# Patient Record
Sex: MALE | Race: WHITE | NOT HISPANIC OR LATINO | URBAN - METROPOLITAN AREA
[De-identification: names, ages, dates, MRNs, and addresses within clinical notes are randomized per-mention and may not be internally consistent; named-entity substitution may affect disease eponyms.]

---

## 2017-05-24 ENCOUNTER — EMERGENCY (EMERGENCY)
Facility: HOSPITAL | Age: 77
LOS: 1 days | Discharge: HOME | End: 2017-05-24
Attending: EMERGENCY MEDICINE | Admitting: EMERGENCY MEDICINE
Payer: MEDICARE

## 2017-05-24 VITALS
TEMPERATURE: 98 F | HEIGHT: 70 IN | SYSTOLIC BLOOD PRESSURE: 176 MMHG | HEART RATE: 90 BPM | RESPIRATION RATE: 16 BRPM | OXYGEN SATURATION: 97 % | WEIGHT: 149.91 LBS | DIASTOLIC BLOOD PRESSURE: 80 MMHG

## 2017-05-24 VITALS
HEART RATE: 86 BPM | TEMPERATURE: 98 F | RESPIRATION RATE: 15 BRPM | DIASTOLIC BLOOD PRESSURE: 88 MMHG | OXYGEN SATURATION: 98 % | SYSTOLIC BLOOD PRESSURE: 155 MMHG

## 2017-05-24 DIAGNOSIS — Z86.73 PERSONAL HISTORY OF TRANSIENT ISCHEMIC ATTACK (TIA), AND CEREBRAL INFARCTION WITHOUT RESIDUAL DEFICITS: ICD-10-CM

## 2017-05-24 DIAGNOSIS — E11.9 TYPE 2 DIABETES MELLITUS WITHOUT COMPLICATIONS: ICD-10-CM

## 2017-05-24 DIAGNOSIS — Z79.84 LONG TERM (CURRENT) USE OF ORAL HYPOGLYCEMIC DRUGS: ICD-10-CM

## 2017-05-24 DIAGNOSIS — R10.9 UNSPECIFIED ABDOMINAL PAIN: ICD-10-CM

## 2017-05-24 DIAGNOSIS — N20.0 CALCULUS OF KIDNEY: ICD-10-CM

## 2017-05-24 LAB
ALBUMIN SERPL ELPH-MCNC: 4.4 G/DL — SIGNIFICANT CHANGE UP (ref 3.3–5)
ALP SERPL-CCNC: 66 U/L — SIGNIFICANT CHANGE UP (ref 40–120)
ALT FLD-CCNC: 24 U/L — SIGNIFICANT CHANGE UP (ref 12–78)
ANION GAP SERPL CALC-SCNC: 9 MMOL/L — SIGNIFICANT CHANGE UP (ref 5–17)
APPEARANCE UR: CLEAR — SIGNIFICANT CHANGE UP
AST SERPL-CCNC: 24 U/L — SIGNIFICANT CHANGE UP (ref 15–37)
BASOPHILS # BLD AUTO: 0 K/UL — SIGNIFICANT CHANGE UP (ref 0–0.2)
BASOPHILS NFR BLD AUTO: 0.5 % — SIGNIFICANT CHANGE UP (ref 0–2)
BILIRUB SERPL-MCNC: 0.8 MG/DL — SIGNIFICANT CHANGE UP (ref 0.2–1.2)
BILIRUB UR-MCNC: NEGATIVE — SIGNIFICANT CHANGE UP
BUN SERPL-MCNC: 22 MG/DL — SIGNIFICANT CHANGE UP (ref 7–23)
CALCIUM SERPL-MCNC: 9.5 MG/DL — SIGNIFICANT CHANGE UP (ref 8.5–10.1)
CHLORIDE SERPL-SCNC: 103 MMOL/L — SIGNIFICANT CHANGE UP (ref 96–108)
CO2 SERPL-SCNC: 28 MMOL/L — SIGNIFICANT CHANGE UP (ref 22–31)
COLOR SPEC: YELLOW — SIGNIFICANT CHANGE UP
CREAT SERPL-MCNC: 1.6 MG/DL — HIGH (ref 0.5–1.3)
DIFF PNL FLD: ABNORMAL
EOSINOPHIL # BLD AUTO: 0 K/UL — SIGNIFICANT CHANGE UP (ref 0–0.5)
EOSINOPHIL NFR BLD AUTO: 0.1 % — SIGNIFICANT CHANGE UP (ref 0–6)
GLUCOSE SERPL-MCNC: 285 MG/DL — HIGH (ref 70–99)
GLUCOSE UR QL: 300 MG/DL
HCT VFR BLD CALC: 45.1 % — SIGNIFICANT CHANGE UP (ref 39–50)
HGB BLD-MCNC: 15.7 G/DL — SIGNIFICANT CHANGE UP (ref 13–17)
KETONES UR-MCNC: ABNORMAL
LEUKOCYTE ESTERASE UR-ACNC: NEGATIVE — SIGNIFICANT CHANGE UP
LYMPHOCYTES # BLD AUTO: 0.4 K/UL — LOW (ref 1–3.3)
LYMPHOCYTES # BLD AUTO: 5.4 % — LOW (ref 13–44)
MCHC RBC-ENTMCNC: 33.4 PG — SIGNIFICANT CHANGE UP (ref 27–34)
MCHC RBC-ENTMCNC: 34.9 GM/DL — SIGNIFICANT CHANGE UP (ref 32–36)
MCV RBC AUTO: 95.6 FL — SIGNIFICANT CHANGE UP (ref 80–100)
MONOCYTES # BLD AUTO: 0.5 K/UL — SIGNIFICANT CHANGE UP (ref 0–0.9)
MONOCYTES NFR BLD AUTO: 6.5 % — SIGNIFICANT CHANGE UP (ref 1–9)
NEUTROPHILS # BLD AUTO: 7.3 K/UL — SIGNIFICANT CHANGE UP (ref 1.8–7.4)
NEUTROPHILS NFR BLD AUTO: 87.5 % — HIGH (ref 43–77)
NITRITE UR-MCNC: NEGATIVE — SIGNIFICANT CHANGE UP
PH UR: 6 — SIGNIFICANT CHANGE UP (ref 5–8)
PLATELET # BLD AUTO: 210 K/UL — SIGNIFICANT CHANGE UP (ref 150–400)
POTASSIUM SERPL-MCNC: 5.5 MMOL/L — HIGH (ref 3.5–5.3)
POTASSIUM SERPL-SCNC: 5.5 MMOL/L — HIGH (ref 3.5–5.3)
PROT SERPL-MCNC: 7.8 G/DL — SIGNIFICANT CHANGE UP (ref 6–8.3)
PROT UR-MCNC: 75 MG/DL
RBC # BLD: 4.71 M/UL — SIGNIFICANT CHANGE UP (ref 4.2–5.8)
RBC # FLD: 11.5 % — SIGNIFICANT CHANGE UP (ref 10.3–14.5)
SODIUM SERPL-SCNC: 140 MMOL/L — SIGNIFICANT CHANGE UP (ref 135–145)
SP GR SPEC: 1.01 — SIGNIFICANT CHANGE UP (ref 1.01–1.02)
UROBILINOGEN FLD QL: NEGATIVE — SIGNIFICANT CHANGE UP
WBC # BLD: 8.3 K/UL — SIGNIFICANT CHANGE UP (ref 3.8–10.5)
WBC # FLD AUTO: 8.3 K/UL — SIGNIFICANT CHANGE UP (ref 3.8–10.5)

## 2017-05-24 PROCEDURE — 81001 URINALYSIS AUTO W/SCOPE: CPT

## 2017-05-24 PROCEDURE — 85027 COMPLETE CBC AUTOMATED: CPT

## 2017-05-24 PROCEDURE — 87086 URINE CULTURE/COLONY COUNT: CPT

## 2017-05-24 PROCEDURE — 74176 CT ABD & PELVIS W/O CONTRAST: CPT

## 2017-05-24 PROCEDURE — 99284 EMERGENCY DEPT VISIT MOD MDM: CPT | Mod: 25

## 2017-05-24 PROCEDURE — 99284 EMERGENCY DEPT VISIT MOD MDM: CPT

## 2017-05-24 PROCEDURE — 80053 COMPREHEN METABOLIC PANEL: CPT

## 2017-05-24 PROCEDURE — 74176 CT ABD & PELVIS W/O CONTRAST: CPT | Mod: 26

## 2017-05-24 RX ORDER — CEFOXITIN 1 G/1
1 INJECTION, POWDER, FOR SOLUTION INTRAVENOUS
Qty: 20 | Refills: 0 | OUTPATIENT
Start: 2017-05-24 | End: 2017-06-03

## 2017-05-24 RX ORDER — TAMSULOSIN HYDROCHLORIDE 0.4 MG/1
0.4 CAPSULE ORAL ONCE
Qty: 0 | Refills: 0 | Status: DISCONTINUED | OUTPATIENT
Start: 2017-05-24 | End: 2017-05-28

## 2017-05-24 RX ORDER — SODIUM CHLORIDE 9 MG/ML
1000 INJECTION INTRAMUSCULAR; INTRAVENOUS; SUBCUTANEOUS ONCE
Qty: 0 | Refills: 0 | Status: COMPLETED | OUTPATIENT
Start: 2017-05-24 | End: 2017-05-24

## 2017-05-24 RX ORDER — CEFUROXIME AXETIL 250 MG
500 TABLET ORAL ONCE
Qty: 0 | Refills: 0 | Status: DISCONTINUED | OUTPATIENT
Start: 2017-05-24 | End: 2017-05-28

## 2017-05-24 RX ORDER — TAMSULOSIN HYDROCHLORIDE 0.4 MG/1
1 CAPSULE ORAL
Qty: 7 | Refills: 0 | OUTPATIENT
Start: 2017-05-24 | End: 2017-05-31

## 2017-05-24 RX ADMIN — SODIUM CHLORIDE 1000 MILLILITER(S): 9 INJECTION INTRAMUSCULAR; INTRAVENOUS; SUBCUTANEOUS at 13:13

## 2017-05-24 NOTE — ED PROVIDER NOTE - MEDICAL DECISION MAKING DETAILS
left flank pain, ct , labs, ivf, found to have 3 mm stone, po abx, fllomax follow up with Dr Irving on Friday

## 2017-05-24 NOTE — ED ADULT NURSE NOTE - OBJECTIVE STATEMENT
LEft flank pain history of kidney stones states it "feels the same" afebrile will continue to monitor

## 2017-05-24 NOTE — ED PROVIDER NOTE - OBJECTIVE STATEMENT
left flank pain, began last night, states has hx of kidney stones, urologist Dr Irving  denies fever

## 2017-05-24 NOTE — ED PROVIDER NOTE - PROGRESS NOTE DETAILS
spoke with Dr Irving, case discussed, results discussed, advised ceftin, flomax , pain medication, increase fluids, follow up in office on Friday.  results discussed with patient 3 mm stone, rx for ceftin, flomax sent to pharmacy, states he has pain medication at home,

## 2017-05-24 NOTE — ED PROVIDER NOTE - ATTENDING CONTRIBUTION TO CARE
Left flank pain here for evaluation. Exam revealed older white male in mild distress with moderate left CVAT. I agree with plan and management outlined by PA

## 2017-05-25 LAB
CULTURE RESULTS: NO GROWTH — SIGNIFICANT CHANGE UP
SPECIMEN SOURCE: SIGNIFICANT CHANGE UP

## 2018-12-24 ENCOUNTER — EMERGENCY (EMERGENCY)
Facility: HOSPITAL | Age: 78
LOS: 1 days | Discharge: HOME | End: 2018-12-24
Attending: EMERGENCY MEDICINE
Payer: MEDICARE

## 2018-12-24 ENCOUNTER — EMERGENCY (EMERGENCY)
Facility: HOSPITAL | Age: 78
LOS: 1 days | Discharge: HOME | End: 2018-12-24
Attending: EMERGENCY MEDICINE | Admitting: EMERGENCY MEDICINE
Payer: MEDICARE

## 2018-12-24 VITALS — SYSTOLIC BLOOD PRESSURE: 190 MMHG | DIASTOLIC BLOOD PRESSURE: 91 MMHG | HEART RATE: 75 BPM

## 2018-12-24 VITALS
OXYGEN SATURATION: 99 % | HEART RATE: 85 BPM | RESPIRATION RATE: 16 BRPM | SYSTOLIC BLOOD PRESSURE: 140 MMHG | DIASTOLIC BLOOD PRESSURE: 90 MMHG | TEMPERATURE: 98 F

## 2018-12-24 VITALS
OXYGEN SATURATION: 100 % | WEIGHT: 153 LBS | DIASTOLIC BLOOD PRESSURE: 100 MMHG | HEART RATE: 77 BPM | TEMPERATURE: 98 F | HEIGHT: 70 IN | RESPIRATION RATE: 14 BRPM | SYSTOLIC BLOOD PRESSURE: 190 MMHG

## 2018-12-24 VITALS
RESPIRATION RATE: 20 BRPM | SYSTOLIC BLOOD PRESSURE: 140 MMHG | OXYGEN SATURATION: 100 % | HEART RATE: 74 BPM | TEMPERATURE: 98 F | DIASTOLIC BLOOD PRESSURE: 90 MMHG

## 2018-12-24 PROBLEM — I63.9 CEREBRAL INFARCTION, UNSPECIFIED: Chronic | Status: ACTIVE | Noted: 2017-05-24

## 2018-12-24 LAB
ALBUMIN SERPL ELPH-MCNC: 4.4 G/DL — SIGNIFICANT CHANGE UP (ref 3.3–5)
ALP SERPL-CCNC: 70 U/L — SIGNIFICANT CHANGE UP (ref 40–120)
ALT FLD-CCNC: 34 U/L — SIGNIFICANT CHANGE UP (ref 12–78)
ANION GAP SERPL CALC-SCNC: 6 MMOL/L — SIGNIFICANT CHANGE UP (ref 5–17)
APTT BLD: 30.7 SEC — SIGNIFICANT CHANGE UP (ref 28.5–37)
AST SERPL-CCNC: 30 U/L — SIGNIFICANT CHANGE UP (ref 15–37)
BASOPHILS # BLD AUTO: 0.01 K/UL — SIGNIFICANT CHANGE UP (ref 0–0.2)
BASOPHILS NFR BLD AUTO: 0.2 % — SIGNIFICANT CHANGE UP (ref 0–2)
BILIRUB SERPL-MCNC: 0.5 MG/DL — SIGNIFICANT CHANGE UP (ref 0.2–1.2)
BUN SERPL-MCNC: 24 MG/DL — HIGH (ref 7–23)
CALCIUM SERPL-MCNC: 9.6 MG/DL — SIGNIFICANT CHANGE UP (ref 8.5–10.1)
CHLORIDE SERPL-SCNC: 105 MMOL/L — SIGNIFICANT CHANGE UP (ref 96–108)
CO2 SERPL-SCNC: 29 MMOL/L — SIGNIFICANT CHANGE UP (ref 22–31)
CREAT SERPL-MCNC: 1.2 MG/DL — SIGNIFICANT CHANGE UP (ref 0.5–1.3)
EOSINOPHIL # BLD AUTO: 0.12 K/UL — SIGNIFICANT CHANGE UP (ref 0–0.5)
EOSINOPHIL NFR BLD AUTO: 2.5 % — SIGNIFICANT CHANGE UP (ref 0–6)
GLUCOSE SERPL-MCNC: 198 MG/DL — HIGH (ref 70–99)
HCT VFR BLD CALC: 38.8 % — LOW (ref 39–50)
HGB BLD-MCNC: 13.7 G/DL — SIGNIFICANT CHANGE UP (ref 13–17)
IMM GRANULOCYTES NFR BLD AUTO: 0.2 % — SIGNIFICANT CHANGE UP (ref 0–1.5)
INR BLD: 1.07 RATIO — SIGNIFICANT CHANGE UP (ref 0.88–1.16)
LYMPHOCYTES # BLD AUTO: 1.05 K/UL — SIGNIFICANT CHANGE UP (ref 1–3.3)
LYMPHOCYTES # BLD AUTO: 21.5 % — SIGNIFICANT CHANGE UP (ref 13–44)
MCHC RBC-ENTMCNC: 32.1 PG — SIGNIFICANT CHANGE UP (ref 27–34)
MCHC RBC-ENTMCNC: 35.3 GM/DL — SIGNIFICANT CHANGE UP (ref 32–36)
MCV RBC AUTO: 90.9 FL — SIGNIFICANT CHANGE UP (ref 80–100)
MONOCYTES # BLD AUTO: 0.39 K/UL — SIGNIFICANT CHANGE UP (ref 0–0.9)
MONOCYTES NFR BLD AUTO: 8 % — SIGNIFICANT CHANGE UP (ref 2–14)
NEUTROPHILS # BLD AUTO: 3.31 K/UL — SIGNIFICANT CHANGE UP (ref 1.8–7.4)
NEUTROPHILS NFR BLD AUTO: 67.6 % — SIGNIFICANT CHANGE UP (ref 43–77)
NRBC # BLD: 0 /100 WBCS — SIGNIFICANT CHANGE UP (ref 0–0)
PLATELET # BLD AUTO: 230 K/UL — SIGNIFICANT CHANGE UP (ref 150–400)
POTASSIUM SERPL-MCNC: 5.5 MMOL/L — HIGH (ref 3.5–5.3)
POTASSIUM SERPL-SCNC: 5.5 MMOL/L — HIGH (ref 3.5–5.3)
PROT SERPL-MCNC: 7.8 G/DL — SIGNIFICANT CHANGE UP (ref 6–8.3)
PROTHROM AB SERPL-ACNC: 12.2 SEC — SIGNIFICANT CHANGE UP (ref 10–12.9)
RBC # BLD: 4.27 M/UL — SIGNIFICANT CHANGE UP (ref 4.2–5.8)
RBC # FLD: 12.6 % — SIGNIFICANT CHANGE UP (ref 10.3–14.5)
SODIUM SERPL-SCNC: 140 MMOL/L — SIGNIFICANT CHANGE UP (ref 135–145)
WBC # BLD: 4.89 K/UL — SIGNIFICANT CHANGE UP (ref 3.8–10.5)
WBC # FLD AUTO: 4.89 K/UL — SIGNIFICANT CHANGE UP (ref 3.8–10.5)

## 2018-12-24 PROCEDURE — 99285 EMERGENCY DEPT VISIT HI MDM: CPT | Mod: 25

## 2018-12-24 PROCEDURE — 76512 OPH US DX B-SCAN: CPT

## 2018-12-24 PROCEDURE — 99291 CRITICAL CARE FIRST HOUR: CPT

## 2018-12-24 PROCEDURE — 99285 EMERGENCY DEPT VISIT HI MDM: CPT

## 2018-12-24 PROCEDURE — 36415 COLL VENOUS BLD VENIPUNCTURE: CPT

## 2018-12-24 PROCEDURE — 70450 CT HEAD/BRAIN W/O DYE: CPT | Mod: 26

## 2018-12-24 PROCEDURE — 70450 CT HEAD/BRAIN W/O DYE: CPT

## 2018-12-24 PROCEDURE — 80053 COMPREHEN METABOLIC PANEL: CPT

## 2018-12-24 PROCEDURE — 85730 THROMBOPLASTIN TIME PARTIAL: CPT

## 2018-12-24 PROCEDURE — 85027 COMPLETE CBC AUTOMATED: CPT

## 2018-12-24 PROCEDURE — 85610 PROTHROMBIN TIME: CPT

## 2018-12-24 RX ORDER — AMLODIPINE BESYLATE 2.5 MG/1
5 TABLET ORAL ONCE
Qty: 0 | Refills: 0 | Status: COMPLETED | OUTPATIENT
Start: 2018-12-24 | End: 2018-12-24

## 2018-12-24 RX ORDER — AMLODIPINE BESYLATE 2.5 MG/1
1 TABLET ORAL
Qty: 14 | Refills: 0
Start: 2018-12-24 | End: 2019-01-06

## 2018-12-24 RX ORDER — ASPIRIN/CALCIUM CARB/MAGNESIUM 324 MG
1 TABLET ORAL
Qty: 0 | Refills: 0 | COMMUNITY

## 2018-12-24 RX ADMIN — AMLODIPINE BESYLATE 5 MILLIGRAM(S): 2.5 TABLET ORAL at 21:39

## 2018-12-24 NOTE — ED PROVIDER NOTE - PROGRESS NOTE DETAILS
ophthalmology consult that had already been called is now educated that ultrasound demonstrates likely macula on retinal detachment with vitreous hemorrhage and that patient will likely need surgery. Lauro Balbuena MD, FACEP: ophthalmology here and evaluating patient. Informed low salt diet and start amlodipine for HTN, asymptomatic. He will f/u with his PMD in this week.

## 2018-12-24 NOTE — ED PROVIDER NOTE - NEUROLOGICAL, MLM
Alert and oriented, no focal deficits, no motor or sensory deficits EXCEPT FOR THE LOSS OF VISION IN R EYE NIHSS=1 DYSPHAGIA =PASS

## 2018-12-24 NOTE — ED PROVIDER NOTE - CARE PLAN
Principal Discharge DX:	Visual disturbance of one eye Principal Discharge DX:	Visual disturbance of one eye  Secondary Diagnosis:	Vitreous hemorrhage, right

## 2018-12-24 NOTE — ED PROVIDER NOTE - OBJECTIVE STATEMENT
pt is a 77 yo male who has hx of retinal surgery on r eye 4 years ago by dr gan pmd dr monik headley hx of stroke with left leg numbness now gone elev chol sp hernia and surgical pt is a 77 yo male who has hx of retinal surgery on r eye 4 years ago by dr gan pmd dr monik headley hx of stroke with left leg numbness now gone elev chol sp hernia and surgical repair of Dupuytren's contractures of both hands, a former smoker social drinker no drugs no allergies  he was in [facility] until he awoke at 7 am today with sudden loss of vision in r eye. he can perceive light but cant not see anything.  no other complaints no ha no nausea vomiting no weakness numbness

## 2018-12-24 NOTE — ED PROVIDER NOTE - NSFOLLOWUPCLINICS_GEN_ALL_ED_FT
[facility] Ophthalmology  Ophthalmology  [address] [place] [address] [address]  [place] [address]  Phone: [phone]  Fax:   Follow Up Time:

## 2018-12-24 NOTE — ED PROVIDER NOTE - EYES, MLM
Clear bilaterally, pupils equal, round and reactive to light. eomi, there is marked decreased vision of r eye on funduscopic eval the retina is unable to be seen no vasculature no red  reflex present, the left eye is sp cataracts retina able to be seen

## 2018-12-24 NOTE — ED PROVIDER NOTE - ATTENDING CONTRIBUTION TO CARE
Patient with right eye visual change upon awakening this am but has had increased pressure and some pain with only the ability to visualize light and colors today. Patient with right eye visual change upon awakening this am but has had increased pressure and some pain with only the ability to visualize light and colors today. Moderate. Worsening. Patient can only see light and color to right eye. patient denies any trauma.  GEN: NAD, awake, eyes open spontaneously  HEENT: NCAT, MMM, Trachea midline, normal conjunctiva, perrl  od: patient can not see fingers at 3-4 feet he can see color and light  os: 20/  ou: 20/  right eye fundoscopic exam darkened vitreous and unable to visualize vessels  left eye vessels visualized and grossly within normal limits  CHEST/LUNGS: Non-tachypneic, CTAB, bilateral breath sounds  CARDIAC: Non-tachycardic, normal perfusion  ABDOMEN: Soft, NTND, No rebound/guarding  MSK: No edema, no gross deformity of extremities  SKIN: No rashes, no petechiae, no vesicles  NEURO: CN grossly intact, normal coordination, no focal motor or sensory deficits  PSYCH: Alert, appropriate, cooperative, with capacity and insight   patient with elevated blood pressure and right visual eye loss to the point of seeing colors and lights  concern for right retinal detachment or hemorrhage  reviewed labs and imaging from outside hospital at [facility]  concerned for right eye retinal detachment with hemorrhage as see on ultrasound  will have ophthalmology consulted for emergent/urgent surgery Patient with right eye visual change upon awakening this am but has had increased pressure and some pain with only the ability to visualize light and colors today. Moderate. Worsening. Patient can only see light and color to right eye. patient denies any trauma.  GEN: NAD, awake, eyes open spontaneously  HEENT: NCAT, MMM, Trachea midline, normal conjunctiva, perrl  od: patient can not see fingers at 3-4 feet he can see color and light  os: 20/  ou: 20/  right eye fundoscopic exam darkened vitreous and unable to visualize vessels  left eye vessels visualized and grossly within normal limits  CHEST/LUNGS: Non-tachypneic, CTAB, bilateral breath sounds  CARDIAC: Non-tachycardic, normal perfusion  ABDOMEN: Soft, NTND, No rebound/guarding  MSK: No edema, no gross deformity of extremities  SKIN: No rashes, no petechiae, no vesicles  NEURO: CN grossly intact, normal coordination, no focal motor or sensory deficits  PSYCH: Alert, appropriate, cooperative, with capacity and insight   patient with elevated blood pressure and right visual eye loss to the point of seeing colors and lights  concern for right retinal detachment or hemorrhage  reviewed labs and imaging from outside hospital at [facility]  concerned for right eye retinal detachment with hemorrhage, ultrasound consistent with hemorrhage without direct evidence of retinal detachment  will have ophthalmology consulted for emergent/urgent surgery and evaluation   patient and family educated on findings, not likely detachment as per ophthalmology and upon review of imaging with patient/family/and resident  patient to  follow up in 2 days with ophthalmology for retinal reassessment, patient educated to keep the head of the bed elevated when sleeping and will  follow up this week  No immediate life threatening issues present on history or clinical exam. Patient is a safe disposition home, has capacity and insight into their condition, is ambulatory in the Emergency Department with no further questions and will follow up with their doctor(s) this week. Patient and family  understand anticipatory guidance were given strict return and follow up precautions.  The patient and family have been informed of all concerning signs and symptoms to return to Emergency Department, the necessity to follow up with the PMD/Clinic/follow up provided within 2-3 days was explained, and the patient and/or family reports understanding of above with capacity and insight. Patient with right eye visual change upon awakening this am but has had increased pressure and some pain with only the ability to visualize light and colors today. Moderate. Worsening. Patient can only see light and color to right eye. patient denies any trauma.  GEN: NAD, awake, eyes open spontaneously  HEENT: NCAT, MMM, Trachea midline, normal conjunctiva, perrl  od: patient can not see fingers at 3-4 feet he can see light  os: 20/25  right eye fundoscopic exam darkened vitreous and unable to visualize vessels  left eye vessels visualized and grossly within normal limits  CHEST/LUNGS: Non-tachypneic, CTAB, bilateral breath sounds  CARDIAC: Non-tachycardic, normal perfusion  ABDOMEN: Soft, NTND, No rebound/guarding  MSK: No edema, no gross deformity of extremities  SKIN: No rashes, no petechiae, no vesicles  NEURO: CN grossly intact, normal coordination, no focal motor or sensory deficits  PSYCH: Alert, appropriate, cooperative, with capacity and insight   patient with elevated blood pressure and right visual eye loss to the point of seeing colors and lights  concern for right retinal detachment or hemorrhage  reviewed labs and imaging from outside hospital at [facility]  concerned for right eye retinal detachment with hemorrhage, ultrasound consistent with hemorrhage without direct evidence of retinal detachment  will have ophthalmology consulted for emergent/urgent surgery and evaluation   patient and family educated on findings, not likely detachment as per ophthalmology and upon review of imaging with patient/family/and resident  patient to  follow up in 2 days with ophthalmology for retinal reassessment, patient educated to keep the head of the bed elevated when sleeping and will  follow up this week  No immediate life threatening issues present on history or clinical exam. Patient is a safe disposition home, has capacity and insight into their condition, is ambulatory in the Emergency Department with no further questions and will follow up with their doctor(s) this week. Patient and family  understand anticipatory guidance were given strict return and follow up precautions.  The patient and family have been informed of all concerning signs and symptoms to return to Emergency Department, the necessity to follow up with the PMD/Clinic/follow up provided within 2-3 days was explained, and the patient and/or family reports understanding of above with capacity and insight.

## 2018-12-24 NOTE — ED ADULT NURSE NOTE - OBJECTIVE STATEMENT
78 y.o male pmh DM, HLD, TIA in the passed, r. sided retinal surgery approx 4 years ago presenting to ED by EMS as transfer from [facility] c/o R. eye vision loss and sent to have optho consult. pt states he is able to visualize and make out shapes and lights but has not other vision side from that. denies any eye or head injury. states that the vision loss started at about 7 am this morning and has been gradually getting worse. denies any eye pain, headache, dizziness , nausea, vomiting, or unsteady gait. no significant neuro deficits present. bp elevated upon assessment with manual BP OF 190s-200s systolic. pt denies ever having a hx of HTN. remaining VS stable, HR of 78. family remains at the bedside safety and fall precautions maintained. pending optho consult. call bell at the bedside and within reach.

## 2018-12-24 NOTE — CONSULT NOTE ADULT - SUBJECTIVE AND OBJECTIVE BOX
[facility] Ophthalmology Consult Note    HPI: 78-year-old male      PMH: DM (a1c 6.3)  Meds: Metformin, glyburide, statin, aspirin  POcHx (including surgeries/lasers/trauma): [place] for diabetes [place] eye at [facility] ago, CEIOL OD  Drops: None  FamHx: None  Social Hx: Occasional alcohol, no tobacco  Allergies: NKDA    ROS:  General (neg), Vision (per HPI), Head and Neck (neg), Pulm (neg), CV (neg), GI (neg),  (neg), Musculoskeletal (neg), Skin/Integ (neg), Neuro (neg), Endocrine (neg), Heme (neg), All/Immuno (neg)    Mood and Affect Appropriate ( x ),  Oriented to Time, Place, and Person x 3 ( x )    Ophthalmology Exam    Visual acuity (cc reading glasses near card): HM OD, 20/25 OS  Pupils: PERRL OU, no APD  Ttono: 17 OU  Extraocular movements (EOMs): Full OU, no pain, no diplopia  Confrontational Visual Field (CVF):  Full to HM OD, full OS  Color Plates: WENDY OD 2/2 poor VA; 11/12 OS    Slit Lamp Exam (SLE)  External:  Flat OU  Lids/Lashes/Lacrimal Ducts: Flat OU    Sclera/Conjunctiva:  W+Q OU  Cornea: 1+ SPK OU  Anterior Chamber: D+Q OU   Iris:  Flat OU  Lens:  PCIOL OD; 1+ NS OS    Fundus Exam: dilated with 1% tropicamide and 2.5% phenylephrine  Approval obtained from primary team for dilation  Patient aware that pupils can remained dilated for at least 4-6 hours  Exam performed with 90D and 20D lens    Vitreous: wnl OU  Disc, cup/disc: sharp and pink, 0.4 OU  Macula:  wnl OU  Vessels:  wnl OU  Periphery: wnl OU    Diagnostic Testing:    Assessment:      Plan:      Follow-Up:  Patient should follow up his/her ophthalmologist or in the [facility] Ophthalmology Practice within 1 week of discharge  [address] [place] [address] [phone] [facility] Ophthalmology Consult Note    HPI: 78-year-old male DM s/p "laser" OD transferred from [place] with acute painless vision loss x 1 day. This morning patient noticed he could not see well out of his right eye described like a black areain the front of his vision that has gotten a little bit worse throughout the day, not intermittent. States happened acutely with no associated flashes of light. Has not happened before. No associated pain, headache, jaw claudication, or scalp tenderness, numbness/weakness. No trauma.    PMH: DM (a1c 6.3), TIA, HLD  Meds: Metformin, glyburide, statin, aspirin  POcHx (including surgeries/lasers/trauma): Laser for diabetes right eye at least 4 years ago, CEIOL OD  Drops: None  FamHx: None  Social Hx: Occasional alcohol, no tobacco  Allergies: NKDA    ROS:  General (neg), Vision (per HPI), Head and Neck (neg), Pulm (neg), CV (neg), GI (neg),  (neg), Musculoskeletal (neg), Skin/Integ (neg), Neuro (neg), Endocrine (neg), Heme (neg), All/Immuno (neg)    Mood and Affect Appropriate ( x ),  Oriented to Time, Place, and Person x 3 ( x )    Ophthalmology Exam    Visual acuity (cc reading glasses near card): HM OD, 20/25 OS  Pupils: PERRL OU, no APD  Ttono: 17 OU  Extraocular movements (EOMs): Full OU, no pain, no diplopia  Confrontational Visual Field (CVF):  Full to HM OD, full OS  Color Plates: WENDY OD 2/2 poor VA; 11/12 OS    Slit Lamp Exam (SLE)  External:  Flat OU  Lids/Lashes/Lacrimal Ducts: Flat OU    Sclera/Conjunctiva:  W+Q OU  Cornea: 1+ SPK OU  Anterior Chamber: D+Q OU   Iris:  Flat OU  Lens:  PCIOL OD; 1+ NS OS    Fundus Exam: dilated with 1% tropicamide and 2.5% phenylephrine  Approval obtained from primary team for dilation  Patient aware that pupils can remained dilated for at least 4-6 hours  Exam performed with 90D and 20D lens    Vitreous: wnl OU  Disc, cup/disc: sharp and pink, 0.4 OU  Macula:  wnl OU  Vessels:  wnl OU  Periphery: wnl OU    Diagnostic Testing:    Assessment:      Plan:      Follow-Up:  Patient should follow up his/her ophthalmologist or in the [facility] Ophthalmology Practice within 1 week of discharge  [address] [facility] [place] [address] [phone] [facility] Ophthalmology Consult Note    HPI: 78-year-old male DM s/p "laser" OD transferred from [place] with acute painless vision loss x 1 day. This morning patient noticed he could not see well out of his right eye described like a black area in the front of his vision that has gotten a little bit worse throughout the day, not intermittent. Can only see light/dark and motion. States happened acutely with no associated flashes of light or sudden onset floaters. Has not happened before. No associated pain, headache, jaw claudication, or scalp tenderness, numbness/weakness. No trauma.    PMH: DM (a1c 6.3), TIA, HLD  Meds: Metformin, glyburide, statin, aspirin  POcHx (including surgeries/lasers/trauma): Laser for diabetes right eye at least 4 years ago, CEIOL OD  Drops: None  FamHx: None  Social Hx: Occasional alcohol, no tobacco  Allergies: NKDA    ROS:  General (neg), Vision (per HPI), Head and Neck (neg), Pulm (neg), CV (neg), GI (neg),  (neg), Musculoskeletal (neg), Skin/Integ (neg), Neuro (neg), Endocrine (neg), Heme (neg), All/Immuno (neg)    Mood and Affect Appropriate ( x ),  Oriented to Time, Place, and Person x 3 ( x )    Ophthalmology Exam    Visual acuity (cc reading glasses near card): HM OD, 20/25 OS  Pupils: PERRL OU, no APD  Ttono: 17 OU  Extraocular movements (EOMs): Full OU, no pain, no diplopia  Confrontational Visual Field (CVF):  Full to HM OD, full OS  Color Plates: WENDY OD 2/2 poor VA; 11/12 OS    Slit Lamp Exam (SLE)  External:  Flat OU  Lids/Lashes/Lacrimal Ducts: Flat OU    Sclera/Conjunctiva:  W+Q OU  Cornea: trace SPK OU  Anterior Chamber: D+Q OU   Iris:  Flat OU  Lens:  PCIOL OD; 1+ NS OS    Fundus Exam: dilated with 1% tropicamide and 2.5% phenylephrine  Approval obtained from primary team for dilation  Patient aware that pupils can remained dilated for at least 4-6 hours  Exam performed with 90D and 20D lens    Vitreous: vitreous hemorrhage OD; clear OS  Disc, cup/disc: unable to visualize OD 2/2 vit heme; sharp and pink 0.4 OS  Macula:  unable to visualize OD 2/2 vit heme; scattered MA and IRH OS  Vessels:   unable to visualize OD 2/2 vit heme; normal OS  Periphery: unable to visualize OD 2/2 vit heme; scattered IRH OS    Diagnostic Testing: B-scan OD showed vitreous hemorrhage with no apparent detachment, tear, or break.    Assessment: 78-year-old male DM (A1c 6.3, s/p laser 4 years prior) transferred from [place] with acute painless vision loss x 1 day. No complaints OS. OD VA HM (PHNI), IOP 17, no APD. Anterior exam WNL. DFE OD with vitreous hemorrhage obscuring view to posterior pole. B-scan ultrasound OD showed vitreous hemorrhage without obvious retinal detachment. Images were reviewed with senior resident Dr. Gamez. Symptoms and exam consistent with vitreous hemorrhage 2/2 diabetic retinopathy especially with moderate diabetic retinopathy seen in the fellow eye.    Plan:  - no heavy lifting, no straining, no bending  - keep head of bed elevated  - avoid NSAIDs unless medically necessary  - follow-up with Dr. Storey at retina clinic [facility] [place] [address], [phone]). Message left for Dr. Storey.    Follow-Up:  Patient should follow up with his ohthalmologist or in the [facility] Ophthalmology Practice on Wednesday 12/26/2018  [address] [place] [address] [phone]    D/w chief resident Dr. Gamez.

## 2018-12-24 NOTE — ED PROVIDER NOTE - CRITICAL CARE PROVIDED
consultation with other physicians/consult w/ pt's family directly relating to pts condition/interpretation of diagnostic studies/documentation/additional history taking/direct patient care (not related to procedure)

## 2018-12-24 NOTE — ED PROVIDER NOTE - PHYSICAL EXAMINATION
NAD, VSS, Afebrile, + PERRL with full EOMs, Move all extremities with 5/5strength and intact sensory.

## 2018-12-24 NOTE — ED PROVIDER NOTE - PROGRESS NOTE DETAILS
arnold [phone] on call at [facility] syoss paged no name provided  to go to [facility] via transfer center. jay from transfer center- jay from transfer center-  dr jose villagomez attending

## 2018-12-24 NOTE — ED ADULT TRIAGE NOTE - CHIEF COMPLAINT QUOTE
"I can not see out of rt eye"  woke up unable to see out of rt eye  speech cl  moving upper and lower extremities  denies Headache/pain

## 2018-12-24 NOTE — ED ADULT NURSE NOTE - OBJECTIVE STATEMENT
Assumed patient care @ 1430. Pt received sitting on stretcher in no apparent distress. Pt AOx3 C/O not being able to see out of right eye since this am and sent by Urgent Care. Patient denies pain or headache. Patient with a h/o retina issues and laser of the vessels.  Lungs clear to ausculation, respirations even unlabored. Abd soft non tender, + bowel sounds x 4quadrants. Denies numbness, tingling, no facial droop, Nausea, Vomiting, Diarrhea. Skin warm, dry, color appropriate for age and race. Patient to be transferred to [facility] to rule out a retina detachment.

## 2018-12-26 ENCOUNTER — APPOINTMENT (OUTPATIENT)
Dept: OPHTHALMOLOGY | Facility: CLINIC | Age: 78
End: 2018-12-26
Payer: MEDICARE

## 2018-12-26 DIAGNOSIS — E11.3392 TYPE 2 DIABETES MELLITUS WITH MODERATE NONPROLIFERATIVE DIABETIC RETINOPATHY WITHOUT MACULAR EDEMA, LEFT EYE: ICD-10-CM

## 2018-12-26 DIAGNOSIS — E11.3591 TYPE 2 DIABETES MELLITUS WITH PROLIFERATIVE DIABETIC RETINOPATHY WITHOUT MACULAR EDEMA, RIGHT EYE: ICD-10-CM

## 2018-12-26 PROCEDURE — 92225: CPT | Mod: LT

## 2018-12-26 PROCEDURE — 92004 COMPRE OPH EXAM NEW PT 1/>: CPT

## 2018-12-26 PROCEDURE — 76512 OPH US DX B-SCAN: CPT | Mod: RT

## 2019-08-07 ENCOUNTER — EMERGENCY (EMERGENCY)
Facility: HOSPITAL | Age: 79
LOS: 1 days | Discharge: HOME | End: 2019-08-07
Attending: EMERGENCY MEDICINE | Admitting: EMERGENCY MEDICINE
Payer: MEDICARE

## 2019-08-07 VITALS
TEMPERATURE: 98 F | SYSTOLIC BLOOD PRESSURE: 144 MMHG | OXYGEN SATURATION: 99 % | WEIGHT: 154.98 LBS | DIASTOLIC BLOOD PRESSURE: 89 MMHG | HEART RATE: 83 BPM | RESPIRATION RATE: 16 BRPM

## 2019-08-07 PROCEDURE — 70450 CT HEAD/BRAIN W/O DYE: CPT | Mod: 26

## 2019-08-07 PROCEDURE — 99284 EMERGENCY DEPT VISIT MOD MDM: CPT

## 2019-08-07 NOTE — ED PROVIDER NOTE - OBJECTIVE STATEMENT
77 yo white male with H/O CVA (cerebral vascular accident)    Diabetes    Hyperlipidemia    Kidney stone, tripped and fell short while ago when emptying garbage, hit head, no LOC or neck pain now here for evaluation. No N/V/HA/Neck Pain/Hip Pains or Back Pains.

## 2019-08-07 NOTE — ED ADULT NURSE NOTE - NSIMPLEMENTINTERV_GEN_ALL_ED
Implemented All Fall with Harm Risk Interventions:  [place] to call system. Call bell, personal items and telephone within reach. Instruct patient to call for assistance. Room bathroom lighting operational. Non-slip footwear when patient is off stretcher. Physically safe environment: no spills, clutter or unnecessary equipment. Stretcher in lowest position, wheels locked, appropriate side rails in place. Provide visual cue, wrist band, yellow gown, etc. Monitor gait and stability. Monitor for mental status changes and reorient to person, place, and time. Review medications for side effects contributing to fall risk. Reinforce activity limits and safety measures with patient and family. Provide visual clues: red socks.

## 2019-08-07 NOTE — ED PROVIDER NOTE - ENMT, MLM
Airway patent. No C-Spine tenderness. Abrasion noted to right side of forehead and right cheek both non tender. Mandible non tender.

## 2019-08-07 NOTE — ED ADULT NURSE NOTE - OBJECTIVE STATEMENT
79 y/o M patient presents to ED from home s/p fall today. Patient reports he was taking the garbage out when he "lost balance" and fell onto concrete pavement face forward. Patient denies LOC. Patient A&Ox4. abrasion noted to right forehead and right cheek with bleeding. patient reports he was ambulatory s/p fall. Patient denies HA, dizziness, blurry vision, SOB, chest pain, abdominal pain, N/V/D. Safety and comfort measures provided and maintained. MD bedside.

## 2019-08-07 NOTE — ED PROVIDER NOTE - CHPI ED SYMPTOMS NEG
no change in level of consciousness/no confusion/no vomiting/no blurred vision/no seizure/no dizziness/no loss of consciousness/no syncope/no weakness/no nausea

## 2019-08-07 NOTE — ED ADULT NURSE NOTE - GENITOURINARY WDL
Bladder non-tender and non-distended. Urine clear yellow.
H/O Moh's micrographic surgery for skin cancer  4/30/18 right cheek  Inguinal hernia s/p repair (L)    Renal cyst surgery in 2008

## 2019-08-07 NOTE — ED PROVIDER NOTE - CLINICAL SUMMARY MEDICAL DECISION MAKING FREE TEXT BOX
"S:  Seen and examined.  POD #1 s/p R hip nail.  Doing well this morning.  No new complaints, pain controlled.    O: /44   Pulse 96   Temp 36.8 °C (98.2 °F) (Oral)   Resp 18   Ht 1.575 m (5' 2\")   Wt 99.8 kg (220 lb 0.3 oz)   SpO2 95% .    Intake/Output Summary (Last 24 hours) at 06/14/19 0904  Last data filed at 06/14/19 0800   Gross per 24 hour   Intake             3890 ml   Output             3900 ml   Net              -10 ml   .    Operative/injured extremity examined.  Compartments soft, distal light touch sensation intact, firing EHL/TA/GS/P.  Toes warm, well-perfused.  Wound dressing c/d/i    Recent Labs      06/12/19   1147  06/13/19   0503  06/13/19   1753   WBC  9.1  7.0  10.1   RBC  4.15*  3.84*  3.84*   HEMOGLOBIN  13.2  12.5  12.5   HEMATOCRIT  40.1  37.6  37.7   MCV  96.6  97.9*  98.2*   MCH  31.8  32.6  32.6   MCHC  32.9*  33.2*  33.2*   RDW  45.1  45.5  45.9   PLATELETCT  239  209  213   MPV  8.6*  8.8*  8.9*       A/P:    POD #1 s/p R hip nail    Antibiotics: Ancef x24 hrs  Activity: WBAT operative extremity.  PT today.  Diet: General  DVT: Mechanical (SCDs) + Pharmacologic (Coumadin)  Dispo: D/C planning    " Trip and fall short while ago, hit head now requiring evaluation and ct scan.

## 2019-08-08 VITALS
RESPIRATION RATE: 16 BRPM | DIASTOLIC BLOOD PRESSURE: 82 MMHG | OXYGEN SATURATION: 97 % | HEART RATE: 81 BPM | SYSTOLIC BLOOD PRESSURE: 137 MMHG | TEMPERATURE: 98 F

## 2019-08-08 PROCEDURE — 99284 EMERGENCY DEPT VISIT MOD MDM: CPT | Mod: 25

## 2019-08-08 PROCEDURE — 70450 CT HEAD/BRAIN W/O DYE: CPT

## 2019-08-08 RX ORDER — BACITRACIN ZINC 500 UNIT/G
1 OINTMENT IN PACKET (EA) TOPICAL ONCE
Refills: 0 | Status: COMPLETED | OUTPATIENT
Start: 2019-08-08 | End: 2019-08-08

## 2019-08-08 RX ADMIN — Medication 1 APPLICATION(S): at 00:26

## 2019-09-06 NOTE — ED PROVIDER NOTE - CARE PLAN
LARISSA Principal Discharge DX:	Head trauma, initial encounter  Secondary Diagnosis:	Facial abrasion, initial encounter

## 2019-10-24 NOTE — ED ADULT NURSE NOTE - CINV DISCH TEACH PARTICIP
Patient in with complaints of right foot pain since yesterday. Denies trauma. States went to clinic and was sent to ED for evaluation. Concerned infection because foot feels hot. States difficult to walk on foot because of the pain.  
Spouse/Patient

## 2020-03-04 NOTE — ED ADULT NURSE NOTE - BREATH SOUNDS, MLM
Detail Level: Simple
Additional Notes: Start TAC cream BID x 5-7 days\\n\\nStop application of Efudex on muscle area and continue to rest of face x 4 more days for a total of 14 days\\n\\nFor spot on nose use Efudex for another 2-3 extra days
Clear

## 2020-06-22 NOTE — ED PROVIDER NOTE - NSFOLLOWUPINSTRUCTIONS_ED_ALL_ED_FT
D/w pt and wife/kids. Will go to ER now for further evaluation. Head elevation.  No strain your eye.  Low salt diet.  Amlodipine 5mg daily for blood pressure and follow up with your primary dr. in this week.  Follow up with your eye dr. on Wednesday or eye clinic [phone].  Return for any concerns, headache, chest pain, SOB, or worsening symptoms.

## 2021-06-07 NOTE — ED ADULT NURSE NOTE - CHPI ED NUR SYMPTOMS NEG
L/M for patient informing him of medication change.   
Patient calling with questions/issues in regards to the following medication/s:   the new medication which patient was not sure of the name of it but that medication is going to be $47 for 30 pills and would like something cheaper if possible       If patient would require a prescription, please us the following Pharmacy:   [facility] [address] [place] [address] - [address] [facility]  [address] [facility] [address]  Phone: [phone] Fax: [phone]       Please call patient at the following number:  [phone]  Patient states that it is okay to leave a detailed message.    Patient was informed that the patient would receive a phone call back within 24-48 hours unless this request is STAT.  
Patient contacted us with concerns about the cost of the Jardiance, I instead prescribe glimepiride 1 mg q.a.m.  
Patient prescribed jardiance but states this is too expensive. Pharmacy loaded. PCP please advise alternative  
no vomiting/no confusion/no dizziness/no fever/no loss of consciousness/no weakness/no change in level of consciousness/no nausea/no numbness

## 2022-04-09 ENCOUNTER — TRANSCRIPTION ENCOUNTER (OUTPATIENT)
Age: 82
End: 2022-04-09

## 2022-11-29 NOTE — ED ADULT TRIAGE NOTE - AS O2 DELIVERY
room air Rhofade Pregnancy And Lactation Text: This medication has not been assigned a Pregnancy Risk Category. It is unknown if the medication is excreted in breast milk.

## 2022-12-19 NOTE — ED ADULT NURSE NOTE - CHPI ED NUR SYMPTOMS POS
NAME:   Jen Lopez  DATE: 22  MRN:     [id number]  :     2018      OPERATIVE NOTE      Pre Op Diagnosis:  Upper Airway Obstruction; Tonsil and Adenoid Hypertrophy; Chronic Otitis Media    Post Op Diagnosis:  same    Procedure:   Tonsillectomy and Adenoidectomy; Bilateral Myringotomy and tube placement    Surgeon:  Dereck    Anesthesia:  General    History:  This is a child with upper airway obstruction due to large tonsils and adenoids; and chronic otitis media with failure of medical management.    Procedure Description:    The patient was taken to the operating room and general anesthesia was induced without difficulty.      Attention was focused to the right ear using the microscope ear speculum and a curette the ear canal was cleaned of cerumen.  The tympanic membrane was visualized.  The myringotomy knife was used to make an anterior inferior quadrant incision.  Fluid was suctioned from the middle ear space and a tube was placed.  Attention was focused to the opposite ear and the same procedure and tube was placed.  Drops were applied to each ear.    Tubes placed: Ortiz    Findings:  Fluid bilateral ; Mucoid    A Mcgivor mouth gag was inserted.  The uvula and palate were inspected and found to be normal.  A red rubber catheter was inserted in the nose and out the oral cavity.  The adenoids were removed/ reduced with the coblation device.  Tissue was preserved at Passavant's ridge.  Hemostasis was achieved.  Attention was then focused to the hypertrophic tonsils.  Each tonsil was grasped with a tenaculum.  The coblation device was used to make a mucosal incision around the tonsil and the tonsil was dissected out and removed in it's entirety.  The opposite tonsil was removed in the same fashion.  Hemostasis was achieved with the coblation device without difficulty.  Stomach contents were suctioned.  The patient was awakened in the OR and taken to the recovery room in stable condition.  There were no  complications.  The EBL was less than 5 ml.    Adenoid Size:large    Tonsil Size: large    Additional Findings: right tonsil was granular and send fresh to pathology    Major Harden MD         change in vision right eye

## 2024-01-22 ENCOUNTER — INPATIENT (INPATIENT)
Facility: HOSPITAL | Age: 84
LOS: 3 days | Discharge: HOME | DRG: 683 | End: 2024-01-26
Attending: INTERNAL MEDICINE | Admitting: INTERNAL MEDICINE
Payer: MEDICARE

## 2024-01-22 VITALS
HEIGHT: 70 IN | TEMPERATURE: 98 F | SYSTOLIC BLOOD PRESSURE: 104 MMHG | RESPIRATION RATE: 18 BRPM | HEART RATE: 89 BPM | WEIGHT: 149.91 LBS | OXYGEN SATURATION: 99 % | DIASTOLIC BLOOD PRESSURE: 71 MMHG

## 2024-01-22 DIAGNOSIS — N17.9 ACUTE KIDNEY FAILURE, UNSPECIFIED: ICD-10-CM

## 2024-01-22 DIAGNOSIS — R62.7 ADULT FAILURE TO THRIVE: ICD-10-CM

## 2024-01-22 DIAGNOSIS — Z29.9 ENCOUNTER FOR PROPHYLACTIC MEASURES, UNSPECIFIED: ICD-10-CM

## 2024-01-22 DIAGNOSIS — E87.1 HYPO-OSMOLALITY AND HYPONATREMIA: ICD-10-CM

## 2024-01-22 DIAGNOSIS — E11.9 TYPE 2 DIABETES MELLITUS WITHOUT COMPLICATIONS: ICD-10-CM

## 2024-01-22 DIAGNOSIS — I10 ESSENTIAL (PRIMARY) HYPERTENSION: ICD-10-CM

## 2024-01-22 LAB
ALBUMIN SERPL ELPH-MCNC: 3.6 G/DL — SIGNIFICANT CHANGE UP (ref 3.3–5)
ALP SERPL-CCNC: 64 U/L — SIGNIFICANT CHANGE UP (ref 40–120)
ALT FLD-CCNC: 30 U/L — SIGNIFICANT CHANGE UP (ref 12–78)
ANION GAP SERPL CALC-SCNC: 9 MMOL/L — SIGNIFICANT CHANGE UP (ref 5–17)
AST SERPL-CCNC: 38 U/L — HIGH (ref 15–37)
BASOPHILS # BLD AUTO: 0.01 K/UL — SIGNIFICANT CHANGE UP (ref 0–0.2)
BASOPHILS NFR BLD AUTO: 0.3 % — SIGNIFICANT CHANGE UP (ref 0–2)
BILIRUB SERPL-MCNC: 1 MG/DL — SIGNIFICANT CHANGE UP (ref 0.2–1.2)
BUN SERPL-MCNC: 29 MG/DL — HIGH (ref 7–23)
CALCIUM SERPL-MCNC: 8.8 MG/DL — SIGNIFICANT CHANGE UP (ref 8.5–10.1)
CHLORIDE SERPL-SCNC: 100 MMOL/L — SIGNIFICANT CHANGE UP (ref 96–108)
CO2 SERPL-SCNC: 24 MMOL/L — SIGNIFICANT CHANGE UP (ref 22–31)
CREAT SERPL-MCNC: 1.9 MG/DL — HIGH (ref 0.5–1.3)
EGFR: 35 ML/MIN/1.73M2 — LOW
EOSINOPHIL # BLD AUTO: 0.04 K/UL — SIGNIFICANT CHANGE UP (ref 0–0.5)
EOSINOPHIL NFR BLD AUTO: 1.2 % — SIGNIFICANT CHANGE UP (ref 0–6)
GLUCOSE SERPL-MCNC: 274 MG/DL — HIGH (ref 70–99)
HCT VFR BLD CALC: 35.2 % — LOW (ref 39–50)
HGB BLD-MCNC: 12.5 G/DL — LOW (ref 13–17)
IMM GRANULOCYTES NFR BLD AUTO: 0.3 % — SIGNIFICANT CHANGE UP (ref 0–0.9)
LIDOCAIN IGE QN: 29 U/L — SIGNIFICANT CHANGE UP (ref 13–75)
LYMPHOCYTES # BLD AUTO: 0.47 K/UL — LOW (ref 1–3.3)
LYMPHOCYTES # BLD AUTO: 14.6 % — SIGNIFICANT CHANGE UP (ref 13–44)
MAGNESIUM SERPL-MCNC: 1.9 MG/DL — SIGNIFICANT CHANGE UP (ref 1.6–2.6)
MCHC RBC-ENTMCNC: 32.3 PG — SIGNIFICANT CHANGE UP (ref 27–34)
MCHC RBC-ENTMCNC: 35.5 GM/DL — SIGNIFICANT CHANGE UP (ref 32–36)
MCV RBC AUTO: 91 FL — SIGNIFICANT CHANGE UP (ref 80–100)
MONOCYTES # BLD AUTO: 0.65 K/UL — SIGNIFICANT CHANGE UP (ref 0–0.9)
MONOCYTES NFR BLD AUTO: 20.2 % — HIGH (ref 2–14)
NEUTROPHILS # BLD AUTO: 2.04 K/UL — SIGNIFICANT CHANGE UP (ref 1.8–7.4)
NEUTROPHILS NFR BLD AUTO: 63.4 % — SIGNIFICANT CHANGE UP (ref 43–77)
NRBC # BLD: 0 /100 WBCS — SIGNIFICANT CHANGE UP (ref 0–0)
PHOSPHATE SERPL-MCNC: 2.6 MG/DL — SIGNIFICANT CHANGE UP (ref 2.5–4.5)
PLATELET # BLD AUTO: 110 K/UL — LOW (ref 150–400)
POTASSIUM SERPL-MCNC: 3.7 MMOL/L — SIGNIFICANT CHANGE UP (ref 3.5–5.3)
POTASSIUM SERPL-SCNC: 3.7 MMOL/L — SIGNIFICANT CHANGE UP (ref 3.5–5.3)
PROT SERPL-MCNC: 6.9 G/DL — SIGNIFICANT CHANGE UP (ref 6–8.3)
RBC # BLD: 3.87 M/UL — LOW (ref 4.2–5.8)
RBC # FLD: 12.4 % — SIGNIFICANT CHANGE UP (ref 10.3–14.5)
SODIUM SERPL-SCNC: 133 MMOL/L — LOW (ref 135–145)
TROPONIN I, HIGH SENSITIVITY RESULT: 13 NG/L — SIGNIFICANT CHANGE UP
WBC # BLD: 3.22 K/UL — LOW (ref 3.8–10.5)
WBC # FLD AUTO: 3.22 K/UL — LOW (ref 3.8–10.5)

## 2024-01-22 PROCEDURE — 70450 CT HEAD/BRAIN W/O DYE: CPT | Mod: 26,MA

## 2024-01-22 PROCEDURE — 99285 EMERGENCY DEPT VISIT HI MDM: CPT | Mod: FS

## 2024-01-22 PROCEDURE — 71045 X-RAY EXAM CHEST 1 VIEW: CPT | Mod: 26

## 2024-01-22 RX ORDER — SODIUM CHLORIDE 9 MG/ML
1000 INJECTION, SOLUTION INTRAVENOUS
Refills: 0 | Status: DISCONTINUED | OUTPATIENT
Start: 2024-01-22 | End: 2024-01-26

## 2024-01-22 RX ORDER — GLUCAGON INJECTION, SOLUTION 0.5 MG/.1ML
1 INJECTION, SOLUTION SUBCUTANEOUS ONCE
Refills: 0 | Status: DISCONTINUED | OUTPATIENT
Start: 2024-01-22 | End: 2024-01-26

## 2024-01-22 RX ORDER — DEXTROSE 50 % IN WATER 50 %
25 SYRINGE (ML) INTRAVENOUS ONCE
Refills: 0 | Status: DISCONTINUED | OUTPATIENT
Start: 2024-01-22 | End: 2024-01-26

## 2024-01-22 RX ORDER — ACETAMINOPHEN 500 MG
650 TABLET ORAL EVERY 6 HOURS
Refills: 0 | Status: DISCONTINUED | OUTPATIENT
Start: 2024-01-22 | End: 2024-01-26

## 2024-01-22 RX ORDER — SODIUM CHLORIDE 9 MG/ML
1000 INJECTION INTRAMUSCULAR; INTRAVENOUS; SUBCUTANEOUS ONCE
Refills: 0 | Status: COMPLETED | OUTPATIENT
Start: 2024-01-22 | End: 2024-01-22

## 2024-01-22 RX ORDER — ATORVASTATIN CALCIUM 80 MG/1
20 TABLET, FILM COATED ORAL AT BEDTIME
Refills: 0 | Status: DISCONTINUED | OUTPATIENT
Start: 2024-01-22 | End: 2024-01-26

## 2024-01-22 RX ORDER — HEPARIN SODIUM 5000 [USP'U]/ML
5000 INJECTION INTRAVENOUS; SUBCUTANEOUS EVERY 8 HOURS
Refills: 0 | Status: DISCONTINUED | OUTPATIENT
Start: 2024-01-22 | End: 2024-01-26

## 2024-01-22 RX ORDER — INSULIN LISPRO 100/ML
VIAL (ML) SUBCUTANEOUS
Refills: 0 | Status: DISCONTINUED | OUTPATIENT
Start: 2024-01-22 | End: 2024-01-24

## 2024-01-22 RX ORDER — LISINOPRIL 2.5 MG/1
20 TABLET ORAL DAILY
Refills: 0 | Status: DISCONTINUED | OUTPATIENT
Start: 2024-01-22 | End: 2024-01-22

## 2024-01-22 RX ORDER — SODIUM CHLORIDE 9 MG/ML
1000 INJECTION, SOLUTION INTRAVENOUS
Refills: 0 | Status: DISCONTINUED | OUTPATIENT
Start: 2024-01-22 | End: 2024-01-23

## 2024-01-22 RX ORDER — DEXTROSE 50 % IN WATER 50 %
12.5 SYRINGE (ML) INTRAVENOUS ONCE
Refills: 0 | Status: DISCONTINUED | OUTPATIENT
Start: 2024-01-22 | End: 2024-01-26

## 2024-01-22 RX ORDER — AMLODIPINE BESYLATE 2.5 MG/1
5 TABLET ORAL DAILY
Refills: 0 | Status: DISCONTINUED | OUTPATIENT
Start: 2024-01-22 | End: 2024-01-26

## 2024-01-22 RX ORDER — INSULIN LISPRO 100/ML
VIAL (ML) SUBCUTANEOUS AT BEDTIME
Refills: 0 | Status: DISCONTINUED | OUTPATIENT
Start: 2024-01-22 | End: 2024-01-26

## 2024-01-22 RX ORDER — DEXTROSE 50 % IN WATER 50 %
15 SYRINGE (ML) INTRAVENOUS ONCE
Refills: 0 | Status: DISCONTINUED | OUTPATIENT
Start: 2024-01-22 | End: 2024-01-26

## 2024-01-22 RX ADMIN — SODIUM CHLORIDE 1000 MILLILITER(S): 9 INJECTION INTRAMUSCULAR; INTRAVENOUS; SUBCUTANEOUS at 13:49

## 2024-01-22 RX ADMIN — SODIUM CHLORIDE 1000 MILLILITER(S): 9 INJECTION INTRAMUSCULAR; INTRAVENOUS; SUBCUTANEOUS at 16:01

## 2024-01-22 NOTE — ED ADULT NURSE NOTE - NS TRANSFER PATIENT BELONGINGS
[facility]  Inpatient/Observation/Outpatient Rehabilitation    Date: 2023  Patient Name: Lisa Valladares       [] Inpatient Acute/Observation       [x]  Outpatient  : 1965     [x] Pt no showed for scheduled appointment  PTA contacted patient by phone, pt reported she is still sick and forgot to call to cancel. Reminded patient of next scheduled appointment on 23 at 1:30 PM.     Therapist/Assistant will attempt to see this patient, at our earliest opportunity.        Reena Galvez [id number] Date: 2023
Clothing

## 2024-01-22 NOTE — ED PROVIDER NOTE - CARE PLAN
Principal Discharge DX:	MARCELO (acute kidney injury)  Secondary Diagnosis:	Weakness  Secondary Diagnosis:	Adult failure to thrive   1

## 2024-01-22 NOTE — H&P ADULT - PROBLEM SELECTOR PLAN 4
Patient w/ history of HTN and chronic microvascular disease.  - /71 on admission  - hold ramipril for MARCELO  - continue amlodipine home med w/ parameters  - monitor hemodynamics

## 2024-01-22 NOTE — ED ADULT NURSE NOTE - CHIEF COMPLAINT
Delivery Information for  Girl Sera Thomas    Birth information:  YOB: 2017   Time of birth: 10:30 PM   Sex: female   Head Delivery Date/Time: 2/15/2018 10:30 PM   Delivery type: Vaginal, Spontaneous Delivery   Gestational Age: 37w0d    Delivery Providers    Delivering clinician:  James Monae MD   Provider Role    Brooklyn Cool, RN Registered Nurse    Eneida Hicks, RN Registered Nurse    Leda Briseno, RN Registered Nurse    Cooper Skinner MD Physician                [place] Assessment    Living status:  Living  Apgars:     1 Minute:   5 Minute:   10 Minute:   15 Minute:   20 Minute:     Skin Color:   1  1       Heart Rate:   2  2       Reflex Irritability:   2  2       Muscle Tone:   2  2       Respiratory Effort:   2  2       Total:   9  9               Apgars Assigned By:  DR. SKINNER         Assisted Delivery Details:    Forceps attempted?:  No  Vacuum extractor attempted?:  No         Shoulder Dystocia    Shoulder dystocia present?:  No           Presentation and Position    Presentation:  Vertex  Position:  Left Occiput Anterior           Interventions/Resuscitation    Method:  Bulb Suctioning, Tactile Stimulation       Cord    Vessels:  3 vessels  Complications:  None  Delayed Cord Clamping?:  No  Cord Blood Disposition:  Lab  Gases Sent?:  No  Stem Cell Collection (by MD):  No       Placenta    Date and time:  2/15/2018 10:50 PM  Removal:  Spontaneous  Appearance:  Intact  Placenta disposition:  discarded           Labor Events:       labor: No     Labor Onset Date/Time:         Dilation Complete Date/Time: 02/15/2018 22:51     Start Pushing Date/Time:       Rupture Date/Time:              Rupture type:           Fluid Amount:        Fluid Color:        Fluid Odor:        Membrane Status (PeriCalm):        Rupture Date/Time (PeriCalm):        Fluid Amount (PeriCalm):        Fluid Color (PeriCalm):         steroids: None     Antibiotics given for GBS:  No     Induction: none     Indications for induction:        Augmentation:       Indications for augmentation:       Labor complications: None     Additional complications:          Cervical ripening:                     Delivery:      Episiotomy: None     Indication for Episiotomy:       Perineal Lacerations: 2nd Repaired:  Yes   Periurethral Laceration: midline Repaired:     Labial Laceration: none Repaired:     Sulcus Laceration: none Repaired:     Vaginal Laceration: No Repaired:     Cervical Laceration: No Repaired:     Repair suture:       Repair # of packets: 1     Vaginal delivery QBL (mL): 250      QBL (mL): 0     Combined Blood Loss (mL): 0     Vaginal Sweep Performed: Yes     Surgicount Correct: No       Other providers:       Anesthesia    Method:  None          Details (if applicable):  Trial of Labor      Categorization:      Priority:     Indications for :     Incision Type:       Additional  information:  Forceps:    Vacuum:    Breech:    Observed anomalies    Other (Comments): Precipitous delivery.       DELIVERY NOTE:  Patient undergoes precipitous delivery , cervix changed from 6 to complete in a few  minutes  Delivering on the bed with nursing staff , I arrived for delivery of placenta and birth canal revision  One viable infant sex   (  )M / ( x ) F    , in OA position   Umbilical cord was clamped and cut  , cord  blood sample was  collected   Placenta appears intact  No cervical tears      Nuchal cord= NO  Shoulder dystocia= NO   Apgar 9/9  Amniotic fluid= clear   Placenta= normal intact , complete   Umbilical Cord= 3 vessels     No cervical tears   Episiotomy =         Yes(  )  NO  (x  )   Perineal tears =     Yes( x )  NO  (  ) second degree , repaired under local anesthesia   Vaginal tears=       Yes(  )  NO  ( x )    Note: patient had a previous severe perineal tear on previous delivery , that according to   Patient was not repaired.   There is  not visible anus,and rectum ends at the level of the vaginal  introitus   I repair the vaginal tear up to the edge  of the rectal mucosa . The sphincter is retracted   And is not identified     EBL = 600cc  Sponge, lap, needle counts were correct   Vaginal sweep performed     No complications     James Monae M.D.   OB/GYN         The patient is a 83y Male complaining of weakness.

## 2024-01-22 NOTE — ED PROVIDER NOTE - TEST CONSIDERED BUT NOT PERFORMED
Tests Considered But Not Performed CTA brain considered, but not indicated given lack of LVO-like sx

## 2024-01-22 NOTE — H&P ADULT - HISTORY OF PRESENT ILLNESS
83yM pmhx HTN, T2DM, CVA, brought to ED from home for generalized weakness. Patient had recent GI illness with diarrhea x 3 days that resolved.     ED Course    Vitals: 97.8F, 89bpm, 104/71, 99% RA  Labs: Hgb 12.5, wbc 3.22, Na 133, BUN 29, Cr 1.9, glucose 274, trop wnl  CT head: no acute findings  CXR: no acute findings    Received in ED: 1L NS x 2  83yM pmhx HTN, T2DM, CVA, brought to ED from home for generalized weakness. Patient had recent GI illness with diarrhea x 3 days with "countless" episodes of watery diarrhea. Patient states he's had no appetite for the last 3 days and has been unable to eat at all. Patient has had very low fluid intake. Patient is accompanied by daughter who states that for the last week he has been very weak and he has trouble standing up and walking around. Patient's daughter states that the patient tested positive for the flu last week. The patient denies any symptoms of chest pain, SOB, nausea, vomiting.     ED Course    Vitals: 97.8F, 89bpm, 104/71, 99% RA  Labs: Hgb 12.5, wbc 3.22, Na 133, BUN 29, Cr 1.9, glucose 274, trop wnl  CT head: Parenchymal volume loss and chronic microvessel ischemic changes are identified. Old lacunar infarcts involving the right basal ganglia region are again seen.  CXR: no acute findings  EKG: NSR    Received in ED: 1L NS x 2

## 2024-01-22 NOTE — H&P ADULT - PROBLEM SELECTOR PLAN 2
MARCELO (on CKD) likely 2/2 pre-renal azotemia in the setting of dehydration, diarrhea, poor PO intake, medications   - Baseline creatinine unknown  - Creatinine Currently 1.9  - hold home ace inhibitors in setting of MARCELO  - S/p IVF x 2 in ED  - start maintenance IVFs  - Monitor BMP  - Avoid nephrotoxic medications  - Nephrology consulted

## 2024-01-22 NOTE — ED ADULT TRIAGE NOTE - CHIEF COMPLAINT QUOTE
patient to triage with daughter to ED sent by Dr Amador for IV hydration. patient with diarrhea and loss of appetite for a few days. daughter says he seems to weak and more confused than usual. Afebrile. vitals stable.

## 2024-01-22 NOTE — ED PROVIDER NOTE - OBJECTIVE STATEMENT
pt is a 84yo male with pmhx of htn, dm, cva presents with weakness from home. per daughter pt had recently gi illness with diarrhea for 3 days that since resolved. daughter reports at this time pt did not eat for 3 days and became generally weak being unable to walk well and care for self. pt consulted pcp dr mejía who referred pt to ed for eval. denies fever,cp, sob, n/v.

## 2024-01-22 NOTE — ED ADULT NURSE NOTE - OBJECTIVE STATEMENT
a/ox4 patient came to ED with wife and daughter for increased weakness, difficulty ambulating 2/2 weakness and unsteady gait, decreased PO intake. Patient sent by primary MD for IV hydration and eval. Hx stroke in the past, No residuals. Per daughter "he just doesn't seem right". Flu + two weeks ago. Per wife, these symptoms began after dx w flu. Afebrile. No n/v/d/. Pending further labs and radiology reports.

## 2024-01-22 NOTE — H&P ADULT - PROBLEM SELECTOR PLAN 1
New decrease in ADLS and inability to ambulate, decrease in appetite and PO intake  CT head: Parenchymal volume loss and chronic microvessel ischemic changes are identified. Old lacunar infarcts involving the right basal ganglia region are again seen.  - S/p IVF x 2 in ED  - start maintenance IVFs  - f/u RVP  - f/u electrolytes  - f/u orthostatics, TTE  - PT/OT eval  - Nutrition consulted, start diet w/ ensure New decrease in ADLS and inability to ambulate, decrease in appetite and PO intake  CT head: Parenchymal volume loss and chronic microvessel ischemic changes are identified. Old lacunar infarcts involving the right basal ganglia region are again seen.  - S/p IVF x 2 in ED  - start maintenance IVFs  - f/u RVP, GI PCR  - f/u electrolytes  - f/u orthostatics, TTE  - PT/OT eval  - Nutrition consulted, start diet w/ ensure

## 2024-01-22 NOTE — ED PROVIDER NOTE - CLINICAL SUMMARY MEDICAL DECISION MAKING FREE TEXT BOX
83M with CVA, htn, presents with fatigue, decreased PO, dizziness. Workup reveals MARCELO with Cr of 1.9, new from prior labs. Given age, fatigue, gait changes, will require admission for fluids, and further management of his MARCELO.

## 2024-01-22 NOTE — H&P ADULT - NSHPSOCIALHISTORY_GEN_ALL_CORE
Tobacco: former smoker  Recreational drug use: denies  Ambulates: new decrease in ambulation  ADLs: new decrease in ADLs

## 2024-01-22 NOTE — ED ADULT NURSE NOTE - NSFALLHARMRISKINTERV_ED_ALL_ED

## 2024-01-22 NOTE — H&P ADULT - NSHPPHYSICALEXAM_GEN_ALL_CORE
CONSTITUTIONAL: Well groomed, no apparent distress  EYES: No conjunctival or scleral injection, non-icteric  ENMT: Oral mucosa with dry membranes.   NECK: Supple, symmetric and without tracheal deviation   RESP: No respiratory distress, no use of accessory muscles; CTA b/l, no WRR  CV: RRR, +S1S2, no peripheral edema  GI: Soft, NT, ND  LYMPH: No cervical LAD or tenderness  MSK: Normal ROM without pain, no joint pain   SKIN: No rashes or ulcers noted  NEURO: Sensation intact in upper and lower extremities b/l to light touch, ++ decreased muscle strength in UE/LE b/l   PSYCH: Appropriate insight/judgment; A+O x 3, mood and affect appropriate

## 2024-01-22 NOTE — H&P ADULT - ATTENDING COMMENTS
83yM pmhx HTN, T2DM, CVA, brought to ED from home for generalized weakness, MARCELO, FTT. Patient had recent GI illness with diarrhea x 3 days.    IVF NS hydration, nutrition eval, ensure supp with diet.  PT eval.  Renal consult, will f/u recs, but anticipate CR will improve with hydration.    Ever Baker, Attending Physician

## 2024-01-22 NOTE — ED PROVIDER NOTE - NSICDXPASTMEDICALHX_GEN_ALL_CORE_FT
PAST MEDICAL HISTORY:  CVA (cerebral vascular accident)     Diabetes     Hyperlipidemia     Kidney stone

## 2024-01-22 NOTE — ED PROVIDER NOTE - ATTENDING APP SHARED VISIT CONTRIBUTION OF CARE
I agree with the PA/NP's history/exam/plan as described above, unless otherwise noted below by me.    HPI: This is a 83M with PMH of past CVA, HTN, presents to the ED with weakness. Family has noticed generalized weakness/malaise for past few days. Patient has decreased PO 2/2 no appetite. Denies current chest pain, sob, abd pain, headache, dizziness.     Exam:  Gen: well-appearing, no acute distress  Neuro: awake, alert, oriented x3, normal behavior, CERVANTES freely  HEENT: normocephalic, atraumatic, EOMI, uvula midline, no pharyngeal erythema/edema  Lungs: CTABL  CVS: normal s1/s2, no gallops, rubs  Abd: soft, non-tender, non-distended, no skin changes, no hernia  Extrem: 2+ peripheral symmetric pulses, no ecchymosis, deformity, skin changes, full ROM    Assessment/Plan:  ddx: fatigue, r/o atypical ACS, dehydration, r/o ICH  plan: labs, ekg, cxr, ua, CT brain, bolus  Please refer to dispo tab for MDM/dispo

## 2024-01-22 NOTE — ED ADULT NURSE NOTE - NS ED NURSE PATIENT LEFT UNIT TIME
Lead Screen Questionnaire    Lead Screening Questionaire Lead Test Required                       Blood Test Result #1: <3 mcg/dL, Date: 10/8/2018        Signatures   Electronically signed by : Franco Pretty CMA; Oct  8 2018  3:24PM CST    Electronically signed by : DERRICK HERNANDEZ D.O.; Oct 10 2018  1:00PM CST    Electronically signed by : ANKUR AVALOS D.O.; Oct 21 2018  9:38PM CST     01:55

## 2024-01-22 NOTE — H&P ADULT - NSHPREVIEWOFSYSTEMS_GEN_ALL_CORE
REVIEW OF SYSTEMS:  CONSTITUTIONAL: ++ decrease in appetite, new decrease in activity level and ADLs, No fever/chills  HENMT: No HA, lightheadedness/dizziness  RESPIRATORY: No cough, wheezing, hemoptysis; No shortness of breath.  CARDIOVASCULAR: No chest pain, palpitations.  GASTROINTESTINAL: ++ diarrhea, No abdominal or epigastric pain. No nausea or vomiting; No constipation.  GENITOURINARY: No dysuria, changes in frequency, hematuria, or incontinence  NEUROLOGICAL: ++ weakness. Sensation intact bilaterally.  SKIN: No itching, rashes  MUSCULOSKELETAL: No joint pain or swelling; No muscle, back, or extremity pain

## 2024-01-23 ENCOUNTER — TRANSCRIPTION ENCOUNTER (OUTPATIENT)
Age: 84
End: 2024-01-23

## 2024-01-23 DIAGNOSIS — E87.8 OTHER DISORDERS OF ELECTROLYTE AND FLUID BALANCE, NOT ELSEWHERE CLASSIFIED: ICD-10-CM

## 2024-01-23 LAB
A1C WITH ESTIMATED AVERAGE GLUCOSE RESULT: 7.2 % — HIGH (ref 4–5.6)
ALBUMIN SERPL ELPH-MCNC: 3.2 G/DL — LOW (ref 3.3–5)
ALP SERPL-CCNC: 58 U/L — SIGNIFICANT CHANGE UP (ref 40–120)
ALT FLD-CCNC: 30 U/L — SIGNIFICANT CHANGE UP (ref 12–78)
ANION GAP SERPL CALC-SCNC: 7 MMOL/L — SIGNIFICANT CHANGE UP (ref 5–17)
APPEARANCE UR: CLEAR — SIGNIFICANT CHANGE UP
AST SERPL-CCNC: 35 U/L — SIGNIFICANT CHANGE UP (ref 15–37)
BACTERIA # UR AUTO: ABNORMAL /HPF
BASOPHILS # BLD AUTO: 0 K/UL — SIGNIFICANT CHANGE UP (ref 0–0.2)
BASOPHILS NFR BLD AUTO: 0 % — SIGNIFICANT CHANGE UP (ref 0–2)
BILIRUB SERPL-MCNC: 0.9 MG/DL — SIGNIFICANT CHANGE UP (ref 0.2–1.2)
BILIRUB UR-MCNC: NEGATIVE — SIGNIFICANT CHANGE UP
BUN SERPL-MCNC: 24 MG/DL — HIGH (ref 7–23)
CALCIUM SERPL-MCNC: 8.9 MG/DL — SIGNIFICANT CHANGE UP (ref 8.5–10.1)
CHLORIDE SERPL-SCNC: 107 MMOL/L — SIGNIFICANT CHANGE UP (ref 96–108)
CHOLEST SERPL-MCNC: 123 MG/DL — SIGNIFICANT CHANGE UP
CO2 SERPL-SCNC: 24 MMOL/L — SIGNIFICANT CHANGE UP (ref 22–31)
COLOR SPEC: YELLOW — SIGNIFICANT CHANGE UP
COMMENT - URINE: SIGNIFICANT CHANGE UP
CREAT SERPL-MCNC: 1.1 MG/DL — SIGNIFICANT CHANGE UP (ref 0.5–1.3)
DIFF PNL FLD: NEGATIVE — SIGNIFICANT CHANGE UP
EGFR: 67 ML/MIN/1.73M2 — SIGNIFICANT CHANGE UP
EOSINOPHIL # BLD AUTO: 0.12 K/UL — SIGNIFICANT CHANGE UP (ref 0–0.5)
EOSINOPHIL NFR BLD AUTO: 3.7 % — SIGNIFICANT CHANGE UP (ref 0–6)
EPI CELLS # UR: PRESENT
ESTIMATED AVERAGE GLUCOSE: 160 MG/DL — HIGH (ref 68–114)
GLUCOSE SERPL-MCNC: 99 MG/DL — SIGNIFICANT CHANGE UP (ref 70–99)
GLUCOSE UR QL: 100 MG/DL
HCT VFR BLD CALC: 33.1 % — LOW (ref 39–50)
HDLC SERPL-MCNC: 41 MG/DL — SIGNIFICANT CHANGE UP
HGB BLD-MCNC: 11.9 G/DL — LOW (ref 13–17)
IMM GRANULOCYTES NFR BLD AUTO: 0.3 % — SIGNIFICANT CHANGE UP (ref 0–0.9)
KETONES UR-MCNC: NEGATIVE MG/DL — SIGNIFICANT CHANGE UP
LEUKOCYTE ESTERASE UR-ACNC: NEGATIVE — SIGNIFICANT CHANGE UP
LIPID PNL WITH DIRECT LDL SERPL: 58 MG/DL — SIGNIFICANT CHANGE UP
LYMPHOCYTES # BLD AUTO: 0.77 K/UL — LOW (ref 1–3.3)
LYMPHOCYTES # BLD AUTO: 23.8 % — SIGNIFICANT CHANGE UP (ref 13–44)
MAGNESIUM SERPL-MCNC: 1.7 MG/DL — SIGNIFICANT CHANGE UP (ref 1.6–2.6)
MCHC RBC-ENTMCNC: 32.4 PG — SIGNIFICANT CHANGE UP (ref 27–34)
MCHC RBC-ENTMCNC: 36 GM/DL — SIGNIFICANT CHANGE UP (ref 32–36)
MCV RBC AUTO: 90.2 FL — SIGNIFICANT CHANGE UP (ref 80–100)
MONOCYTES # BLD AUTO: 0.66 K/UL — SIGNIFICANT CHANGE UP (ref 0–0.9)
MONOCYTES NFR BLD AUTO: 20.4 % — HIGH (ref 2–14)
NEUTROPHILS # BLD AUTO: 1.67 K/UL — LOW (ref 1.8–7.4)
NEUTROPHILS NFR BLD AUTO: 51.8 % — SIGNIFICANT CHANGE UP (ref 43–77)
NITRITE UR-MCNC: NEGATIVE — SIGNIFICANT CHANGE UP
NON HDL CHOLESTEROL: 81 MG/DL — SIGNIFICANT CHANGE UP
NRBC # BLD: 0 /100 WBCS — SIGNIFICANT CHANGE UP (ref 0–0)
PH UR: 5 — SIGNIFICANT CHANGE UP (ref 5–8)
PHOSPHATE SERPL-MCNC: 2.5 MG/DL — SIGNIFICANT CHANGE UP (ref 2.5–4.5)
PLATELET # BLD AUTO: 104 K/UL — LOW (ref 150–400)
POTASSIUM SERPL-MCNC: 3.5 MMOL/L — SIGNIFICANT CHANGE UP (ref 3.5–5.3)
POTASSIUM SERPL-SCNC: 3.5 MMOL/L — SIGNIFICANT CHANGE UP (ref 3.5–5.3)
PROT SERPL-MCNC: 6.3 G/DL — SIGNIFICANT CHANGE UP (ref 6–8.3)
PROT UR-MCNC: 100 MG/DL
RAPID RVP RESULT: SIGNIFICANT CHANGE UP
RBC # BLD: 3.67 M/UL — LOW (ref 4.2–5.8)
RBC # FLD: 12.1 % — SIGNIFICANT CHANGE UP (ref 10.3–14.5)
RBC CASTS # UR COMP ASSIST: 0 /HPF — SIGNIFICANT CHANGE UP (ref 0–4)
SARS-COV-2 RNA SPEC QL NAA+PROBE: SIGNIFICANT CHANGE UP
SODIUM SERPL-SCNC: 138 MMOL/L — SIGNIFICANT CHANGE UP (ref 135–145)
SP GR SPEC: 1.01 — SIGNIFICANT CHANGE UP (ref 1–1.03)
TRIGL SERPL-MCNC: 133 MG/DL — SIGNIFICANT CHANGE UP
TSH SERPL-MCNC: 1.64 UIU/ML — SIGNIFICANT CHANGE UP (ref 0.36–3.74)
UROBILINOGEN FLD QL: 0.2 MG/DL — SIGNIFICANT CHANGE UP (ref 0.2–1)
WBC # BLD: 3.23 K/UL — LOW (ref 3.8–10.5)
WBC # FLD AUTO: 3.23 K/UL — LOW (ref 3.8–10.5)
WBC UR QL: 1 /HPF — SIGNIFICANT CHANGE UP (ref 0–5)

## 2024-01-23 PROCEDURE — 74176 CT ABD & PELVIS W/O CONTRAST: CPT | Mod: 26

## 2024-01-23 PROCEDURE — 99233 SBSQ HOSP IP/OBS HIGH 50: CPT | Mod: GC

## 2024-01-23 RX ORDER — INFLUENZA VIRUS VACCINE 15; 15; 15; 15 UG/.5ML; UG/.5ML; UG/.5ML; UG/.5ML
0.7 SUSPENSION INTRAMUSCULAR ONCE
Refills: 0 | Status: DISCONTINUED | OUTPATIENT
Start: 2024-01-23 | End: 2024-01-26

## 2024-01-23 RX ORDER — TAMSULOSIN HYDROCHLORIDE 0.4 MG/1
0.4 CAPSULE ORAL AT BEDTIME
Refills: 0 | Status: DISCONTINUED | OUTPATIENT
Start: 2024-01-23 | End: 2024-01-26

## 2024-01-23 RX ORDER — POTASSIUM CHLORIDE 20 MEQ
40 PACKET (EA) ORAL ONCE
Refills: 0 | Status: COMPLETED | OUTPATIENT
Start: 2024-01-23 | End: 2024-01-23

## 2024-01-23 RX ORDER — MAGNESIUM SULFATE 500 MG/ML
2 VIAL (ML) INJECTION ONCE
Refills: 0 | Status: COMPLETED | OUTPATIENT
Start: 2024-01-23 | End: 2024-01-23

## 2024-01-23 RX ADMIN — ATORVASTATIN CALCIUM 20 MILLIGRAM(S): 80 TABLET, FILM COATED ORAL at 20:37

## 2024-01-23 RX ADMIN — SODIUM CHLORIDE 75 MILLILITER(S): 9 INJECTION, SOLUTION INTRAVENOUS at 01:29

## 2024-01-23 RX ADMIN — Medication 2: at 17:11

## 2024-01-23 RX ADMIN — TAMSULOSIN HYDROCHLORIDE 0.4 MILLIGRAM(S): 0.4 CAPSULE ORAL at 20:38

## 2024-01-23 RX ADMIN — Medication 40 MILLIEQUIVALENT(S): at 13:16

## 2024-01-23 RX ADMIN — Medication 2: at 12:15

## 2024-01-23 RX ADMIN — ATORVASTATIN CALCIUM 20 MILLIGRAM(S): 80 TABLET, FILM COATED ORAL at 01:29

## 2024-01-23 RX ADMIN — Medication 25 GRAM(S): at 13:16

## 2024-01-23 RX ADMIN — AMLODIPINE BESYLATE 5 MILLIGRAM(S): 2.5 TABLET ORAL at 05:48

## 2024-01-23 RX ADMIN — HEPARIN SODIUM 5000 UNIT(S): 5000 INJECTION INTRAVENOUS; SUBCUTANEOUS at 01:29

## 2024-01-23 NOTE — PROGRESS NOTE ADULT - TIME BILLING
Pt seen + examined on 1/23. Case discussed with resident. Agree with assessment and plan above (edited by me in detail):  Time spent: 51min. Time spent discussing/coordinating care with consultants, CM/SW team, and counseling the patient and pt's family on medical condition -- acute urinary retention, MARCELO, dehydration, generalized weakness.     83yM PMH of nephrolithiasis (multiple lithotripsies), HTN, T2DM, hx of CVA, p/w generalized weakness, diarrhea, and poor balance a/w MARCELO, diarrhea, acute urinary retention.    - MARCELO likely due to prerenal azotemia from diarrhea at home, but also likely due to acute urinary retention  - Overnight, pt was not able to urinate and had >1000mL of retained urine drained with a florez catheter.  - will start flomax  - Baseline creatinine unknown  - MARCELO likely resolved now  - Creatinine 1.1 (down from 1.9 on admission)  - held home ace inhibitors in setting of MARCELO -- if stable, may restart tomorrow  - S/p IVF x 2 in ED  - stop maintenance IVFs due to adequate hydration status  - Monitor BMP  - Avoid nephrotoxic medications  - Nephrology (Dr. Ruiz), recs appreciated  - urology consulted (Dr. Irving who is pt's outpt urologist, as well)    - New decrease in ADLs and inability to ambulate, decrease in appetite and PO intake -- suspect had a gastroenteritis given diarrhea that was self-limited   - CT head: Parenchymal volume loss and chronic microvessel ischemic changes are identified. Old lacunar infarcts involving the right basal ganglia region are again seen.  - S/p IVF x 2 in ED  - stop maintenance IVFs  - f/u RVP (negative), GI PCR (pending)  - f/u orthostatics, TTE  - PT/OT eval  - Nutrition consulted, start diet w/ ensure  - Neuro consulted for reported balance instability if feel need to pursue further imaging Pt seen + examined on 1/23. Case discussed with resident. Agree with assessment and plan above (edited by me in detail):  Time spent: 51min. Time spent discussing/coordinating care with consultants, CM/SW team, and counseling the patient and pt's family on medical condition -- acute urinary retention, MARCELO, dehydration, generalized weakness.     82yo M w/ PMH of nephrolithiasis (multiple lithotripsies), HTN, T2DM, hx of CVA, p/w generalized weakness, diarrhea, and poor balance a/w MARCELO, diarrhea, acute urinary retention.    - MARCELO likely due to prerenal azotemia from diarrhea at home, but also likely due to acute urinary retention  - Overnight, pt was not able to urinate and had >1000mL of retained urine drained with a florez catheter.  - will start flomax  - unclear why pt was retaining urine; pt does have an enlarged prostate and perhaps decrease in mobility when he was feeling more poorly at home contributed to the retention  - no UTI symptoms and UA without signs of infection  - check CT Abd/P (pt with hx of nephrolithiasis with multiple lithotripsies in the past  - MARCELO likely resolved now  - Creatinine 1.1 (down from 1.9 on admission)  - held home ace inhibitors in setting of MARCELO -- if stable, may restart tomorrow  - S/p IVF x 2 in ED  - stop maintenance IVFs due to adequate hydration status  - Monitor BMP  - Avoid nephrotoxic medications  - Nephrology (Dr. Ruiz), recs appreciated  - urology consulted (Dr. Irving who is pt's outpt urologist, as well)    - New decrease in ADLs with difficulty ambulating/poor balance, decrease in appetite and PO intake   - suspect pt had a gastroenteritis with dehydration, given diarrhea that was self-limited   - CT head: Parenchymal volume loss and chronic microvessel ischemic changes are identified. Old lacunar infarcts involving the right basal ganglia region are again seen.  - S/p NS 2L in ED  - stop maintenance IVFs and enocurage po intake  - f/u RVP (negative), GI PCR (pending)  - f/u orthostatics, TTE  - PT/OT eval  - Nutrition consulted, start diet w/ ensure  - Neuro (Dr. Brown) consulted for reported balance instability if feel need to pursue further imaging Pt seen + examined on 1/23. Case discussed with resident. Agree with assessment and plan above (edited by me in detail):  Time spent: 51min. Time spent discussing/coordinating care with consultants, CM/SW team, and counseling the patient and pt's family on medical condition -- acute urinary retention, MARCELO, dehydration, generalized weakness.     82yo M w/ PMH of nephrolithiasis (multiple lithotripsies), HTN, T2DM, hx of CVA, p/w generalized weakness, diarrhea, and poor balance a/w MARCELO, diarrhea, acute urinary retention.    - MARCELO likely due to prerenal azotemia from diarrhea at home, but also likely due to acute urinary retention  - Overnight, pt was not able to urinate and had >1000mL of retained urine drained with a florez catheter.  - will start flomax  - unclear why pt was retaining urine; pt does have an enlarged prostate and perhaps decrease in mobility when he was feeling more poorly at home contributed to the retention  - no UTI symptoms and UA without signs of infection  - check CT Abd/P (pt with hx of nephrolithiasis with multiple lithotripsies in the past  - MARCELO likely resolved now  - Creatinine 1.1 (down from 1.9 on admission)  - held home ace inhibitors in setting of MARCELO -- if stable, may restart tomorrow  - S/p IVF x 2 in ED  - stop maintenance IVFs due to adequate hydration status  - Monitor BMP  - Avoid nephrotoxic medications  - Nephrology (Dr. Ruiz), recs appreciated  - urology consulted (Dr. Irving who is pt's outpt urologist, as well)    - New decrease in ADLs with difficulty ambulating/poor balance, decrease in appetite and PO intake   - suspect pt had a gastroenteritis with dehydration, given diarrhea that was self-limited   - CT head: Parenchymal volume loss and chronic microvessel ischemic changes are identified. Old lacunar infarcts involving the right basal ganglia region are again seen.  - S/p NS 2L in ED  - stop maintenance IVFs and enocurage po intake  - f/u RVP (negative), GI PCR (pending)  - f/u orthostatics, TTE  - PT/OT eval  - Nutrition consulted, start diet w/ ensure  - Neuro (Dr. Brown) consulted for reported balance instability if feel need to pursue further imaging    - of note, pt stated his stool was very dark when he had diarrhea for 3 days at home. Doubt melena because per pt's daughter who spoke to his PCP's office, Hgb was ~12 at last visit and if he had 3 days of melena, would expect bigger drop in Hgb on this admission.   - monitor closely and check stool here

## 2024-01-23 NOTE — OCCUPATIONAL THERAPY INITIAL EVALUATION ADULT - BALANCE TRAINING, PT EVAL
unknown Pt will improve standing dynamic balance from F+ to Good by 1-2 sessions in prep for standing ADLs.

## 2024-01-23 NOTE — CONSULT NOTE ADULT - ASSESSMENT
MARCELO: Prerenal azotemia  Hypertension  h/o Nephrolithiasis (F/u with Dr. Irving as out pt)  Diabetes    IV hydration. Will follow creatinine trend. If worsening will get kidney and bladder sonogram. Monitor blood sugar levels. Insulin coverage as needed.   Monitor BP trend. Titrate BP meds as needed. Salt restriction. Will follow electrolytes and renal function trend.     Further recommendations pending clinical course. Thank you for the courtesy of this referral.  MARCELO: Prerenal azotemia  Proteinuria: ? Diabetic nephropathy  Hypertension  h/o Nephrolithiasis (F/u with Dr. Irving as out pt)  Diabetes    IV hydration. Will follow creatinine trend. If worsening will get kidney and bladder sonogram. Check urine p/c. Monitor blood sugar levels. Insulin coverage as needed.   Monitor BP trend. Titrate BP meds as needed. Salt restriction. Will follow electrolytes and renal function trend. D/w patient regarding need for out patient nephrology follow up.     Further recommendations pending clinical course. Thank you for the courtesy of this referral.

## 2024-01-23 NOTE — OCCUPATIONAL THERAPY INITIAL EVALUATION ADULT - PERTINENT HX OF CURRENT PROBLEM, REHAB EVAL
As per H&P: "83yM pmhx HTN, T2DM, CVA, brought to ED from home for generalized weakness. Patient had recent GI illness with diarrhea x 3 days with "countless" episodes of watery diarrhea. Patient states he's had no appetite for the last 3 days and has been unable to eat at all. Patient has had very low fluid intake. Patient is accompanied by daughter who states that for the last week he has been very weak and he has trouble standing up and walking around. Patient's daughter states that the patient tested positive for the flu last week. The patient denies any symptoms of chest pain, SOB, nausea, vomiting. "  CT Head: "Parenchymal volume loss and chronic microvessel ischemic changes are identified. Old lacunar infarcts involving the right basal ganglia region are again seen. This of acute hemorrhage mass or mass effect seen. Impression: Stable exam."  Admit dx: Acute renal failure

## 2024-01-23 NOTE — PATIENT PROFILE ADULT - FUNCTIONAL ASSESSMENT - DAILY ACTIVITY 3.
Writer informed by Norman ALARCON that there are no beds available    4 = No assist / stand by assistance

## 2024-01-23 NOTE — DISCHARGE NOTE PROVIDER - HOSPITAL COURSE
HPI:  83yM pmhx HTN, T2DM, CVA, brought to ED from home for generalized weakness. Patient had recent GI illness with diarrhea x 3 days with "countless" episodes of watery diarrhea. Patient states he's had no appetite for the last 3 days and has been unable to eat at all. Patient has had very low fluid intake. Patient is accompanied by daughter who states that for the last week he has been very weak and he has trouble standing up and walking around. Patient's daughter states that the patient tested positive for the flu last week. The patient denies any symptoms of chest pain, SOB, nausea, vomiting.     ED Course    Vitals: 97.8F, 89bpm, 104/71, 99% RA  Labs: Hgb 12.5, wbc 3.22, Na 133, BUN 29, Cr 1.9, glucose 274, trop wnl  CT head: Parenchymal volume loss and chronic microvessel ischemic changes are identified. Old lacunar infarcts involving the right basal ganglia region are again seen.  CXR: no acute findings  EKG: NSR    Received in ED: 1L NS x 2  (22 Jan 2024 15:54)      ---  HOSPITAL COURSE: Patient admitted to medicine floor for management of generalized weakness.    Patient was continued on IVF. Patient's emerald resolved. Patient was able to ambulate independently. Neuro was consulted for weakness.    Pt seen and examined on day of discharge. Patient is medically optimized for discharge to home with close outpatient followup.    PHYSICAL EXAM ON DAY OF DISCHARGE:  The patient was seen and examined on the day of discharge. Please see progress note from day of discharge for further information.         ---  CONSULTANTS:   Neuro: Dr. Brown  ---  TIME SPENT:  I, the attending physician, was physically present for the key portions of the evaluation and management (E/M) service provided. The total amount of time spent reviewing the hospital notes, laboratory values, imaging findings, assessing/counseling the patient, discussing with consultant physicians, social work, nursing staff was -- minutes    ---  Primary care provider was made aware of plan for discharge:      [  ] NO     [  ] YES   HPI:  83yM pmhx HTN, T2DM, CVA, brought to ED from home for generalized weakness. Patient had recent GI illness with diarrhea x 3 days with "countless" episodes of watery diarrhea. Patient states he's had no appetite for the last 3 days and has been unable to eat at all. Patient has had very low fluid intake. Patient is accompanied by daughter who states that for the last week he has been very weak and he has trouble standing up and walking around. Patient's daughter states that the patient tested positive for the flu last week. The patient denies any symptoms of chest pain, SOB, nausea, vomiting.     ED Course  Vitals: 97.8F, 89bpm, 104/71, 99% RA  Labs: Hgb 12.5, wbc 3.22, Na 133, BUN 29, Cr 1.9, glucose 274, trop wnl  CT head: Parenchymal volume loss and chronic microvessel ischemic changes are identified. Old lacunar infarcts involving the right basal ganglia region are again seen.  CXR: no acute findings  EKG: NSR  Received in ED: 1L NS x 2  (22 Jan 2024 15:54)      HOSPITAL COURSE: Patient admitted to medicine floor for management of generalized weakness, MARCELO and hyponatremia in the setting of poor oral intake and diarrhea.  Nephro, neuro, urology were consulted. Patient was continued on IVF with Patient's MARCELO resolved. Patient was able to ambulate independently. Neuro was consulted for weakness. MRI was done with No evidence of a mass or current evidence of acute ischemia, chronic lacunar infarcts and white matter ischemic changes seen.     Pt seen and examined on day of discharge. Patient is medically optimized for discharge to home with close outpatient followup.      PHYSICAL EXAM ON DAY OF DISCHARGE:  The patient was seen and examined on the day of discharge. Please see progress note from day of discharge for further information.       CONSULTANTS:   Neuro: Dr. Brown    TIME SPENT:  I, the attending physician, was physically present for the key portions of the evaluation and management (E/M) service provided. The total amount of time spent reviewing the hospital notes, laboratory values, imaging findings, assessing/counseling the patient, discussing with consultant physicians, social work, nursing staff was -- minutes    ---  Primary care provider was made aware of plan for discharge:      [  ] NO     [  ] YES   HPI:  83yM pmhx HTN, T2DM, CVA, brought to ED from home for generalized weakness. Patient had recent GI illness with diarrhea x 3 days with "countless" episodes of watery diarrhea. Patient states he's had no appetite for the last 3 days and has been unable to eat at all. Patient has had very low fluid intake. Patient is accompanied by daughter who states that for the last week he has been very weak and he has trouble standing up and walking around. Patient's daughter states that the patient tested positive for the flu last week. The patient denies any symptoms of chest pain, SOB, nausea, vomiting.     ED Course  Vitals: 97.8F, 89bpm, 104/71, 99% RA  Labs: Hgb 12.5, wbc 3.22, Na 133, BUN 29, Cr 1.9, glucose 274, trop wnl  CT head: Parenchymal volume loss and chronic microvessel ischemic changes are identified. Old lacunar infarcts involving the right basal ganglia region are again seen.  CXR: no acute findings  EKG: NSR  Received in ED: 1L NS x 2  (22 Jan 2024 15:54)      HOSPITAL COURSE: Patient admitted to medicine floor for management of generalized weakness, MARCELO and hyponatremia in the setting of poor oral intake and diarrhea.  Nephro, neuro, urology were consulted. Patient was continued on IVF.  After admission pt unable to urinate and with >1000mL of retained urine drained with a florez catheter. Flomax was started, and unclear why pt was retaining urine; pt does have an enlarged prostate and perhaps decrease in mobility when he was feeling more poorly at home contributed to the retention. no UTI symptoms and UA without signs of infection. CT Abd/P showed Bilateral nonobstructing intrarenal calculi, prostatomegaly. Urology recommended to continue Flomax and later on TOV. Patient's MARCELO resolved. Neuro was consulted for weakness. MRI was done with No evidence of a mass or current evidence of acute ischemia, chronic lacunar infarcts and white matter ischemic changes seen. Patient was able to ambulate independently while in the hospital and PT evaluation recommended to discharge to home with Home PT.     Pt seen and examined on day of discharge. Patient is medically optimized for discharge to home with close outpatient followup.      PHYSICAL EXAM ON DAY OF DISCHARGE:  The patient was seen and examined on the day of discharge. Please see progress note from day of discharge for further information.       CONSULTANTS:   Neuro: Dr. Stephanie Ruiz  Urology Dr. Barrera.     TIME SPENT:  I, the attending physician, was physically present for the key portions of the evaluation and management (E/M) service provided. The total amount of time spent reviewing the hospital notes, laboratory values, imaging findings, assessing/counseling the patient, discussing with consultant physicians, social work, nursing staff was -- minutes    ---  Primary care provider was made aware of plan for discharge:      [  ] NO     [  ] YES   HPI:  83yM pmhx HTN, T2DM, CVA, brought to ED from home for generalized weakness. Patient had recent GI illness with diarrhea x 3 days with "countless" episodes of watery diarrhea. Patient states he's had no appetite for the last 3 days and has been unable to eat at all. Patient has had very low fluid intake. Patient is accompanied by daughter who states that for the last week he has been very weak and he has trouble standing up and walking around. Patient's daughter states that the patient tested positive for the flu last week. The patient denies any symptoms of chest pain, SOB, nausea, vomiting.     ED Course  Vitals: 97.8F, 89bpm, 104/71, 99% RA  Labs: Hgb 12.5, wbc 3.22, Na 133, BUN 29, Cr 1.9, glucose 274, trop wnl  CT head: Parenchymal volume loss and chronic microvessel ischemic changes are identified. Old lacunar infarcts involving the right basal ganglia region are again seen.  CXR: no acute findings  EKG: NSR  Received in ED: 1L NS x 2  (22 Jan 2024 15:54)      HOSPITAL COURSE: Patient admitted to medicine floor for management of generalized weakness, MARCELO and hyponatremia in the setting of poor oral intake and diarrhea.  Nephro, neuro, urology were consulted. Patient was continued on IVF.  After admission pt unable to urinate and with >1000mL of retained urine drained with a florez catheter. Flomax was started, and unclear why pt was retaining urine; pt does have an enlarged prostate and perhaps decrease in mobility when he was feeling more poorly at home contributed to the retention. no UTI symptoms and UA without signs of infection. CT Abd/P showed Bilateral nonobstructing intrarenal calculi, prostatomegaly. Urology recommended to continue Flomax and later on TOV. Patient's MARCELO resolved. Neuro was consulted for weakness. MRI was done with No evidence of a mass or current evidence of acute ischemia, chronic lacunar infarcts and white matter ischemic changes seen. Patient was able to ambulate independently while in the hospital and PT evaluation recommended to discharge to home with Home PT.     Pt seen and examined on day of discharge. Patient is medically optimized for discharge to home with close outpatient followup.      PHYSICAL EXAM ON DAY OF DISCHARGE:  The patient was seen and examined on the day of discharge. Please see progress note from day of discharge for further information.   GENERAL: NAD  HEENT:  anicteric, moist mucous membranes  CHEST/LUNG:  CTA b/l, no rales, wheezes, or rhonchi  HEART:  RRR, S1, S2  ABDOMEN:  BS+, soft, nontender, nondistended  EXTREMITIES: no edema, cyanosis, or calf tenderness, florez in place draining  NERVOUS SYSTEM: answers questions and follows commands appropriately    ROS: Negative for cp, sob, abd pain    CONSULTANTS:   Neuro: Dr. Brown  Nephro Dr. Ruiz  Urology Dr. Barrera.     TIME SPENT:  I, the attending physician, was physically present for the key portions of the evaluation and management (E/M) service provided. The total amount of time spent reviewing the hospital notes, laboratory values, imaging findings, assessing/counseling the patient, discussing with consultant physicians, social work, nursing staff was -- minutes    ---  Primary care provider was made aware of plan for discharge:      [  ] NO     [  ] YES   HPI:  83yM pmhx HTN, T2DM, CVA, brought to ED from home for generalized weakness. Patient had recent GI illness with diarrhea x 3 days with "countless" episodes of watery diarrhea. Patient states he's had no appetite for the last 3 days and has been unable to eat at all. Patient has had very low fluid intake. Patient is accompanied by daughter who states that for the last week he has been very weak and he has trouble standing up and walking around. Patient's daughter states that the patient tested positive for the flu last week. The patient denies any symptoms of chest pain, SOB, nausea, vomiting.     ED Course  Vitals: 97.8F, 89bpm, 104/71, 99% RA  Labs: Hgb 12.5, wbc 3.22, Na 133, BUN 29, Cr 1.9, glucose 274, trop wnl  CT head: Parenchymal volume loss and chronic microvessel ischemic changes are identified. Old lacunar infarcts involving the right basal ganglia region are again seen.  CXR: no acute findings  EKG: NSR  Received in ED: 1L NS x 2  (22 Jan 2024 15:54)      HOSPITAL COURSE: Patient admitted to medicine floor for management of generalized weakness, MARCELO and hyponatremia in the setting of poor oral intake and diarrhea.  Nephro, neuro, urology were consulted. Patient was continued on IVF.  After admission pt unable to urinate and with >1000mL of retained urine drained with a florez catheter. Flomax was started, and unclear why pt was retaining urine; pt does have an enlarged prostate and perhaps decrease in mobility when he was feeling more poorly at home contributed to the retention. no UTI symptoms and UA without signs of infection. CT Abd/P showed Bilateral nonobstructing intrarenal calculi, prostatomegaly. Urology recommended to continue Flomax and later on TOV. Patient's MARCELO resolved. Neuro was consulted for weakness. MRI was done with No evidence of a mass or current evidence of acute ischemia, chronic lacunar infarcts and white matter ischemic changes seen. Patient was able to ambulate independently while in the hospital and PT evaluation recommended to discharge to home with Home PT.     Pt seen and examined on day of discharge. Patient is medically optimized for discharge to home with close outpatient followup. Patient voided with voiding trial. voided 2-3 times after florez removal with PVR 46 cc. Patient to follow up with urology as needed outpatient       PHYSICAL EXAM ON DAY OF DISCHARGE:  The patient was seen and examined on the day of discharge. Please see progress note from day of discharge for further information.   GENERAL: NAD  HEENT:  anicteric, moist mucous membranes  CHEST/LUNG:  CTA b/l, no rales, wheezes, or rhonchi  HEART:  RRR, S1, S2  ABDOMEN:  BS+, soft, nontender, nondistended  EXTREMITIES: no edema, cyanosis, or calf tenderness, florez in place draining  NERVOUS SYSTEM: answers questions and follows commands appropriately    ROS: Negative for cp, sob, abd pain    CONSULTANTS:   Neuro: Dr. Brown  Nephro Dr. Ruiz  Urology Dr. Barrera.     TIME SPENT:  I, the attending physician, was physically present for the key portions of the evaluation and management (E/M) service provided. The total amount of time spent reviewing the hospital notes, laboratory values, imaging findings, assessing/counseling the patient, discussing with consultant physicians, social work, nursing staff was 50 minutes

## 2024-01-23 NOTE — PROGRESS NOTE ADULT - PROBLEM SELECTOR PLAN 5
Blood glucose 274 on admission  - start low dose ISS, bedtime corrective   - monitor BG Patient w/ history of HTN and chronic microvascular disease.  - /71 on admission  - pending reinitiation of ramipril post resolved MARCELO  - continue amlodipine home med w/ parameters  - monitor hemodynamics

## 2024-01-23 NOTE — PROGRESS NOTE ADULT - PROBLEM SELECTOR PLAN 7
Hypomagnesemia  -2g of magnesium sulfate IV ordered    Hypokalemia  -40mEv of potassium chloride PO - Hep sub q 5000q8

## 2024-01-23 NOTE — OCCUPATIONAL THERAPY INITIAL EVALUATION ADULT - NSOTDMEREC_GEN_A_CORE
The patient has a mobility limitation that significantly impairs their ability to participate in one or more mobility related activities of daily living within the home.  By utilizing a rolling walker/rollator the functional mobility deficit can be sufficiently resolved.  The patient demonstrates safe use of rolling walker/rollator./rolling walker

## 2024-01-23 NOTE — DISCHARGE NOTE PROVIDER - NSDCCPCAREPLAN_GEN_ALL_CORE_FT
PRINCIPAL DISCHARGE DIAGNOSIS  Diagnosis: MARCELO (acute kidney injury)  Assessment and Plan of Treatment: You were diagnosed with acute kidney injury on admission. You were given fluids and were evaluated by nephrology with improvement.  Please avoid taking NSAIDS (medications such as ibuprofen, Advil, Aleve, naproxen, Motrin, etc.) as those medications can worsen kidney function. You may take Tylenol or acetaminophen, if needed, per recommendations listed on the box.   Please follow up with your PCP and/or nephrologist within a week of discharge. Your creatinine now has improved to ~        SECONDARY DISCHARGE DIAGNOSES  Diagnosis: Weakness  Assessment and Plan of Treatment: You were addmitted for weakness. You were seen by Neuro.    Diagnosis: Adult failure to thrive  Assessment and Plan of Treatment:      PRINCIPAL DISCHARGE DIAGNOSIS  Diagnosis: MARCELO (acute kidney injury)  Assessment and Plan of Treatment: You were diagnosed with acute kidney injury on admission. You were given fluids and were evaluated by nephrology with improvement.  Please avoid taking NSAIDS (medications such as ibuprofen, Advil, Aleve, naproxen, Motrin, etc.) as those medications can worsen kidney function. You may take Tylenol or acetaminophen, if needed, per recommendations listed on the box.   Please follow up with your PCP and/or nephrologist within a week of discharge. Your creatinine now has improved to ~        SECONDARY DISCHARGE DIAGNOSES  Diagnosis: Weakness  Assessment and Plan of Treatment: You were addmitted for weakness.  You were seen by Neuro, CT scan and MRI of head were done with no signs of acute stroke, and showing old CVA.    Diagnosis: Adult failure to thrive  Assessment and Plan of Treatment:      PRINCIPAL DISCHARGE DIAGNOSIS  Diagnosis: MARCELO (acute kidney injury)  Assessment and Plan of Treatment: You were diagnosed with acute kidney injury on admission likely secondary to dehydration.  You were given fluids and were evaluated by nephrology and it resolved.   Please avoid taking NSAIDS (medications such as ibuprofen, Advil, Aleve, naproxen, Motrin, etc.) as those medications can worsen kidney function. You may take Tylenol or acetaminophen, if needed for pain.   Please follow up with your PCP and nephrologist within a week of discharge.         SECONDARY DISCHARGE DIAGNOSES  Diagnosis: Weakness  Assessment and Plan of Treatment: You were addmitted for weakness.  You were seen by Neuro, CT scan and MRI of head were done with no signs of acute stroke, and showing old CVA.    Diagnosis: Hyperplasia of prostate with urinary retention  Assessment and Plan of Treatment: You presented urinary retention in the hospital and a Pineda catheter was placed. You receive Flomax to help with the symptoms. CT scan of abdomen and pelvis showed enlargement of the prostate and bilateral nonobstructing intrarenal calculi. Urology evaluation was done.   Please, continue to take Flomax every night   Follow up with urology as outpatient Dr. Barrera.

## 2024-01-23 NOTE — DISCHARGE NOTE PROVIDER - CARE PROVIDERS DIRECT ADDRESSES
,DirectAddress_Unknown,DirectAddress_Unknown ,DirectAddress_Unknown,[email or web address],DirectAddress_Unknown,DirectAddress_Unknown

## 2024-01-23 NOTE — PROGRESS NOTE ADULT - PROBLEM SELECTOR PLAN 3
Hyponatremia likely secondary to poor PO salt intake.  -Hyponatremia resolved at this moment  -Na on admission 133  -Na currently 138  - Nutrition consult, diet w/ ensure started  - Monitor BMP  - Nephrology consulted - Hyponatremia likely secondary to poor PO salt intake and urinary retention  - Hyponatremia resolved at this moment  - Na on admission 133 -- currently 138  - Nutrition consult, diet w/ ensure started  - Monitor BMP  - Nephrology consulted

## 2024-01-23 NOTE — DISCHARGE NOTE PROVIDER - NSDCMRMEDTOKEN_GEN_ALL_CORE_FT
amLODIPine 5 mg oral tablet: 1 tab(s) orally once a day  glipiZIDE 10 mg oral tablet, extended release: 1 tab(s) orally once a day  metFORMIN 750 mg oral tablet, extended release: 1 tab(s) orally 2 times a day  ramipril 5 mg oral capsule: 1 cap(s) orally once a day  rosuvastatin 5 mg oral tablet: 1 tab(s) orally once a day   amLODIPine 5 mg oral tablet: 1 tab(s) orally once a day  glucometer (per patient&#x27;s insurance): Test blood sugars four times a day. Dispense #1 glucometer.  Januvia 50 mg oral tablet: 1 tab(s) orally once a day  lancets: 1 application subcutaneously 4 times a day  metFORMIN 750 mg oral tablet, extended release: 1 tab(s) orally 2 times a day  ramipril 5 mg oral capsule: 1 cap(s) orally once a day  rosuvastatin 5 mg oral tablet: 1 tab(s) orally once a day  test strips (per patient&#x27;s insurance): 1 application subcutaneously 4 times a day. ** Compatible with patient&#x27;s glucometer **   amLODIPine 5 mg oral tablet: 1 tab(s) orally once a day  glucometer (per patient&#x27;s insurance): Test blood sugars four times a day. Dispense #1 glucometer.  Januvia 50 mg oral tablet: 1 tab(s) orally once a day  lancets: 1 application subcutaneously 4 times a day  metFORMIN 750 mg oral tablet, extended release: 1 tab(s) orally 2 times a day  ramipril 5 mg oral capsule: 1 cap(s) orally once a day  rosuvastatin 5 mg oral tablet: 1 tab(s) orally once a day  tamsulosin 0.4 mg oral capsule: 1 cap(s) orally once a day (at bedtime)  test strips (per patient&#x27;s insurance): 1 application subcutaneously 4 times a day. ** Compatible with patient&#x27;s glucometer **

## 2024-01-23 NOTE — OCCUPATIONAL THERAPY INITIAL EVALUATION ADULT - ADDITIONAL COMMENTS
Pt lives in a senior complex at [place] with his wife with no MILTON and no stairs inside the home. Pt has a handicapped bathroom, both a tub and walk-in, and a raised toilet seat. Pt reports he was independent with all ADLs PTA and independent with ambulation.  [facility]

## 2024-01-23 NOTE — DISCHARGE NOTE PROVIDER - CARE PROVIDER_API CALL
Felix mejía  Phone: (   )    -  Fax: (   )    -  Follow Up Time: 1 week    Vaughn Brown  Neurology  [address] [place] [address]  Phone: [phone]  Fax: [phone]  Follow Up Time: 2 weeks   Vaughn Brown  Neurology  [address] [place] [address]  Phone: [phone]  Fax: [phone]  Follow Up Time: 2 weeks    Lauro Irving  Urology  [address], [address]  [place] [address]  Phone: [phone]  Fax: [phone]  Established Patient  Follow Up Time: 1 week    Felix mejía  Phone: (   )    -  Fax: (   )    -  Follow Up Time: 1 week    Juan Jose Ruiz  Nephrology  [address], [address]  [place] [address]  Phone: [phone]  Fax: [phone]  Follow Up Time: 2 weeks

## 2024-01-23 NOTE — PHYSICAL THERAPY INITIAL EVALUATION ADULT - PERTINENT HX OF CURRENT PROBLEM, REHAB EVAL
Pt is a 84yo male with pmhx of htn, dm, cva presents with weakness from home. per daughter pt had recently GI illness with diarrhea for 3 days that since resolved. daughter reports at this time pt did not eat for 3 days and became generally weak being unable to walk well and care for self. pt consulted PCP dr Amador who referred pt to ed for eval. denies fever,cp, sob, n/v.

## 2024-01-23 NOTE — OCCUPATIONAL THERAPY INITIAL EVALUATION ADULT - DIAGNOSIS, OT EVAL
Pt with generalized weakness and decreased endurance impacting ability to complete ADLs, IADLs, functional mobility/transfers.

## 2024-01-23 NOTE — OCCUPATIONAL THERAPY INITIAL EVALUATION ADULT - RANGE OF MOTION EXAMINATION, LOWER EXTREMITY
Hospitalist Discharge Summary  [facility]    Evy Song MRN# [id number]   YOB: 1933 Age: 85 year old     Date of Admission:  2/5/2019  Date of Discharge:  2/14/2019  Admitting Physician:  Jose Yoo MD  Discharge Physician:  Charles Patino  Discharging Service:  Hospitalist     Primary Provider: Liz Chapin          Discharge Diagnosis:   Left foot cellulitis  Bilateral lower extremity edema and anasarca  Leukocytosis  Lactic acidosis  Diarrhea  Cirrhosis with gastroesophageal varices  Cognitive impairment  Obstructive sleep apnea  Chronic hypoxic respiratory failure  Pulmonary fibrosis  Restless leg syndrome             Discharge Disposition:   Discharged to rehabilitation facility           Allergies:   Allergies   Allergen Reactions     Monosodium Glutamate Cough and Shortness Of Breath     Timolol Rash     Bee Venom      hives,anaphylaxis     Benadryl [Diphenhydramine] Other (See Comments)     Lightheaded     Celecoxib      spasms              Discharge Medications:   Current Discharge Medication List      CONTINUE these medications which have CHANGED    Details   !! rOPINIRole (REQUIP) 0.5 MG tablet Take 1 tablet (0.5 mg) by mouth every 24 hours  Qty: 30 tablet, Refills: 0    Associated Diagnoses: Polyneuropathy associated with underlying disease (H)      !! rOPINIRole (REQUIP) 1 MG tablet Take 1 tablet (1 mg) by mouth At Bedtime  Qty: 30 tablet, Refills: 0    Associated Diagnoses: Polyneuropathy associated with underlying disease (H)      spironolactone (ALDACTONE) 50 MG tablet Take 1 tablet (50 mg) by mouth 2 times daily  Qty: 30 tablet, Refills: 0    Associated Diagnoses: Cirrhosis, non-alcoholic (H)       !! - Potential duplicate medications found. Please discuss with provider.      CONTINUE these medications which have NOT CHANGED    Details   ALBUTEROL 108 (90 BASE) MCG/ACT inhaler INHALE 2 PUFF BY INHALATION ROUTE EVERY 4 - 6 HOURS AS NEEDED  Refills: 12       ARNUITY ELLIPTA 100 MCG/ACT AEPB inhalation powder Take 100 mcg by mouth daily  Refills: 12    Associated Diagnoses: IPF (idiopathic pulmonary fibrosis) (H)      ASPIRIN 81 MG OR TABS 1 tab po QD (Once per day)  Qty: 0, Refills: 0    Associated Diagnoses: Essential hypertension, benign      AZOPT 1 % OP SUSP 1 DROP BOTH EYES BID (am and hs)  RIMA (Brand only)      EPINEPHrine (EPIPEN 2-LALA) 0.3 MG/0.3ML injection Inject 0.3 mLs (0.3 mg) into the muscle once as needed for anaphylaxis  Qty: 2 each, Refills: 1    Associated Diagnoses: Sting of hornets, wasps, and bees as the cause of poisoning and toxic reactions(E905.3)      furosemide (LASIX) 20 MG tablet Take 1 tablet (20 mg) by mouth 2 times daily With breakfast and lunch    Associated Diagnoses: Essential hypertension, benign; Pedal edema; Cirrhosis, non-alcoholic (H)      hydrocortisone (WESTCORT) 0.2 % cream Apply sparingly to affected area as needed up to twice daily  Qty: 45 g, Refills: 0    Associated Diagnoses: Other eczema      LYRICA 75 MG capsule Take 150 mg by mouth nightly as needed       metFORMIN (GLUCOPHAGE-XR) 500 MG 24 hr tablet Take 1 tablet (500 mg) by mouth daily (with dinner)    Associated Diagnoses: 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)      omeprazole (PRILOSEC) 20 MG CR capsule Take 20 mg by mouth daily  Refills: 1    Associated Diagnoses: Gastroesophageal reflux disease without esophagitis      potassium chloride ER (KLOR-CON) 10 MEQ CR tablet Take 1 tablet (10 mEq) by mouth 2 times daily With breakfast and lunch  Qty: 60 tablet, Refills: 0    Associated Diagnoses: Essential hypertension, benign; Pedal edema; Cirrhosis, non-alcoholic (H)      simvastatin (ZOCOR) 20 MG tablet Take 1 tablet (20 mg) by mouth At Bedtime  Qty: 90 tablet, Refills: 1    Associated Diagnoses: Mixed hyperlipidemia; Obesity (BMI 30.0-34.9); 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)      XALATAN 0.005 % OP SOLN 1 drop   "bot eyes at hs  Qty: 0, Refills: 0    Associated Diagnoses: Open-angle glaucoma, unspecified(365.10)      ACE/ARB/ARNI NOT PRESCRIBED, INTENTIONAL, Please choose reason not prescribed, below    Associated Diagnoses: 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 rate 18, height 1.626 m (5' 4\"), weight 86.7 kg (191 lb 3.2 oz), SpO2 (!) 88 %, not currently breastfeeding.   Code status on discharge: Full Code      BASIC PHYSICAL EXAMINATION:  GENERAL: No apparent distress.  CARDIOVASCULAR: Regular rate and rhythm without murmurs.  PULMONARY: Clear to auscultation bilaterally.   GASTROINTESTINAL: Abdomen soft, non-tender.  EXTREMITIES: No edema, pulses intact.  NEUROLOGIC: No focal deficits.            History of Illness:   See detailed admission note for full details.               Procedures excluding imaging which is summarized below:   Please see details in the electronic medical record.           Consultations:   WOUND OSTOMY CONTINENCE NURSE  IP CONSULT  NUTRITION SERVICES ADULT IP CONSULT  WOUND OSTOMY CONTINENCE NURSE  IP CONSULT  PHYSICAL THERAPY ADULT IP CONSULT  CARE TRANSITION RN/SW IP CONSULT  LYMPHEDEMA THERAPY IP CONSULT  PHYSICAL THERAPY ADULT IP CONSULT  OCCUPATIONAL THERAPY ADULT IP CONSULT          Significant Results:   Results for orders placed or performed during the hospital encounter of 02/05/19   Foot XR, G/E 3 views, left    Narrative    LEFT FOOT THREE VIEWS  2/5/2019 8:28 PM     HISTORY: Foot pain/abscess.    COMPARISON: None.      Impression    IMPRESSION: Osteopenia. No evidence of fracture. Postoperative changes  in the ankle with a screw in the distal fibula and old " healed distal  fibular fracture. Diffuse soft tissue swelling over the dorsum of the  forefoot and midfoot with no soft tissue air.    TORY SAUCEDO MD   Abd/pelvis CT,  IV  contrast only TRAUMA / AAA    Narrative    CT ABDOMEN AND PELVIS WITH CONTRAST   2/5/2019 9:31 PM     HISTORY: Lower abdominal pain. Wound infection.    COMPARISON: 5/31/2016 - CT chest.    TECHNIQUE: Following the uneventful administration of 92 mL Isovue-370  intravenous contrast, helical sections were acquired from the top of  the diaphragm through the pubic symphysis. Coronal reconstructions  were generated. Radiation dose for this scan was reduced using  automated exposure control, adjustment of the mA and/or kV according  to the patient's size, or iterative reconstruction technique.    FINDINGS:     Abdomen: Small liver with a nodular outer contour, consistent with  cirrhosis. The spleen, pancreas, adrenal glands and kidneys are  unremarkable. Prior cholecystectomy. Prominent gastroesophageal  varices. No enlarged lymph nodes in the upper abdomen. A moderate  amount of free fluid in the upper abdomen. Atherosclerotic  calcification in the abdominal aorta.    Scan through the lower chest is significant for mild linear opacities  in bilateral lung bases that likely represent scarring and/or  atelectasis.    Pelvis: The small and large bowel are normal in caliber. A few colonic  diverticula are present without evidence of diverticulitis. The  appendix is not visualized. No bowel wall thickening, pneumatosis or  free intraperitoneal gas. The uterus is present. No enlarged lymph  nodes in the pelvis. A moderate amount of free fluid in the pelvis.      Impression    IMPRESSION:   1. Cirrhotic-appearing liver with evidence of portal hypertension  including prominent gastroesophageal varices.  2. A moderate amount of free intraperitoneal fluid.  3. No other cause of acute pain identified in the abdomen or pelvis.    CONRAD MERCHANT MD        Transthoracic Echocardiogram Results:  No results found for this or any previous visit (from the past 4320 hour(s)).             Pending Results:   Unresulted Labs Ordered in the Past 30 Days of this Admission     No orders found from 12/7/2018 to 2/6/2019.                      Discharge Instructions and Follow-Up:   Discharge instructions and follow-up:   Discharge Procedure Orders   General info for SNF   Order Comments: Length of Stay Estimate: Short Term Care: Estimated # of Days <30  Condition at Discharge: Stable  Level of care:skilled   Rehabilitation Potential: Fair  Admission H&P remains valid and up-to-date: Yes  Recent Chemotherapy: N/A  Use Nursing Home Standing Orders: Yes     Mantoux instructions   Order Comments: Give two-step Mantoux (PPD) Per Facility Policy Yes     Follow Up and recommended labs and tests   Order Comments: Follow up with [facility] physician.  The following labs/tests are recommended: BMP in 3 days.  Daily weights, continue lymphedema wraps. Stop metoprolol, increase spironolactone.  Follow up in lymphedema clinic.     Activity - Up with nursing assistance     Order Specific Question Answer Comments   Is discharge order? Yes      Daily weights   Order Comments: Call Provider for weight gain of more than 2 pounds per day or 5 pounds per week.     Full Code     Order Specific Question Answer Comments   Code status determined by: Discussion with patient/legal decision maker      Physical Therapy Adult Consult   Order Comments: Evaluate and treat as clinically indicated.    Reason:  deconditioning     Occupational Therapy Adult Consult   Order Comments: Evaluate and treat as clinically indicated.    Reason:  deconditioning     Oxygen - Nasal cannula   Order Comments: 3 Lpm by nasal cannula to keep O2 sats 92% or greater.     Fall precautions     Advance Diet as Tolerated   Order Comments: Follow this diet upon discharge: Orders Placed This Encounter      Room Service       Snacks/Supplements Adult: Other; strawberry smoothie + 1 pkt beneprotein; Between Meals      Combination Diet 9414-4124 Calories: Moderate Consistent CHO (4-6 CHO units/meal); 3 gm NA Diet     Order Specific Question Answer Comments   Is discharge order? Yes              Hospital Course:   Summary of Stay: Evy Song is a 85 year old female with a history of URBAN and associated cirrhosis, mild REY and no current need for CPAP, pulmonary fibrosis, hypertension, type 2 diabetes, and supraventricular tachycardia admitted on 2/5/2019 with multiple complaints including generalized weakness, poor p.o. intake, diarrhea, increased LE swelling, and left foot erythema.     Evaluation is notable for leukocytosis without fever, significantly elevated lactate at 4.6, suspected left lower foot cellulitis, CT scan showing a moderate amount of ascites with a cirrhotic liver and prominent gastroesophageal varices.     She was initiated on empiric antibiotics in the form of cefazolin and has had a significant improvement in her white count.  She is receiving IV furosemide for prominent bilateral le edema in setting of diuretic noncompliance.     Has been short of breath mostly with exertion but noted to be hypoxic throughout hospitalization although she thinks her breathing is improving.  He has chronic hypoxic respiratory failure and is at baseline 3 L of supplemental oxygen.     At this point she remains admitted for ongoing anasarca with careful diuresis.  She has completed antibiotics and we placed lymphedema wraps.  She required a few doses of IV Lasix.  We will resume her PTA oral Lasix and double her PTA oral spironolactone.  She will need short-term labs as we are going to continue her outpatient potassium supplement as well.  I updated her daughter who is okay with discharge to TCU today.     Problem List:   1. Left foot cellulitis: Resolved.  Near area of recent trauma, definite erythema but minimal warmth, not  impressive from an infectious standpoint, did treat with limited abx regimen-cefazolin.  Completed day 5/5, appreciate WOC input.  Continues to improve.     2. Bilateral LE edema and anasarca:  Likely due to noncompliance with furosemide (she only takes it intermittently as it interferes with her ability to participate in activities) as well as liver disease.  Suspect she also has bowel edema that makes oral furosemide not effective. Tolerating IV furosemide from a renal standpoint.  Lymphedema improving.  Lymphedema consult, now has wraps.  I advised ongoing lymphedema clinic follow-up.  Will increase spironolactone for appropriate combination in cirrhotic patient.     3. Leukocytosis: Resolved.  Was rather prominent in the setting of an unimpressive cellulitis: improved after abx.  She has no abdominal pain to suggest SBP, did not pursue US guided paracentesis at this time-rechecked and now normal.     4. Mildly elevated lactic acid: Likely related to pulmonary edema with mild hypoxia and liver disease. There is no evidence of sepsis.  I see no culprit medications.     5. Diarrhea: No evidence of colitis on CT scanning, enteric panel and C. difficile been ordered-specimen collected and results pending.  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  Of note c diff negative, enteric panel not ordered. Ordered  prn imodium with subsequent improvement     6. Cirrhosis with gastroesophageal varices by CT scanning:  initiated on low-dose nadolol to reduce bleeding risk.  There is no evidence of hepatic encephalopathy and ammonia is within normal limits.  Lactulose would not not be beneficial in this setting especially due to recent diarrhea.  Continue baseline spironolactone.  Did have worsening bili improvement anemia on admission which now appears back to or better than baseline.  Discharging on both  spironolactone and Lasix as noted above.     7. Memory impairment, she's clearly repetitive during my interviews and per daughter this is her baseline, no evidence of delirium.  Stable.     8. Obstructive sleep apnea: Apparently had re-evaluation and was told she does not require CPAP.     9. Pulmonary fibrosis and chronic hypoxic respiratory failure: Without acute exacerbation.  It sounds as though she has had some oxygen at home for the past week or 2.     10. RLS: Increased Requip to 1 mg at night plus 0.5 mg in the afternoon which seems to have helped.    The patient was seen, examined, and counseled on this day. The hospitalization and plan of care was reviewed with the patient and daughter extensively. All questions were addressed and the patient agreed to follow-up as noted above.      Total time spent in face to face contact with the patient and coordinating discharge was:  35 Minutes    Charles Patino DO MPH  [facility] Hospitalist  [address] [place] [address]  Pager: [phone]  02/14/2019     bilateral LE Active ROM was WFL  (within functional limits)

## 2024-01-23 NOTE — PROGRESS NOTE ADULT - PROBLEM SELECTOR PLAN 6
- Hep sub q 5000q8 Blood glucose 274 on admission  - start low dose ISS, bedtime corrective   - monitor BG

## 2024-01-23 NOTE — PROGRESS NOTE ADULT - SUBJECTIVE AND OBJECTIVE BOX
Patient is a 83y old  Male with MHx HTN, T2DM, and a CVA who presented with a chief complaint of failure to thrive, emerald, dehydration (23 Jan 2024 12:17)      INTERVAL HPI/OVERNIGHT EVENTS: Patient seen and examined at bedside. No overnight events occurred. Patient stated he feels better than when he originally presented to the hospital. Patient has no complaints at this time. Patient endorsed he ingested his meals yesterday along with adequate oral hydration. Patient stated his PCP recommended a hospital evaluation due to his recent balance issues. Patient denies previous events of dizziness, presyncope or syncope prior to admission. Denies fevers, dizziness, chills, headache, lightheadedness, chest pain, dyspnea, abdominal pain, n/v/d/c.    MEDICATIONS  (STANDING):  amLODIPine   Tablet 5 milliGRAM(s) Oral daily  atorvastatin 20 milliGRAM(s) Oral at bedtime  dextrose 5%. 1000 milliLiter(s) (50 mL/Hr) IV Continuous <Continuous>  dextrose 5%. 1000 milliLiter(s) (100 mL/Hr) IV Continuous <Continuous>  dextrose 50% Injectable 12.5 Gram(s) IV Push once  dextrose 50% Injectable 25 Gram(s) IV Push once  dextrose 50% Injectable 25 Gram(s) IV Push once  glucagon  Injectable 1 milliGRAM(s) IntraMuscular once  heparin   Injectable 5000 Unit(s) SubCutaneous every 8 hours  influenza  Vaccine (HIGH DOSE) 0.7 milliLiter(s) IntraMuscular once  insulin lispro (ADMELOG) corrective regimen sliding scale   SubCutaneous three times a day before meals  insulin lispro (ADMELOG) corrective regimen sliding scale   SubCutaneous at bedtime  magnesium sulfate  IVPB 2 Gram(s) IV Intermittent once  potassium chloride    Tablet ER 40 milliEquivalent(s) Oral once    MEDICATIONS  (PRN):  acetaminophen     Tablet .. 650 milliGRAM(s) Oral every 6 hours PRN Temp greater or equal to 38C (100.4F), Mild Pain (1 - 3)  dextrose Oral Gel 15 Gram(s) Oral once PRN Blood Glucose LESS THAN 70 milliGRAM(s)/deciliter      Allergies    No Known Allergies    Intolerances        REVIEW OF SYSTEMS:  CONSTITUTIONAL: No fever or chills  HEENT:  No headache, no sore throat  RESPIRATORY: No cough, wheezing, or shortness of breath  CARDIOVASCULAR: No chest pain, palpitations  GASTROINTESTINAL: No abd pain, nausea, vomiting, or diarrhea  GENITOURINARY: No dysuria, frequency, or hematuria  NEUROLOGICAL: no focal weakness or dizziness  MUSCULOSKELETAL: no myalgias     Vital Signs Last 24 Hrs  T(C): 37.1 (23 Jan 2024 11:22), Max: 37.1 (23 Jan 2024 11:22)  T(F): 98.8 (23 Jan 2024 11:22), Max: 98.8 (23 Jan 2024 11:22)  HR: 85 (23 Jan 2024 11:22) (71 - 90)  BP: 125/67 (23 Jan 2024 11:22) (114/68 - 157/74)  BP(mean): --  RR: 18 (23 Jan 2024 11:22) (17 - 18)  SpO2: 98% (23 Jan 2024 11:22) (95% - 98%)    Parameters below as of 23 Jan 2024 11:22  Patient On (Oxygen Delivery Method): room air        PHYSICAL EXAM:  GENERAL: NAD, patient appears stated age   HEENT:  anicteric, moist mucous membranes  CHEST/LUNG:  CTA b/l, no rales, wheezes, or rhonchi  HEART:  RRR, S1, S2  ABDOMEN:  BS+, soft, nontender, nondistended  EXTREMITIES: no edema, cyanosis, or calf tenderness  NERVOUS SYSTEM: answers questions and follows commands appropriately    LABS:                        11.9   3.23  )-----------( 104      ( 23 Jan 2024 08:04 )             33.1     CBC Full  -  ( 23 Jan 2024 08:04 )  WBC Count : 3.23 K/uL  Hemoglobin : 11.9 g/dL  Hematocrit : 33.1 %  Platelet Count - Automated : 104 K/uL  Mean Cell Volume : 90.2 fl  Mean Cell Hemoglobin : 32.4 pg  Mean Cell Hemoglobin Concentration : 36.0 gm/dL  Auto Neutrophil # : 1.67 K/uL  Auto Lymphocyte # : 0.77 K/uL  Auto Monocyte # : 0.66 K/uL  Auto Eosinophil # : 0.12 K/uL  Auto Basophil # : 0.00 K/uL  Auto Neutrophil % : 51.8 %  Auto Lymphocyte % : 23.8 %  Auto Monocyte % : 20.4 %  Auto Eosinophil % : 3.7 %  Auto Basophil % : 0.0 %    23 Jan 2024 08:04    138    |  107    |  24     ----------------------------<  99     3.5     |  24     |  1.10     Ca    8.9        23 Jan 2024 08:04  Phos  2.5       23 Jan 2024 08:04  Mg     1.7       23 Jan 2024 08:04  TPro  6.3    /  Alb  3.2    /  TBili  0.9    /  DBili  x      /  AST  35     /  ALT  30     /  AlkPhos  58     23 Jan 2024 08:04      Urinalysis Basic - ( 23 Jan 2024 08:04 )  Color: x / Appearance: x / SG: x / pH: x  Gluc: 99 mg/dL / Ketone: x  / Bili: x / Urobili: x   Blood: x / Protein: x / Nitrite: x   Leuk Esterase: x / RBC: x / WBC x   Sq Epi: x / Non Sq Epi: x / Bacteria: x      CAPILLARY BLOOD GLUCOSE  POCT Blood Glucose.: 232 mg/dL (23 Jan 2024 11:41)  POCT Blood Glucose.: 106 mg/dL (23 Jan 2024 08:56)  POCT Blood Glucose.: 124 mg/dL (23 Jan 2024 01:20)  POCT Blood Glucose.: 299 mg/dL (22 Jan 2024 13:04)          RADIOLOGY & ADDITIONAL TESTS:    Personally reviewed.     Consultant(s) Notes Reviewed:  [x] YES  [ ] NO     Patient is a 83y old  Male with MHx HTN, T2DM, and a CVA who presented with a chief complaint of failure to thrive, emerald, dehydration (23 Jan 2024 12:17)      INTERVAL HPI/OVERNIGHT EVENTS: Patient seen and examined at bedside. No overnight events occurred. Patient stated he feels better than when he originally presented to the hospital. Patient has no complaints at this time. Patient endorsed he ingested his meals yesterday along with adequate oral hydration. Patient stated his PCP recommended a hospital evaluation due to his recent balance issues. Patient denies previous events of dizziness, presyncope or syncope prior to admission. Denies fevers, dizziness, chills, headache, lightheadedness, chest pain, dyspnea, abdominal pain, n/v/d/c.    MEDICATIONS  (STANDING):  amLODIPine   Tablet 5 milliGRAM(s) Oral daily  atorvastatin 20 milliGRAM(s) Oral at bedtime  dextrose 5%. 1000 milliLiter(s) (50 mL/Hr) IV Continuous <Continuous>  dextrose 5%. 1000 milliLiter(s) (100 mL/Hr) IV Continuous <Continuous>  dextrose 50% Injectable 12.5 Gram(s) IV Push once  dextrose 50% Injectable 25 Gram(s) IV Push once  dextrose 50% Injectable 25 Gram(s) IV Push once  glucagon  Injectable 1 milliGRAM(s) IntraMuscular once  heparin   Injectable 5000 Unit(s) SubCutaneous every 8 hours  influenza  Vaccine (HIGH DOSE) 0.7 milliLiter(s) IntraMuscular once  insulin lispro (ADMELOG) corrective regimen sliding scale   SubCutaneous three times a day before meals  insulin lispro (ADMELOG) corrective regimen sliding scale   SubCutaneous at bedtime  magnesium sulfate  IVPB 2 Gram(s) IV Intermittent once  potassium chloride    Tablet ER 40 milliEquivalent(s) Oral once    MEDICATIONS  (PRN):  acetaminophen     Tablet .. 650 milliGRAM(s) Oral every 6 hours PRN Temp greater or equal to 38C (100.4F), Mild Pain (1 - 3)  dextrose Oral Gel 15 Gram(s) Oral once PRN Blood Glucose LESS THAN 70 milliGRAM(s)/deciliter      Allergies    No Known Allergies    Intolerances        REVIEW OF SYSTEMS:  CONSTITUTIONAL: No fever or chills  HEENT:  No headache  RESPIRATORY: No cough, wheezing, or shortness of breath  CARDIOVASCULAR: No chest pain, palpitations  GASTROINTESTINAL: No abd pain, nausea, vomiting, or diarrhea  GENITOURINARY: No dysuria, frequency, or hematuria  NEUROLOGICAL: no focal weakness or dizziness  MUSCULOSKELETAL: no myalgias     Vital Signs Last 24 Hrs  T(C): 37.1 (23 Jan 2024 11:22), Max: 37.1 (23 Jan 2024 11:22)  T(F): 98.8 (23 Jan 2024 11:22), Max: 98.8 (23 Jan 2024 11:22)  HR: 85 (23 Jan 2024 11:22) (71 - 90)  BP: 125/67 (23 Jan 2024 11:22) (114/68 - 157/74)  BP(mean): --  RR: 18 (23 Jan 2024 11:22) (17 - 18)  SpO2: 98% (23 Jan 2024 11:22) (95% - 98%)    Parameters below as of 23 Jan 2024 11:22  Patient On (Oxygen Delivery Method): room air        PHYSICAL EXAM:  GENERAL: NAD, patient appears stated age   HEENT:  anicteric, moist mucous membranes  CHEST/LUNG:  CTA b/l, no rales, wheezes, or rhonchi  HEART:  RRR, S1, S2  ABDOMEN:  BS+, soft, nontender, nondistended  EXTREMITIES: no edema, cyanosis, or calf tenderness  NERVOUS SYSTEM: answers questions and follows commands appropriately    LABS:                        11.9   3.23  )-----------( 104      ( 23 Jan 2024 08:04 )             33.1     CBC Full  -  ( 23 Jan 2024 08:04 )  WBC Count : 3.23 K/uL  Hemoglobin : 11.9 g/dL  Hematocrit : 33.1 %  Platelet Count - Automated : 104 K/uL  Mean Cell Volume : 90.2 fl  Mean Cell Hemoglobin : 32.4 pg  Mean Cell Hemoglobin Concentration : 36.0 gm/dL  Auto Neutrophil # : 1.67 K/uL  Auto Lymphocyte # : 0.77 K/uL  Auto Monocyte # : 0.66 K/uL  Auto Eosinophil # : 0.12 K/uL  Auto Basophil # : 0.00 K/uL  Auto Neutrophil % : 51.8 %  Auto Lymphocyte % : 23.8 %  Auto Monocyte % : 20.4 %  Auto Eosinophil % : 3.7 %  Auto Basophil % : 0.0 %    23 Jan 2024 08:04    138    |  107    |  24     ----------------------------<  99     3.5     |  24     |  1.10     Ca    8.9        23 Jan 2024 08:04  Phos  2.5       23 Jan 2024 08:04  Mg     1.7       23 Jan 2024 08:04  TPro  6.3    /  Alb  3.2    /  TBili  0.9    /  DBili  x      /  AST  35     /  ALT  30     /  AlkPhos  58     23 Jan 2024 08:04      Urinalysis Basic - ( 23 Jan 2024 08:04 )  Color: x / Appearance: x / SG: x / pH: x  Gluc: 99 mg/dL / Ketone: x  / Bili: x / Urobili: x   Blood: x / Protein: x / Nitrite: x   Leuk Esterase: x / RBC: x / WBC x   Sq Epi: x / Non Sq Epi: x / Bacteria: x      CAPILLARY BLOOD GLUCOSE  POCT Blood Glucose.: 232 mg/dL (23 Jan 2024 11:41)  POCT Blood Glucose.: 106 mg/dL (23 Jan 2024 08:56)  POCT Blood Glucose.: 124 mg/dL (23 Jan 2024 01:20)  POCT Blood Glucose.: 299 mg/dL (22 Jan 2024 13:04)          RADIOLOGY & ADDITIONAL TESTS:    Personally reviewed.     Consultant(s) Notes Reviewed:  [x] YES  [ ] NO     Patient is a 83y old  Male with MHx HTN, T2DM, and a CVA who presented with a chief complaint of diarrhea, poor balance.       INTERVAL HPI/OVERNIGHT EVENTS: Patient seen and examined at bedside. Overnight, pt was not able to urinate and had >1000mL of retained urine drained with a florez catheter. Patient stated he feels better than when he originally presented to the hospital. Patient has no complaints at this time. Patient endorsed he ingested his meals yesterday along with adequate oral hydration. Patient stated his PCP recommended a hospital evaluation due to his recent balance issues. Patient denies previous events of dizziness, presyncope or syncope prior to admission. Denies fevers, dizziness, chills, headache, lightheadedness, chest pain, dyspnea, abdominal pain, n/v/d/c.    MEDICATIONS  (STANDING):  amLODIPine   Tablet 5 milliGRAM(s) Oral daily  atorvastatin 20 milliGRAM(s) Oral at bedtime  dextrose 5%. 1000 milliLiter(s) (50 mL/Hr) IV Continuous <Continuous>  dextrose 5%. 1000 milliLiter(s) (100 mL/Hr) IV Continuous <Continuous>  dextrose 50% Injectable 12.5 Gram(s) IV Push once  dextrose 50% Injectable 25 Gram(s) IV Push once  dextrose 50% Injectable 25 Gram(s) IV Push once  glucagon  Injectable 1 milliGRAM(s) IntraMuscular once  heparin   Injectable 5000 Unit(s) SubCutaneous every 8 hours  influenza  Vaccine (HIGH DOSE) 0.7 milliLiter(s) IntraMuscular once  insulin lispro (ADMELOG) corrective regimen sliding scale   SubCutaneous three times a day before meals  insulin lispro (ADMELOG) corrective regimen sliding scale   SubCutaneous at bedtime  magnesium sulfate  IVPB 2 Gram(s) IV Intermittent once  potassium chloride    Tablet ER 40 milliEquivalent(s) Oral once    MEDICATIONS  (PRN):  acetaminophen     Tablet .. 650 milliGRAM(s) Oral every 6 hours PRN Temp greater or equal to 38C (100.4F), Mild Pain (1 - 3)  dextrose Oral Gel 15 Gram(s) Oral once PRN Blood Glucose LESS THAN 70 milliGRAM(s)/deciliter      Allergies    No Known Allergies    Intolerances        REVIEW OF SYSTEMS:  CONSTITUTIONAL: No fever or chills  HEENT:  No headache  RESPIRATORY: No cough, wheezing, or shortness of breath  CARDIOVASCULAR: No chest pain, palpitations  GASTROINTESTINAL: No abd pain, nausea, vomiting, or diarrhea  GENITOURINARY: No dysuria, frequency, or hematuria  NEUROLOGICAL: no focal weakness or dizziness  MUSCULOSKELETAL: no myalgias     Vital Signs Last 24 Hrs  T(C): 37.1 (23 Jan 2024 11:22), Max: 37.1 (23 Jan 2024 11:22)  T(F): 98.8 (23 Jan 2024 11:22), Max: 98.8 (23 Jan 2024 11:22)  HR: 85 (23 Jan 2024 11:22) (71 - 90)  BP: 125/67 (23 Jan 2024 11:22) (114/68 - 157/74)  BP(mean): --  RR: 18 (23 Jan 2024 11:22) (17 - 18)  SpO2: 98% (23 Jan 2024 11:22) (95% - 98%)    Parameters below as of 23 Jan 2024 11:22  Patient On (Oxygen Delivery Method): room air        PHYSICAL EXAM:  GENERAL: NAD, patient appears stated age   HEENT:  anicteric, moist mucous membranes  CHEST/LUNG:  CTA b/l, no rales, wheezes, or rhonchi  HEART:  RRR, S1, S2  ABDOMEN:  BS+, soft, nontender, nondistended  EXTREMITIES: no edema, cyanosis, or calf tenderness  NERVOUS SYSTEM: answers questions and follows commands appropriately    LABS:                        11.9   3.23  )-----------( 104      ( 23 Jan 2024 08:04 )             33.1     CBC Full  -  ( 23 Jan 2024 08:04 )  WBC Count : 3.23 K/uL  Hemoglobin : 11.9 g/dL  Hematocrit : 33.1 %  Platelet Count - Automated : 104 K/uL  Mean Cell Volume : 90.2 fl  Mean Cell Hemoglobin : 32.4 pg  Mean Cell Hemoglobin Concentration : 36.0 gm/dL  Auto Neutrophil # : 1.67 K/uL  Auto Lymphocyte # : 0.77 K/uL  Auto Monocyte # : 0.66 K/uL  Auto Eosinophil # : 0.12 K/uL  Auto Basophil # : 0.00 K/uL  Auto Neutrophil % : 51.8 %  Auto Lymphocyte % : 23.8 %  Auto Monocyte % : 20.4 %  Auto Eosinophil % : 3.7 %  Auto Basophil % : 0.0 %    23 Jan 2024 08:04    138    |  107    |  24     ----------------------------<  99     3.5     |  24     |  1.10     Ca    8.9        23 Jan 2024 08:04  Phos  2.5       23 Jan 2024 08:04  Mg     1.7       23 Jan 2024 08:04  TPro  6.3    /  Alb  3.2    /  TBili  0.9    /  DBili  x      /  AST  35     /  ALT  30     /  AlkPhos  58     23 Jan 2024 08:04      Urinalysis Basic - ( 23 Jan 2024 08:04 )  Color: x / Appearance: x / SG: x / pH: x  Gluc: 99 mg/dL / Ketone: x  / Bili: x / Urobili: x   Blood: x / Protein: x / Nitrite: x   Leuk Esterase: x / RBC: x / WBC x   Sq Epi: x / Non Sq Epi: x / Bacteria: x      CAPILLARY BLOOD GLUCOSE  POCT Blood Glucose.: 232 mg/dL (23 Jan 2024 11:41)  POCT Blood Glucose.: 106 mg/dL (23 Jan 2024 08:56)  POCT Blood Glucose.: 124 mg/dL (23 Jan 2024 01:20)  POCT Blood Glucose.: 299 mg/dL (22 Jan 2024 13:04)          RADIOLOGY & ADDITIONAL TESTS:    Personally reviewed.     Consultant(s) Notes Reviewed:  [x] YES  [ ] NO     Patient is a 83y old  Male with MHx HTN, T2DM, and a CVA who presented with a chief complaint of diarrhea, poor balance.       INTERVAL HPI/OVERNIGHT EVENTS: Patient seen and examined at bedside. Overnight, pt was not able to urinate and had >1000mL of retained urine drained with a florez catheter. Patient stated he feels better than when he originally presented to the hospital. Patient has no complaints at this time. Patient endorsed he ingested his meals yesterday along with adequate oral hydration. Patient stated his PCP recommended a hospital evaluation due to his recent balance issues. Patient denies previous events of dizziness, presyncope or syncope prior to admission. Denies fevers, dizziness, chills, headache, lightheadedness, chest pain, dyspnea, abdominal pain, n/v/d/c.    MEDICATIONS  (STANDING):  amLODIPine   Tablet 5 milliGRAM(s) Oral daily  atorvastatin 20 milliGRAM(s) Oral at bedtime  dextrose 5%. 1000 milliLiter(s) (50 mL/Hr) IV Continuous <Continuous>  dextrose 5%. 1000 milliLiter(s) (100 mL/Hr) IV Continuous <Continuous>  dextrose 50% Injectable 12.5 Gram(s) IV Push once  dextrose 50% Injectable 25 Gram(s) IV Push once  dextrose 50% Injectable 25 Gram(s) IV Push once  glucagon  Injectable 1 milliGRAM(s) IntraMuscular once  heparin   Injectable 5000 Unit(s) SubCutaneous every 8 hours  influenza  Vaccine (HIGH DOSE) 0.7 milliLiter(s) IntraMuscular once  insulin lispro (ADMELOG) corrective regimen sliding scale   SubCutaneous three times a day before meals  insulin lispro (ADMELOG) corrective regimen sliding scale   SubCutaneous at bedtime  magnesium sulfate  IVPB 2 Gram(s) IV Intermittent once  potassium chloride    Tablet ER 40 milliEquivalent(s) Oral once    MEDICATIONS  (PRN):  acetaminophen     Tablet .. 650 milliGRAM(s) Oral every 6 hours PRN Temp greater or equal to 38C (100.4F), Mild Pain (1 - 3)  dextrose Oral Gel 15 Gram(s) Oral once PRN Blood Glucose LESS THAN 70 milliGRAM(s)/deciliter      Allergies    No Known Allergies    Intolerances        REVIEW OF SYSTEMS:  CONSTITUTIONAL: No fever or chills  HEENT:  No headache  RESPIRATORY: No cough, wheezing, or shortness of breath  CARDIOVASCULAR: No chest pain, palpitations  GASTROINTESTINAL: No abd pain, nausea, vomiting, or diarrhea (resolved)  GENITOURINARY: No dysuria, frequency, or hematuria  NEUROLOGICAL: no focal weakness or dizziness  MUSCULOSKELETAL: no myalgias     Vital Signs Last 24 Hrs  T(C): 37.1 (23 Jan 2024 11:22), Max: 37.1 (23 Jan 2024 11:22)  T(F): 98.8 (23 Jan 2024 11:22), Max: 98.8 (23 Jan 2024 11:22)  HR: 85 (23 Jan 2024 11:22) (71 - 90)  BP: 125/67 (23 Jan 2024 11:22) (114/68 - 157/74)  BP(mean): --  RR: 18 (23 Jan 2024 11:22) (17 - 18)  SpO2: 98% (23 Jan 2024 11:22) (95% - 98%)    Parameters below as of 23 Jan 2024 11:22  Patient On (Oxygen Delivery Method): room air        PHYSICAL EXAM:  GENERAL: NAD, patient appears stated age   HEENT:  anicteric, moist mucous membranes  CHEST/LUNG:  CTA b/l, no rales, wheezes, or rhonchi  HEART:  RRR, S1, S2  ABDOMEN:  BS+, soft, nontender, nondistended  EXTREMITIES: no edema, cyanosis, or calf tenderness  NERVOUS SYSTEM: answers questions and follows commands appropriately    LABS:                        11.9   3.23  )-----------( 104      ( 23 Jan 2024 08:04 )             33.1     CBC Full  -  ( 23 Jan 2024 08:04 )  WBC Count : 3.23 K/uL  Hemoglobin : 11.9 g/dL  Hematocrit : 33.1 %  Platelet Count - Automated : 104 K/uL  Mean Cell Volume : 90.2 fl  Mean Cell Hemoglobin : 32.4 pg  Mean Cell Hemoglobin Concentration : 36.0 gm/dL  Auto Neutrophil # : 1.67 K/uL  Auto Lymphocyte # : 0.77 K/uL  Auto Monocyte # : 0.66 K/uL  Auto Eosinophil # : 0.12 K/uL  Auto Basophil # : 0.00 K/uL  Auto Neutrophil % : 51.8 %  Auto Lymphocyte % : 23.8 %  Auto Monocyte % : 20.4 %  Auto Eosinophil % : 3.7 %  Auto Basophil % : 0.0 %    23 Jan 2024 08:04    138    |  107    |  24     ----------------------------<  99     3.5     |  24     |  1.10     Ca    8.9        23 Jan 2024 08:04  Phos  2.5       23 Jan 2024 08:04  Mg     1.7       23 Jan 2024 08:04  TPro  6.3    /  Alb  3.2    /  TBili  0.9    /  DBili  x      /  AST  35     /  ALT  30     /  AlkPhos  58     23 Jan 2024 08:04      Urinalysis Basic - ( 23 Jan 2024 08:04 )  Color: x / Appearance: x / SG: x / pH: x  Gluc: 99 mg/dL / Ketone: x  / Bili: x / Urobili: x   Blood: x / Protein: x / Nitrite: x   Leuk Esterase: x / RBC: x / WBC x   Sq Epi: x / Non Sq Epi: x / Bacteria: x      CAPILLARY BLOOD GLUCOSE  POCT Blood Glucose.: 232 mg/dL (23 Jan 2024 11:41)  POCT Blood Glucose.: 106 mg/dL (23 Jan 2024 08:56)  POCT Blood Glucose.: 124 mg/dL (23 Jan 2024 01:20)  POCT Blood Glucose.: 299 mg/dL (22 Jan 2024 13:04)          RADIOLOGY & ADDITIONAL TESTS:    Personally reviewed.     Consultant(s) Notes Reviewed:  [x] YES  [ ] NO     Patient is a 83y old  Male with MHx HTN, T2DM, and a CVA who presented with a chief complaint of diarrhea, poor balance.       INTERVAL HPI/OVERNIGHT EVENTS: Patient seen and examined at bedside. Overnight, pt was not able to urinate and had >1000mL of retained urine drained with a florez catheter. Patient stated he feels better than when he originally presented to the hospital. Patient has no complaints at this time. Patient endorsed he ingested his meals yesterday along with adequate oral hydration. Patient stated his PCP recommended a hospital evaluation due to his recent balance issues. Patient denies previous events of dizziness, presyncope or syncope prior to admission. Denies fevers, dizziness, chills, headache, lightheadedness, chest pain, dyspnea, abdominal pain, n/v/d/c.    MEDICATIONS  (STANDING):  amLODIPine   Tablet 5 milliGRAM(s) Oral daily  atorvastatin 20 milliGRAM(s) Oral at bedtime  dextrose 5%. 1000 milliLiter(s) (50 mL/Hr) IV Continuous <Continuous>  dextrose 5%. 1000 milliLiter(s) (100 mL/Hr) IV Continuous <Continuous>  dextrose 50% Injectable 12.5 Gram(s) IV Push once  dextrose 50% Injectable 25 Gram(s) IV Push once  dextrose 50% Injectable 25 Gram(s) IV Push once  glucagon  Injectable 1 milliGRAM(s) IntraMuscular once  heparin   Injectable 5000 Unit(s) SubCutaneous every 8 hours  influenza  Vaccine (HIGH DOSE) 0.7 milliLiter(s) IntraMuscular once  insulin lispro (ADMELOG) corrective regimen sliding scale   SubCutaneous three times a day before meals  insulin lispro (ADMELOG) corrective regimen sliding scale   SubCutaneous at bedtime  magnesium sulfate  IVPB 2 Gram(s) IV Intermittent once  potassium chloride    Tablet ER 40 milliEquivalent(s) Oral once    MEDICATIONS  (PRN):  acetaminophen     Tablet .. 650 milliGRAM(s) Oral every 6 hours PRN Temp greater or equal to 38C (100.4F), Mild Pain (1 - 3)  dextrose Oral Gel 15 Gram(s) Oral once PRN Blood Glucose LESS THAN 70 milliGRAM(s)/deciliter      Allergies    No Known Allergies    Intolerances        REVIEW OF SYSTEMS:  CONSTITUTIONAL: No fever or chills  HEENT:  No headache  RESPIRATORY: No cough, wheezing, or shortness of breath  CARDIOVASCULAR: No chest pain, palpitations  GASTROINTESTINAL: No abd pain, nausea, vomiting, or diarrhea (resolved)  GENITOURINARY: No dysuria, frequency, or hematuria (had inability to urinate overnight and florez placed)  NEUROLOGICAL: no focal weakness or dizziness  MUSCULOSKELETAL: no myalgias     Vital Signs Last 24 Hrs  T(C): 37.1 (23 Jan 2024 11:22), Max: 37.1 (23 Jan 2024 11:22)  T(F): 98.8 (23 Jan 2024 11:22), Max: 98.8 (23 Jan 2024 11:22)  HR: 85 (23 Jan 2024 11:22) (71 - 90)  BP: 125/67 (23 Jan 2024 11:22) (114/68 - 157/74)  BP(mean): --  RR: 18 (23 Jan 2024 11:22) (17 - 18)  SpO2: 98% (23 Jan 2024 11:22) (95% - 98%)    Parameters below as of 23 Jan 2024 11:22  Patient On (Oxygen Delivery Method): room air        PHYSICAL EXAM:  GENERAL: NAD, patient appears stated age   HEENT:  anicteric, moist mucous membranes  CHEST/LUNG:  CTA b/l, no rales, wheezes, or rhonchi  HEART:  RRR, S1, S2  ABDOMEN:  BS+, soft, nontender, nondistended  EXTREMITIES: no edema, cyanosis, or calf tenderness  NERVOUS SYSTEM: answers questions and follows commands appropriately    LABS:                        11.9   3.23  )-----------( 104      ( 23 Jan 2024 08:04 )             33.1     CBC Full  -  ( 23 Jan 2024 08:04 )  WBC Count : 3.23 K/uL  Hemoglobin : 11.9 g/dL  Hematocrit : 33.1 %  Platelet Count - Automated : 104 K/uL  Mean Cell Volume : 90.2 fl  Mean Cell Hemoglobin : 32.4 pg  Mean Cell Hemoglobin Concentration : 36.0 gm/dL  Auto Neutrophil # : 1.67 K/uL  Auto Lymphocyte # : 0.77 K/uL  Auto Monocyte # : 0.66 K/uL  Auto Eosinophil # : 0.12 K/uL  Auto Basophil # : 0.00 K/uL  Auto Neutrophil % : 51.8 %  Auto Lymphocyte % : 23.8 %  Auto Monocyte % : 20.4 %  Auto Eosinophil % : 3.7 %  Auto Basophil % : 0.0 %    23 Jan 2024 08:04    138    |  107    |  24     ----------------------------<  99     3.5     |  24     |  1.10     Ca    8.9        23 Jan 2024 08:04  Phos  2.5       23 Jan 2024 08:04  Mg     1.7       23 Jan 2024 08:04  TPro  6.3    /  Alb  3.2    /  TBili  0.9    /  DBili  x      /  AST  35     /  ALT  30     /  AlkPhos  58     23 Jan 2024 08:04      Urinalysis Basic - ( 23 Jan 2024 08:04 )  Color: x / Appearance: x / SG: x / pH: x  Gluc: 99 mg/dL / Ketone: x  / Bili: x / Urobili: x   Blood: x / Protein: x / Nitrite: x   Leuk Esterase: x / RBC: x / WBC x   Sq Epi: x / Non Sq Epi: x / Bacteria: x      CAPILLARY BLOOD GLUCOSE  POCT Blood Glucose.: 232 mg/dL (23 Jan 2024 11:41)  POCT Blood Glucose.: 106 mg/dL (23 Jan 2024 08:56)  POCT Blood Glucose.: 124 mg/dL (23 Jan 2024 01:20)  POCT Blood Glucose.: 299 mg/dL (22 Jan 2024 13:04)          RADIOLOGY & ADDITIONAL TESTS:    Personally reviewed.     Consultant(s) Notes Reviewed:  [x] YES  [ ] NO

## 2024-01-23 NOTE — DISCHARGE NOTE PROVIDER - PROVIDER TOKENS
FREE:[LAST:[alfonzo],FIRST:[Felix],PHONE:[(   )    -],FAX:[(   )    -],FOLLOWUP:[1 week]],PROVIDER:[TOKEN:[[id number]],FOLLOWUP:[2 weeks]] PROVIDER:[TOKEN:[[id number]],FOLLOWUP:[2 weeks]],PROVIDER:[TOKEN:[[id number]],FOLLOWUP:[1 week],ESTABLISHEDPATIENT:[T]],FREE:[LAST:[alfonzo],FIRST:[Felix],PHONE:[(   )    -],FAX:[(   )    -],FOLLOWUP:[1 week]],PROVIDER:[TOKEN:[[id number]],FOLLOWUP:[2 weeks]]

## 2024-01-23 NOTE — PHYSICAL THERAPY INITIAL EVALUATION ADULT - ADDITIONAL COMMENTS
Pt lives in a condo with no MILTON. Pt lives in a condo with no MILTON. Reports his condo is handicap equipped.

## 2024-01-23 NOTE — PROGRESS NOTE ADULT - PROBLEM SELECTOR PLAN 1
New decrease in ADLS and inability to ambulate, decrease in appetite and PO intake  CT head: Parenchymal volume loss and chronic microvessel ischemic changes are identified. Old lacunar infarcts involving the right basal ganglia region are again seen.  - S/p IVF x 2 in ED  - stop maintenance IVFs  - f/u RVP, GI PCR  - f/u electrolytes  - f/u orthostatics, TTE  - PT/OT eval  - Nutrition consulted, start diet w/ ensure New decrease in ADLS and inability to ambulate, decrease in appetite and PO intake  CT head: Parenchymal volume loss and chronic microvessel ischemic changes are identified. Old lacunar infarcts involving the right basal ganglia region are again seen.  - S/p IVF x 2 in ED  - stop maintenance IVFs  - f/u RVP, GI PCR  - f/u orthostatics, TTE  - PT/OT eval  - Nutrition consulted, start diet w/ ensure  - Neuro consulted, - New decrease in ADLs and inability to ambulate, decrease in appetite and PO intake -- suspect had a gastroenteritis given diarrhea that was self-limited   - CT head: Parenchymal volume loss and chronic microvessel ischemic changes are identified. Old lacunar infarcts involving the right basal ganglia region are again seen.  - S/p IVF x 2 in ED  - stop maintenance IVFs  - f/u RVP (negative), GI PCR (pending)  - f/u orthostatics, TTE  - PT/OT eval  - Nutrition consulted, start diet w/ ensure  - Neuro consulted for reported balance instability if feel need to pursue further imaging - MARCELO likely due to prerenal azotemia from diarrhea at home, but also likely due to acute urinary retention  - Overnight, pt was not able to urinate and had >1000mL of retained urine drained with a florez catheter.  - will start flomax  - unclear why pt was retaining urine; pt does have an enlarged prostate and perhaps decrease in mobility when he was feeling more poorly at home contributed to the retention  - no UTI symptoms and UA without signs of infection  - check CT Abd/P (pt with hx of nephrolithiasis with multiple lithotripsies in the past  - MARCELO likely resolved now  - Creatinine 1.1 (down from 1.9 on admission)  - held home ace inhibitors in setting of MARCELO -- if stable, may restart tomorrow  - S/p IVF x 2 in ED  - stop maintenance IVFs due to adequate hydration status  - Monitor BMP  - Avoid nephrotoxic medications  - Nephrology (Dr. Ruiz), recs appreciated  - urology consulted (Dr. Irving who is pt's outpt urologist, as well)

## 2024-01-23 NOTE — OCCUPATIONAL THERAPY INITIAL EVALUATION ADULT - NSOTDISCHREC_GEN_A_CORE
Pt in agreement with d/c plans. CM aware. Recommend supervision with functional activities which pt states wife will provide./No skilled OT needs

## 2024-01-23 NOTE — PROGRESS NOTE ADULT - ASSESSMENT
83yM pmhx HTN, T2DM, CVA, admitted for failure to thrive and generalized weakness. 83yM PMH of nephrolithiasis (multiple lithotripsies), HTN, T2DM, hx of CVA, p/w generalized weakness, diarrhea, and poor balance a/w MARCELO, diarrhea, acute urinary retention.

## 2024-01-23 NOTE — PROGRESS NOTE ADULT - PROBLEM SELECTOR PLAN 2
MARCELO (on CKD) likely 2/2 pre-renal azotemia in the setting of dehydration, diarrhea, poor PO intake, medications   - Baseline creatinine unknown  -MARCELO resolved  - Creatinine Currently 1.1  - hold home ace inhibitors in setting of MARCELO  - S/p IVF x 2 in ED  - stop maintenance IVFs due to adequate hydration status  - Monitor BMP  - Avoid nephrotoxic medications  - Nephrology consulted - MARCELO likely due to prerenal azotemia from diarrhea at home, but also likely due to acute urinary retention  - Overnight, pt was not able to urinate and had >1000mL of retained urine drained with a florez catheter.  - will start flomax  - MARCELO likely resolved now  - Creatinine 1.1 (down from 1.9 on admission)  - held home ace inhibitors in setting of MARCELO -- if stable, may restart tomorrow  - S/p IVF x 2 in ED  - stop maintenance IVFs due to adequate hydration status  - Monitor BMP  - Avoid nephrotoxic medications  - Nephrology (Dr. Ruiz), recs appreciated  - urology consulted (Dr. Irving who is pt's outpt urologist, as well) - New decrease in ADLs with difficulty ambulating/poor balance, decrease in appetite and PO intake   - suspect pt had a gastroenteritis with dehydration, given diarrhea that was self-limited   - CT head: Parenchymal volume loss and chronic microvessel ischemic changes are identified. Old lacunar infarcts involving the right basal ganglia region are again seen.  - S/p NS 2L in ED  - stop maintenance IVFs and enocurage po intake  - f/u RVP (negative), GI PCR (pending)  - f/u orthostatics, TTE  - PT/OT eval  - Nutrition consulted, start diet w/ ensure  - Neuro (Dr. Brown) consulted for reported balance instability if feel need to pursue further imaging - New decrease in ADLs with difficulty ambulating/poor balance, decrease in appetite and PO intake   - suspect pt had a gastroenteritis with dehydration, given diarrhea that was self-limited   - CT head: Parenchymal volume loss and chronic microvessel ischemic changes are identified. Old lacunar infarcts involving the right basal ganglia region are again seen.  - S/p NS 2L in ED  - stop maintenance IVFs and encourage po intake  - f/u RVP (negative), GI PCR (pending)  - f/u orthostatics, TTE  - PT/OT eval  - Nutrition consulted, start diet w/ ensure  - Neuro (Dr. Brown) consulted for reported balance instability if feel need to pursue further imaging

## 2024-01-23 NOTE — PROGRESS NOTE ADULT - PROBLEM SELECTOR PLAN 4
Patient w/ history of HTN and chronic microvascular disease.  - /71 on admission  -pending reinitiation of ramipril post resolved MARCELO  - continue amlodipine home med w/ parameters  - monitor hemodynamics Patient w/ history of HTN and chronic microvascular disease.  - /71 on admission  - pending reinitiation of ramipril post resolved MARCELO  - continue amlodipine home med w/ parameters  - monitor hemodynamics - pt with mild leukopenia, mild thrombocytopenia and mild anemia  - may have had a viral illness recently with diarrhea, which may have led to the leukopenia/thrombocytopenia  - monitor CBC  - outpt f/up and if persistently low, can see hematologist

## 2024-01-23 NOTE — DISCHARGE NOTE PROVIDER - DISCHARGE DIET
Regular Diet - No restrictions Regular Diet - No restrictions/Consistent Carbohydrate Diabetic Diets/Nutrition Supplements

## 2024-01-23 NOTE — PATIENT PROFILE ADULT - FUNCTIONAL ASSESSMENT - DAILY ACTIVITY SECTION LABEL
Problem: Activity/Exercise Intolerance  Goal: Patient will tolerate activity/exercise  Outcome: Outcome Met, Continue evaluating goal progress toward completion  Intervention: Early Mobilization  Intervention Status  Done  Intervention: Progress activity per Cardiac Rehab protocol  Intervention Status  Done  Intervention: Progressive graded ambulation  Intervention Status  Done  Intervention: Collaborate with nursing to ensure ambulation 4-6 times per day  Intervention Status  Done  Intervention: Upper and lower extremity calisthenics per Cardiac Rehab protocol  Intervention Status  Done  Intervention: Monitor and document progressive activity response  Intervention Status  Done  Intervention: Encourage hourly incentive spirometry, cough and deep breathe  Intervention Status  Done      Problem: Activity Restriction  Goal: Patient will understand activity restrictions  Outcome: Outcome Met, Continue evaluating goal progress toward completion  Intervention: Activity assessment per Cardiac Rehab  Intervention Status  Done         .

## 2024-01-23 NOTE — PATIENT PROFILE ADULT - FALL HARM RISK - HARM RISK INTERVENTIONS

## 2024-01-23 NOTE — CONSULT NOTE ADULT - SUBJECTIVE AND OBJECTIVE BOX
Patient is a 83y old  Male who presents with a chief complaint of failure to thrive, emerald, dehydration (2024 15:54)    HPI:  83yM pmhx HTN, T2DM, CVA, brought to ED from home for generalized weakness. Patient had recent GI illness with diarrhea x 3 days with "countless" episodes of watery diarrhea. Patient states he's had no appetite for the last 3 days and has been unable to eat at all. Patient has had very low fluid intake. Patient is accompanied by daughter who states that for the last week he has been very weak and he has trouble standing up and walking around. Patient's daughter states that the patient tested positive for the flu last week. The patient denies any symptoms of chest pain, SOB, nausea, vomiting.     ED Course    Vitals: 97.8F, 89bpm, 104/71, 99% RA  Labs: Hgb 12.5, wbc 3.22, Na 133, BUN 29, Cr 1.9, glucose 274, trop wnl  CT head: Parenchymal volume loss and chronic microvessel ischemic changes are identified. Old lacunar infarcts involving the right basal ganglia region are again seen.  CXR: no acute findings  EKG: NSR    Received in ED: 1L NS x 2  (2024 15:54)      PAST MEDICAL HISTORY:  Diabetes    Hyperlipidemia    Kidney stone    CVA (cerebral vascular accident)        PAST SURGICAL HISTORY:  H/O lithotripsy    S/P inguinal hernia repair    H/O hand surgery        FAMILY HISTORY:  Family history of prostate cancer (Father)        SOCIAL HISTORY:    Allergies    No Known Allergies    Intolerances      Home Medications:  amLODIPine 5 mg oral tablet: 1 tab(s) orally once a day (2024 16:13)  glipiZIDE 10 mg oral tablet, extended release: 1 tab(s) orally once a day (2024 16:14)  metFORMIN 750 mg oral tablet, extended release: 1 tab(s) orally 2 times a day (2024 16:15)  ramipril 5 mg oral capsule: 1 cap(s) orally once a day (2024 16:13)  rosuvastatin 5 mg oral tablet: 1 tab(s) orally once a day (2024 16:15)    MEDICATIONS  (STANDING):  amLODIPine   Tablet 5 milliGRAM(s) Oral daily  atorvastatin 20 milliGRAM(s) Oral at bedtime  dextrose 5%. 1000 milliLiter(s) (100 mL/Hr) IV Continuous <Continuous>  dextrose 5%. 1000 milliLiter(s) (50 mL/Hr) IV Continuous <Continuous>  dextrose 50% Injectable 12.5 Gram(s) IV Push once  dextrose 50% Injectable 25 Gram(s) IV Push once  dextrose 50% Injectable 25 Gram(s) IV Push once  glucagon  Injectable 1 milliGRAM(s) IntraMuscular once  heparin   Injectable 5000 Unit(s) SubCutaneous every 8 hours  influenza  Vaccine (HIGH DOSE) 0.7 milliLiter(s) IntraMuscular once  insulin lispro (ADMELOG) corrective regimen sliding scale   SubCutaneous three times a day before meals  insulin lispro (ADMELOG) corrective regimen sliding scale   SubCutaneous at bedtime  lactated ringers. 1000 milliLiter(s) (75 mL/Hr) IV Continuous <Continuous>    MEDICATIONS  (PRN):  acetaminophen     Tablet .. 650 milliGRAM(s) Oral every 6 hours PRN Temp greater or equal to 38C (100.4F), Mild Pain (1 - 3)  dextrose Oral Gel 15 Gram(s) Oral once PRN Blood Glucose LESS THAN 70 milliGRAM(s)/deciliter      REVIEW OF SYSTEMS:  General:   Respiratory: No cough, SOB  Cardiovascular: No CP or Palpitations	  Gastrointestinal: No nausea, Vomiting. No diarrhea  Genitourinary: No urinary complaints	  Musculoskeletal: No leg swelling, No new rash or lesions	  Neurological: 	  all other systems negative    T(F): 98 (24 @ 05:25), Max: 98.4 (24 @ 20:50)  HR: 73 (24 @ 05:25) (71 - 90)  BP: 154/70 (24 @ 05:25) (104/71 - 157/74)  RR: 18 (24 @ 05:25) (17 - 18)  SpO2: 95% (24 @ 05:25) (95% - 99%)  Wt(kg): --    PHYSICAL EXAM:  General: NAD  Respiratory: b/l air entry  Cardiovascular: S1 S2  Gastrointestinal: soft  Extremities: edema            133<L>  |  100  |  29<H>  ----------------------------<  274<H>  3.7   |  24  |  1.90<H>    Ca    8.8      2024 13:33  Phos  2.6       Mg     1.9         TPro  6.9  /  Alb  3.6  /  TBili  1.0  /  DBili  x   /  AST  38<H>  /  ALT  30  /  AlkPhos  64                            12.5   3.22  )-----------( 110      ( 2024 13:33 )             35.2       Potassium: 3.7 mmol/L ( @ 13:33)  Blood Urea Nitrogen: 29 mg/dL ( @ 13:33)  Calcium: 8.8 mg/dL ( @ 13:33)  Hemoglobin: 12.5 g/dL ( @ 13:33)      Creatinine, Serum: 1.90 ( @ 13:33)      Urinalysis Basic - ( 2024 06:25 )    Color: Yellow / Appearance: Clear / S.014 / pH: x  Gluc: x / Ketone: Negative mg/dL  / Bili: Negative / Urobili: 0.2 mg/dL   Blood: x / Protein: 100 mg/dL / Nitrite: Negative   Leuk Esterase: Negative / RBC: 0 /HPF / WBC 1 /HPF   Sq Epi: x / Non Sq Epi: x / Bacteria: Few /HPF      LIVER FUNCTIONS - ( 2024 13:33 )  Alb: 3.6 g/dL / Pro: 6.9 g/dL / ALK PHOS: 64 U/L / ALT: 30 U/L / AST: 38 U/L / GGT: x                       I&O's Detail    2024 07:01  -  2024 07:00  --------------------------------------------------------  IN:  Total IN: 0 mL    OUT:    Voided (mL): 1700 mL  Total OUT: 1700 mL    Total NET: -1700 mL               Patient is a 83y old  Male who presents with a chief complaint of failure to thrive, marcelo, dehydration (2024 15:54)    HPI:  83yM pmhx HTN, T2DM, CVA, brought to ED from home for generalized weakness. Patient had recent GI illness with diarrhea x 3 days with "countless" episodes of watery diarrhea. Patient states he's had no appetite for the last 3 days and has been unable to eat at all. Patient has had very low fluid intake. Patient is accompanied by daughter who states that for the last week he has been very weak and he has trouble standing up and walking around. Patient's daughter states that the patient tested positive for the flu last week. The patient denies any symptoms of chest pain, SOB, nausea, vomiting.     ED Course    Vitals: 97.8F, 89bpm, 104/71, 99% RA  Labs: Hgb 12.5, wbc 3.22, Na 133, BUN 29, Cr 1.9, glucose 274, trop wnl  CT head: Parenchymal volume loss and chronic microvessel ischemic changes are identified. Old lacunar infarcts involving the right basal ganglia region are again seen.  CXR: no acute findings  EKG: NSR    Received in ED: 1L NS x 2  (2024 15:54)    Renal consult called for MARCELO. History obtained from chart and patient.       PAST MEDICAL HISTORY:  Diabetes    Hyperlipidemia    Kidney stone    CVA (cerebral vascular accident)        PAST SURGICAL HISTORY:  H/O lithotripsy    S/P inguinal hernia repair    H/O hand surgery        FAMILY HISTORY:  Family history of prostate cancer (Father)        SOCIAL HISTORY: No smoking, + alcohol use     Allergies    No Known Allergies    Intolerances      Home Medications:  amLODIPine 5 mg oral tablet: 1 tab(s) orally once a day (2024 16:13)  glipiZIDE 10 mg oral tablet, extended release: 1 tab(s) orally once a day (2024 16:14)  metFORMIN 750 mg oral tablet, extended release: 1 tab(s) orally 2 times a day (2024 16:15)  ramipril 5 mg oral capsule: 1 cap(s) orally once a day (2024 16:13)  rosuvastatin 5 mg oral tablet: 1 tab(s) orally once a day (2024 16:15)    MEDICATIONS  (STANDING):  amLODIPine   Tablet 5 milliGRAM(s) Oral daily  atorvastatin 20 milliGRAM(s) Oral at bedtime  dextrose 5%. 1000 milliLiter(s) (100 mL/Hr) IV Continuous <Continuous>  dextrose 5%. 1000 milliLiter(s) (50 mL/Hr) IV Continuous <Continuous>  dextrose 50% Injectable 12.5 Gram(s) IV Push once  dextrose 50% Injectable 25 Gram(s) IV Push once  dextrose 50% Injectable 25 Gram(s) IV Push once  glucagon  Injectable 1 milliGRAM(s) IntraMuscular once  heparin   Injectable 5000 Unit(s) SubCutaneous every 8 hours  influenza  Vaccine (HIGH DOSE) 0.7 milliLiter(s) IntraMuscular once  insulin lispro (ADMELOG) corrective regimen sliding scale   SubCutaneous three times a day before meals  insulin lispro (ADMELOG) corrective regimen sliding scale   SubCutaneous at bedtime  lactated ringers. 1000 milliLiter(s) (75 mL/Hr) IV Continuous <Continuous>    MEDICATIONS  (PRN):  acetaminophen     Tablet .. 650 milliGRAM(s) Oral every 6 hours PRN Temp greater or equal to 38C (100.4F), Mild Pain (1 - 3)  dextrose Oral Gel 15 Gram(s) Oral once PRN Blood Glucose LESS THAN 70 milliGRAM(s)/deciliter      REVIEW OF SYSTEMS:  General: no distress  Respiratory: No cough, SOB  Cardiovascular: No CP or Palpitations	  Gastrointestinal: No nausea, Vomiting. No diarrhea  Genitourinary: No urinary complaints	  Musculoskeletal: No new rash or lesions		  all other systems negative    T(F): 98 (24 @ 05:25), Max: 98.4 (24 @ 20:50)  HR: 73 (24 @ 05:25) (71 - 90)  BP: 154/70 (24 @ 05:25) (104/71 - 157/74)  RR: 18 (24 @ 05:25) (17 - 18)  SpO2: 95% (24 @ 05:25) (95% - 99%)  Wt(kg): --    PHYSICAL EXAM:  General: NAD  Respiratory: b/l air entry  Cardiovascular: S1 S2  Gastrointestinal: soft  Extremities: no edema            133<L>  |  100  |  29<H>  ----------------------------<  274<H>  3.7   |  24  |  1.90<H>    Ca    8.8      2024 13:33  Phos  2.6       Mg     1.9         TPro  6.9  /  Alb  3.6  /  TBili  1.0  /  DBili  x   /  AST  38<H>  /  ALT  30  /  AlkPhos  64                            12.5   3.22  )-----------( 110      ( 2024 13:33 )             35.2       Potassium: 3.7 mmol/L ( @ 13:33)  Blood Urea Nitrogen: 29 mg/dL ( @ 13:33)  Calcium: 8.8 mg/dL ( @ 13:33)  Hemoglobin: 12.5 g/dL ( @ 13:33)      Creatinine, Serum: 1.90 ( @ 13:33)      Urinalysis Basic - ( 2024 06:25 )    Color: Yellow / Appearance: Clear / S.014 / pH: x  Gluc: x / Ketone: Negative mg/dL  / Bili: Negative / Urobili: 0.2 mg/dL   Blood: x / Protein: 100 mg/dL / Nitrite: Negative   Leuk Esterase: Negative / RBC: 0 /HPF / WBC 1 /HPF   Sq Epi: x / Non Sq Epi: x / Bacteria: Few /HPF      LIVER FUNCTIONS - ( 2024 13:33 )  Alb: 3.6 g/dL / Pro: 6.9 g/dL / ALK PHOS: 64 U/L / ALT: 30 U/L / AST: 38 U/L / GGT: x                 I&O's Detail    2024 07:01  -  2024 07:00  --------------------------------------------------------  IN:  Total IN: 0 mL    OUT:    Voided (mL): 1700 mL  Total OUT: 1700 mL    Total NET: -1700 mL          < from: Xray Chest 1 View- PORTABLE-Urgent (24 @ 14:43) >    ACC: [id number] EXAM:  XR CHEST PORTABLE URGENT 1V   ORDERED BY: JEAN CLAUDE MARTÍNEZ     PROCEDURE DATE:  2024          INTERPRETATION:  TIME OF EXAM: 2024 at 2:23 PM.    CLINICAL INFORMATION: Weakness.    COMPARISON:  2012.    TECHNIQUE:   AP Portable chest x-ray.    INTERPRETATION:    The heart is not enlarged. Calcified thoracic aorta.  The trachea is midline.  The mediastinum and stef appear unremarkable.  The lungs are clear.  No pleural effusion or pneumothorax is seen.  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      IMPRESSION:  Clear lungs    --- End of Report ---            KYLE ROMERO MD; Attending Radiologist  This document has been electronically signed. 2024  3:48PM    < end of copied text >

## 2024-01-24 DIAGNOSIS — N40.1 BENIGN PROSTATIC HYPERPLASIA WITH LOWER URINARY TRACT SYMPTOMS: ICD-10-CM

## 2024-01-24 DIAGNOSIS — D72.819 DECREASED WHITE BLOOD CELL COUNT, UNSPECIFIED: ICD-10-CM

## 2024-01-24 DIAGNOSIS — R26.81 UNSTEADINESS ON FEET: ICD-10-CM

## 2024-01-24 LAB
ALBUMIN SERPL ELPH-MCNC: 3.1 G/DL — LOW (ref 3.3–5)
ALP SERPL-CCNC: 59 U/L — SIGNIFICANT CHANGE UP (ref 40–120)
ALT FLD-CCNC: 32 U/L — SIGNIFICANT CHANGE UP (ref 12–78)
ANION GAP SERPL CALC-SCNC: 5 MMOL/L — SIGNIFICANT CHANGE UP (ref 5–17)
AST SERPL-CCNC: 29 U/L — SIGNIFICANT CHANGE UP (ref 15–37)
BASOPHILS # BLD AUTO: 0.01 K/UL — SIGNIFICANT CHANGE UP (ref 0–0.2)
BASOPHILS NFR BLD AUTO: 0.3 % — SIGNIFICANT CHANGE UP (ref 0–2)
BILIRUB SERPL-MCNC: 0.6 MG/DL — SIGNIFICANT CHANGE UP (ref 0.2–1.2)
BUN SERPL-MCNC: 25 MG/DL — HIGH (ref 7–23)
CALCIUM SERPL-MCNC: 9.1 MG/DL — SIGNIFICANT CHANGE UP (ref 8.5–10.1)
CHLORIDE SERPL-SCNC: 108 MMOL/L — SIGNIFICANT CHANGE UP (ref 96–108)
CO2 SERPL-SCNC: 24 MMOL/L — SIGNIFICANT CHANGE UP (ref 22–31)
CREAT SERPL-MCNC: 1.2 MG/DL — SIGNIFICANT CHANGE UP (ref 0.5–1.3)
EGFR: 60 ML/MIN/1.73M2 — SIGNIFICANT CHANGE UP
EOSINOPHIL # BLD AUTO: 0.13 K/UL — SIGNIFICANT CHANGE UP (ref 0–0.5)
EOSINOPHIL NFR BLD AUTO: 4.4 % — SIGNIFICANT CHANGE UP (ref 0–6)
GLUCOSE SERPL-MCNC: 223 MG/DL — HIGH (ref 70–99)
HCT VFR BLD CALC: 31.7 % — LOW (ref 39–50)
HGB BLD-MCNC: 11.6 G/DL — LOW (ref 13–17)
IMM GRANULOCYTES NFR BLD AUTO: 0.3 % — SIGNIFICANT CHANGE UP (ref 0–0.9)
LYMPHOCYTES # BLD AUTO: 0.87 K/UL — LOW (ref 1–3.3)
LYMPHOCYTES # BLD AUTO: 29.7 % — SIGNIFICANT CHANGE UP (ref 13–44)
MAGNESIUM SERPL-MCNC: 1.9 MG/DL — SIGNIFICANT CHANGE UP (ref 1.6–2.6)
MCHC RBC-ENTMCNC: 32.5 PG — SIGNIFICANT CHANGE UP (ref 27–34)
MCHC RBC-ENTMCNC: 36.6 GM/DL — HIGH (ref 32–36)
MCV RBC AUTO: 88.8 FL — SIGNIFICANT CHANGE UP (ref 80–100)
MONOCYTES # BLD AUTO: 0.52 K/UL — SIGNIFICANT CHANGE UP (ref 0–0.9)
MONOCYTES NFR BLD AUTO: 17.7 % — HIGH (ref 2–14)
NEUTROPHILS # BLD AUTO: 1.39 K/UL — LOW (ref 1.8–7.4)
NEUTROPHILS NFR BLD AUTO: 47.6 % — SIGNIFICANT CHANGE UP (ref 43–77)
NRBC # BLD: 0 /100 WBCS — SIGNIFICANT CHANGE UP (ref 0–0)
PHOSPHATE SERPL-MCNC: 2.7 MG/DL — SIGNIFICANT CHANGE UP (ref 2.5–4.5)
PLATELET # BLD AUTO: 105 K/UL — LOW (ref 150–400)
POTASSIUM SERPL-MCNC: 3.8 MMOL/L — SIGNIFICANT CHANGE UP (ref 3.5–5.3)
POTASSIUM SERPL-SCNC: 3.8 MMOL/L — SIGNIFICANT CHANGE UP (ref 3.5–5.3)
PROT SERPL-MCNC: 6.2 G/DL — SIGNIFICANT CHANGE UP (ref 6–8.3)
RBC # BLD: 3.57 M/UL — LOW (ref 4.2–5.8)
RBC # FLD: 12 % — SIGNIFICANT CHANGE UP (ref 10.3–14.5)
SODIUM SERPL-SCNC: 137 MMOL/L — SIGNIFICANT CHANGE UP (ref 135–145)
WBC # BLD: 2.93 K/UL — LOW (ref 3.8–10.5)
WBC # FLD AUTO: 2.93 K/UL — LOW (ref 3.8–10.5)

## 2024-01-24 PROCEDURE — 99221 1ST HOSP IP/OBS SF/LOW 40: CPT

## 2024-01-24 PROCEDURE — 99233 SBSQ HOSP IP/OBS HIGH 50: CPT | Mod: GC

## 2024-01-24 PROCEDURE — 70551 MRI BRAIN STEM W/O DYE: CPT | Mod: 26

## 2024-01-24 RX ORDER — INSULIN LISPRO 100/ML
VIAL (ML) SUBCUTANEOUS
Refills: 0 | Status: DISCONTINUED | OUTPATIENT
Start: 2024-01-24 | End: 2024-01-26

## 2024-01-24 RX ADMIN — Medication 5: at 12:36

## 2024-01-24 RX ADMIN — TAMSULOSIN HYDROCHLORIDE 0.4 MILLIGRAM(S): 0.4 CAPSULE ORAL at 21:10

## 2024-01-24 RX ADMIN — AMLODIPINE BESYLATE 5 MILLIGRAM(S): 2.5 TABLET ORAL at 04:53

## 2024-01-24 RX ADMIN — Medication 10: at 17:44

## 2024-01-24 RX ADMIN — ATORVASTATIN CALCIUM 20 MILLIGRAM(S): 80 TABLET, FILM COATED ORAL at 21:10

## 2024-01-24 RX ADMIN — HEPARIN SODIUM 5000 UNIT(S): 5000 INJECTION INTRAVENOUS; SUBCUTANEOUS at 21:10

## 2024-01-24 NOTE — PROGRESS NOTE ADULT - PROBLEM SELECTOR PLAN 6
Blood glucose 274 on admission  - start low dose ISS, bedtime corrective   - monitor BG Blood glucose 274 on admission  - low dose ISS, bedtime corrective   - monitor BG

## 2024-01-24 NOTE — CONSULT NOTE ADULT - REASON FOR ADMISSION
failure to thrive, emerald, dehydration

## 2024-01-24 NOTE — CARE COORDINATION ASSESSMENT. - NSDCPLANSERVICES_GEN_ALL_CORE
This CM met at the bedside with the pt. Introduced myself as the CM explained my role and left contact information. The pt verbalized understanding. The pt lives in a private home with his wife and was independent prior to this admission. The pt has no DME in the home, however PT recommended HCPT and RW. Script was sent to [facility] and a RW was delivered to the pt by ISIDRO Squires. The pt presently has a florez cath due to hematuria and CBI as well. The team is not sure if the pt will go home with the florez cath. The pt is in agreement with home care for PT and VN if he goes home with the florez. List of choices were given and the pt chose [facility]. Insurance provisions were discussed and the pt verbalized understanding. The PCP is Dr. Felix Amador @ [phone] and the pharmacy is [facility] in [place]. The pt is +DM and was tasked to the Dm educator. The pt does not have an endocrinologist and uses his PCP to manage his DM2 on po medication. The pt states he has a supportive daughter who helps if needed in the home. The daughter will transport him home once he is medically cleared. The pt sees Dr. Irving urologist in the community and will continue to do so. CM team to remain available for post acute needs./Home Health Services

## 2024-01-24 NOTE — CONSULT NOTE ADULT - SUBJECTIVE AND OBJECTIVE BOX
Patient is a 83y old  Male who presents with a chief complaint of failure to thrive, marcelo, dehydration (24 Jan 2024 13:01)    Type 2 DM DX 30 years ago, no known complications, managed by PCP Dr Felix Amador, current a1c 7.9%, reports was 7.1% last check, sees PCP every 3 months, Rx home glipizide 10mg daily and metformin 1000mg BID. takes medications daily, reviewed MOA and side effects, risk of hypoglycemia. pt doesn't do f/s, no glucometer, recommend checking F/S 3-4x week daily fasting and after largest meal, discussed s/s of hypoglycemia and management w 15/15 rule. reviewed CC diet and carb identificaiton/portion control, prioriziting lean protein and nonstarchy vegetables avoid concentrated sweets. send script for testing supplies, verbal education and handouts provided  diabetes education provided- A1c measure and BG targets  fasting, <180 2 hours postmeal. medication MOA and considerations/side effects, inhospital BGM frequency and insulin administration, s/s of hyperglycemia/hypoglycemia and management, glycemic control and preventing complications, consistent carb diet, balanced plate method, consistent meal planning. sick day management, provider f/u  Hx MARCELO, CVA, HTN    HPI:  83yM pmhx HTN, T2DM, CVA, brought to ED from home for generalized weakness. Patient had recent GI illness with diarrhea x 3 days with "countless" episodes of watery diarrhea. Patient states he's had no appetite for the last 3 days and has been unable to eat at all. Patient has had very low fluid intake. Patient is accompanied by daughter who states that for the last week he has been very weak and he has trouble standing up and walking around. Patient's daughter states that the patient tested positive for the flu last week. The patient denies any symptoms of chest pain, SOB, nausea, vomiting.     ED Course    Vitals: 97.8F, 89bpm, 104/71, 99% RA  Labs: Hgb 12.5, wbc 3.22, Na 133, BUN 29, Cr 1.9, glucose 274, trop wnl  CT head: Parenchymal volume loss and chronic microvessel ischemic changes are identified. Old lacunar infarcts involving the right basal ganglia region are again seen.  CXR: no acute findings  EKG: NSR    Received in ED: 1L NS x 2  (22 Jan 2024 15:54)      PAST MEDICAL & SURGICAL HISTORY:  Diabetes      Hyperlipidemia      Kidney stone      CVA (cerebral vascular accident)      H/O lithotripsy      S/P inguinal hernia repair      H/O hand surgery      Allergies    No Known Allergies    Intolerances        MEDICATIONS  (STANDING):  amLODIPine   Tablet 5 milliGRAM(s) Oral daily  atorvastatin 20 milliGRAM(s) Oral at bedtime  dextrose 5%. 1000 milliLiter(s) (50 mL/Hr) IV Continuous <Continuous>  dextrose 5%. 1000 milliLiter(s) (100 mL/Hr) IV Continuous <Continuous>  dextrose 50% Injectable 25 Gram(s) IV Push once  dextrose 50% Injectable 12.5 Gram(s) IV Push once  dextrose 50% Injectable 25 Gram(s) IV Push once  glucagon  Injectable 1 milliGRAM(s) IntraMuscular once  heparin   Injectable 5000 Unit(s) SubCutaneous every 8 hours  influenza  Vaccine (HIGH DOSE) 0.7 milliLiter(s) IntraMuscular once  insulin lispro (ADMELOG) corrective regimen sliding scale   SubCutaneous three times a day before meals  insulin lispro (ADMELOG) corrective regimen sliding scale   SubCutaneous at bedtime  tamsulosin 0.4 milliGRAM(s) Oral at bedtime

## 2024-01-24 NOTE — CARE COORDINATION ASSESSMENT. - NSCAREPROVIDERS_GEN_ALL_CORE_FT
CARE PROVIDERS:  Accepting Physician: Ever Baker  Administration: Yvette Madison  Administration: Missy Baca  Administration: Sukhi Elizabeth  Administration: Abdullahi Kay  Admitting: Ever Baker  Attending: Ever Baker  Case Management: Renay Carbajal  Consultant: Vaughn Brown  Consultant: Weil, Patricia  Consultant: Juan Jose Ruiz  Consultant: Raúl Barrera  Consultant: Ramana Mccrary  Covering Team: Vaughn Brown  ED ACP: Natali Mckeon  ED Attending: Haider Alvarez ED Nurse: Kath Almanza  HIM/Billing & Coding: Stephanie Jordan  Nurse: Chiquita Nayak  Nurse: Kylah Kay  Ordered: Doctor, Unknown  Ordered: ADM, User  Or[facility]  Outpatient Provider: Juan Jose Ruiz  PCA/Nursing Assistant: Lainey Selby  PCA/Nursing Assistant: Roberto Siddiqui  Physical Therapy: David Velasco  Primary Team: Natali Mckeon  Primary Team: Piter Barrera  Primary Team: Malissa Lambert  Primary Team: Enmanuel Greenwood  Primary Team: Nehemiah Freitas  Primary Team: Suni Lopez  Registered Dietitian: Lucinda Bird  Registered Dietitian: Cara Wood  Respiratory Therapy: Criss Piedra  Student: Iveth Cordova  Team: [facility], Team

## 2024-01-24 NOTE — PROGRESS NOTE ADULT - SUBJECTIVE AND OBJECTIVE BOX
Patient is a 83y old  Male who presents with a chief complaint of MARCELO, diarrhea, acute urinary retention (23 Jan 2024 12:31)      INTERVAL HPI/OVERNIGHT EVENTS: Patient seen and examined at bedside. No overnight events occurred. Patient has no complaints at this time. Denies fevers, chills, headache, lightheadedness, chest pain, dyspnea, abdominal pain, n/v/d/c.    MEDICATIONS  (STANDING):  amLODIPine   Tablet 5 milliGRAM(s) Oral daily  atorvastatin 20 milliGRAM(s) Oral at bedtime  dextrose 5%. 1000 milliLiter(s) (50 mL/Hr) IV Continuous <Continuous>  dextrose 5%. 1000 milliLiter(s) (100 mL/Hr) IV Continuous <Continuous>  dextrose 50% Injectable 12.5 Gram(s) IV Push once  dextrose 50% Injectable 25 Gram(s) IV Push once  dextrose 50% Injectable 25 Gram(s) IV Push once  glucagon  Injectable 1 milliGRAM(s) IntraMuscular once  heparin   Injectable 5000 Unit(s) SubCutaneous every 8 hours  influenza  Vaccine (HIGH DOSE) 0.7 milliLiter(s) IntraMuscular once  insulin lispro (ADMELOG) corrective regimen sliding scale   SubCutaneous three times a day before meals  insulin lispro (ADMELOG) corrective regimen sliding scale   SubCutaneous at bedtime  tamsulosin 0.4 milliGRAM(s) Oral at bedtime    MEDICATIONS  (PRN):  acetaminophen     Tablet .. 650 milliGRAM(s) Oral every 6 hours PRN Temp greater or equal to 38C (100.4F), Mild Pain (1 - 3)  dextrose Oral Gel 15 Gram(s) Oral once PRN Blood Glucose LESS THAN 70 milliGRAM(s)/deciliter      Allergies    No Known Allergies    Intolerances        REVIEW OF SYSTEMS:  CONSTITUTIONAL: No fever or chills  HEENT:  No headache, no sore throat  RESPIRATORY: No cough, wheezing, or shortness of breath  CARDIOVASCULAR: No chest pain, palpitations  GASTROINTESTINAL: No abd pain, nausea, vomiting, or diarrhea  GENITOURINARY: No dysuria, frequency, or hematuria  NEUROLOGICAL: no focal weakness or dizziness  MUSCULOSKELETAL: no myalgias     Vital Signs Last 24 Hrs  T(C): 36.3 (24 Jan 2024 04:49), Max: 37.1 (23 Jan 2024 11:22)  T(F): 97.4 (24 Jan 2024 04:49), Max: 98.8 (23 Jan 2024 11:22)  HR: 70 (24 Jan 2024 04:49) (70 - 85)  BP: 148/76 (24 Jan 2024 04:49) (125/67 - 158/66)  BP(mean): --  RR: 18 (24 Jan 2024 04:49) (18 - 18)  SpO2: 95% (24 Jan 2024 04:49) (90% - 98%)    Parameters below as of 24 Jan 2024 04:49  Patient On (Oxygen Delivery Method): room air        PHYSICAL EXAM:  GENERAL: NAD  HEENT:  anicteric, moist mucous membranes  CHEST/LUNG:  CTA b/l, no rales, wheezes, or rhonchi  HEART:  RRR, S1, S2  ABDOMEN:  BS+, soft, nontender, nondistended  EXTREMITIES: no edema, cyanosis, or calf tenderness  NERVOUS SYSTEM: answers questions and follows commands appropriately    LABS:                        11.6   2.93  )-----------( 105      ( 24 Jan 2024 07:16 )             31.7     CBC Full  -  ( 24 Jan 2024 07:16 )  WBC Count : 2.93 K/uL  Hemoglobin : 11.6 g/dL  Hematocrit : 31.7 %  Platelet Count - Automated : 105 K/uL  Mean Cell Volume : 88.8 fl  Mean Cell Hemoglobin : 32.5 pg  Mean Cell Hemoglobin Concentration : 36.6 gm/dL  Auto Neutrophil # : 1.39 K/uL  Auto Lymphocyte # : 0.87 K/uL  Auto Monocyte # : 0.52 K/uL  Auto Eosinophil # : 0.13 K/uL  Auto Basophil # : 0.01 K/uL  Auto Neutrophil % : 47.6 %  Auto Lymphocyte % : 29.7 %  Auto Monocyte % : 17.7 %  Auto Eosinophil % : 4.4 %  Auto Basophil % : 0.3 %    24 Jan 2024 07:16    137    |  108    |  25     ----------------------------<  223    3.8     |  24     |  1.20     Ca    9.1        24 Jan 2024 07:16  Phos  2.7       24 Jan 2024 07:16  Mg     1.9       24 Jan 2024 07:16    TPro  6.2    /  Alb  3.1    /  TBili  0.6    /  DBili  x      /  AST  29     /  ALT  32     /  AlkPhos  59     24 Jan 2024 07:16      Urinalysis Basic - ( 24 Jan 2024 07:16 )    Color: x / Appearance: x / SG: x / pH: x  Gluc: 223 mg/dL / Ketone: x  / Bili: x / Urobili: x   Blood: x / Protein: x / Nitrite: x   Leuk Esterase: x / RBC: x / WBC x   Sq Epi: x / Non Sq Epi: x / Bacteria: x      CAPILLARY BLOOD GLUCOSE      POCT Blood Glucose.: 217 mg/dL (23 Jan 2024 21:38)  POCT Blood Glucose.: 209 mg/dL (23 Jan 2024 20:36)  POCT Blood Glucose.: 225 mg/dL (23 Jan 2024 16:54)  POCT Blood Glucose.: 232 mg/dL (23 Jan 2024 11:41)  POCT Blood Glucose.: 106 mg/dL (23 Jan 2024 08:56)          RADIOLOGY & ADDITIONAL TESTS:    Personally reviewed.     Consultant(s) Notes Reviewed:  [x] YES  [ ] NO

## 2024-01-24 NOTE — PROGRESS NOTE ADULT - ASSESSMENT
MARCELO: Prerenal azotemia, Urinary retention  Urinary retention, BPH  Proteinuria: ? Diabetic nephropathy  Hypertension  h/o Nephrolithiasis (F/u with Dr. Irving as out pt)  Diabetes    Improved renal indices. Off IVF. CT results noted.  follow up for retention. ? TOV as out pt. On flomax. Monitor blood sugar levels. Insulin coverage as needed.   Monitor BP trend. Titrate BP meds as needed. Salt restriction. Will follow electrolytes and renal function trend.

## 2024-01-24 NOTE — PROGRESS NOTE ADULT - PROBLEM SELECTOR PLAN 2
- New decrease in ADLs with difficulty ambulating/poor balance, decrease in appetite and PO intake   - suspect pt had a gastroenteritis with dehydration, given diarrhea that was self-limited   - CT head: Parenchymal volume loss and chronic microvessel ischemic changes are identified. Old lacunar infarcts involving the right basal ganglia region are again seen.  - S/p NS 2L in ED  - stop maintenance IVFs and encourage po intake  - f/u RVP (negative), GI PCR (pending)  - f/u orthostatics,   - TTE 55%-60% Normal right ventricular cavity size and probably normal systolic function.  - PT/OT eval  - Nutrition consulted, start diet w/ ensure  - Neuro (Dr. Brown) consulted for reported balance instability if feel need to pursue further imaging - New decrease in ADLs with difficulty ambulating/poor balance, decrease in appetite and PO intake   - suspect pt had a gastroenteritis with dehydration, given diarrhea that was self-limited   - CT head: Parenchymal volume loss and chronic microvessel ischemic changes are identified. Old lacunar infarcts involving the right basal ganglia region are again seen.  - S/p NS 2L in ED  - stop maintenance IVFs and encourage po intake  - f/u RVP (negative), GI PCR (pending)  - f/u brain mri  - TTE 55%-60% Normal right ventricular cavity size and probably normal systolic function.  - PT/OT eval  - Nutrition consulted, start diet w/ ensure  - Neuro (Dr. Brown) - New decrease in ADLs with difficulty ambulating/poor balance, decrease in appetite and PO intake   - suspect pt had a gastroenteritis with dehydration, given diarrhea that was self-limited   - CT head: Parenchymal volume loss and chronic microvessel ischemic changes are identified. Old lacunar infarcts involving the right basal ganglia region are again seen.  - S/p NS 2L in ED  - encourage po intake  - f/u RVP (negative), GI PCR (pending)  - f/u brain mri  - TTE 55%-60% Normal right ventricular cavity size and probably normal systolic function.  - PT/OT eval  - Nutrition consulted, start diet w/ ensure  - Neuro (Dr. Brown)

## 2024-01-24 NOTE — PROGRESS NOTE ADULT - PROBLEM SELECTOR PLAN 3
- Hyponatremia likely secondary to poor PO salt intake and urinary retention  - Hyponatremia resolved at this moment  - Na on admission 133 -- currently 138  - Nutrition consult, diet w/ ensure started  - Monitor BMP  - Nephrology consulted - Hyponatremia likely secondary to poor PO salt intake and urinary retention  - Hyponatremia resolved at this moment  - Na on admission 133   - Nutrition consult, diet w/ ensure started  - Monitor BMP  - Nephrology consulted  - Resolved

## 2024-01-24 NOTE — CONSULT NOTE ADULT - CONSULT REASON
MARCELO
urinary retention
83y A1C with Estimated Average Glucose Result: 7.2 % (01-23-24 @ 08:04)   diabetes mellitus uncontrolled type 2

## 2024-01-24 NOTE — PROGRESS NOTE ADULT - SUBJECTIVE AND OBJECTIVE BOX
Patient is a 83y old  Male who presents with a chief complaint of Acute renal failure     (24 Jan 2024 11:34)    Patient seen in follow up for MARCELO.        PAST MEDICAL HISTORY:  Diabetes    Hyperlipidemia    Kidney stone    CVA (cerebral vascular accident)      MEDICATIONS  (STANDING):  amLODIPine   Tablet 5 milliGRAM(s) Oral daily  atorvastatin 20 milliGRAM(s) Oral at bedtime  dextrose 5%. 1000 milliLiter(s) (50 mL/Hr) IV Continuous <Continuous>  dextrose 5%. 1000 milliLiter(s) (100 mL/Hr) IV Continuous <Continuous>  dextrose 50% Injectable 12.5 Gram(s) IV Push once  dextrose 50% Injectable 25 Gram(s) IV Push once  dextrose 50% Injectable 25 Gram(s) IV Push once  glucagon  Injectable 1 milliGRAM(s) IntraMuscular once  heparin   Injectable 5000 Unit(s) SubCutaneous every 8 hours  influenza  Vaccine (HIGH DOSE) 0.7 milliLiter(s) IntraMuscular once  insulin lispro (ADMELOG) corrective regimen sliding scale   SubCutaneous three times a day before meals  insulin lispro (ADMELOG) corrective regimen sliding scale   SubCutaneous at bedtime  tamsulosin 0.4 milliGRAM(s) Oral at bedtime    MEDICATIONS  (PRN):  acetaminophen     Tablet .. 650 milliGRAM(s) Oral every 6 hours PRN Temp greater or equal to 38C (100.4F), Mild Pain (1 - 3)  dextrose Oral Gel 15 Gram(s) Oral once PRN Blood Glucose LESS THAN 70 milliGRAM(s)/deciliter    T(C): 36.6 (01-24-24 @ 11:32), Max: 37.1 (01-23-24 @ 11:22)  HR: 71 (01-24-24 @ 11:32) (70 - 90)  BP: 132/72 (01-24-24 @ 11:32) (125/67 - 158/66)  RR: 18 (01-24-24 @ 11:32) (18 - 18)  SpO2: 97% (01-24-24 @ 11:32) (90% - 98%)  Wt(kg): --  I&O's Detail    23 Jan 2024 07:01  -  24 Jan 2024 07:00  --------------------------------------------------------  IN:  Total IN: 0 mL    OUT:    Indwelling Catheter - Urethral (mL): 2125 mL  Total OUT: 2125 mL    Total NET: -2125 mL      24 Jan 2024 07:01  -  24 Jan 2024 13:01  --------------------------------------------------------  IN:  Total IN: 0 mL    OUT:    Indwelling Catheter - Urethral (mL): 500 mL  Total OUT: 500 mL    Total NET: -500 mL          PHYSICAL EXAM:  General: No distress  Respiratory: b/l air entry  Cardiovascular: S1 S2  Gastrointestinal: soft  Extremities: no edema                              11.6   2.93  )-----------( 105      ( 24 Jan 2024 07:16 )             31.7     01-24    137  |  108  |  25<H>  ----------------------------<  223<H>  3.8   |  24  |  1.20    Ca    9.1      24 Jan 2024 07:16  Phos  2.7     01-24  Mg     1.9     01-24    TPro  6.2  /  Alb  3.1<L>  /  TBili  0.6  /  DBili  x   /  AST  29  /  ALT  32  /  AlkPhos  59  01-24        LIVER FUNCTIONS - ( 24 Jan 2024 07:16 )  Alb: 3.1 g/dL / Pro: 6.2 g/dL / ALK PHOS: 59 U/L / ALT: 32 U/L / AST: 29 U/L / GGT: x           Urinalysis Basic - ( 24 Jan 2024 07:16 )    Color: x / Appearance: x / SG: x / pH: x  Gluc: 223 mg/dL / Ketone: x  / Bili: x / Urobili: x   Blood: x / Protein: x / Nitrite: x   Leuk Esterase: x / RBC: x / WBC x   Sq Epi: x / Non Sq Epi: x / Bacteria: x        Sodium, Serum: 137 (01-24 @ 07:16)  Sodium, Serum: 138 (01-23 @ 08:04)  Sodium, Serum: 133 (01-22 @ 13:33)    Creatinine, Serum: 1.20 (01-24 @ 07:16)  Creatinine, Serum: 1.10 (01-23 @ 08:04)  Creatinine, Serum: 1.90 (01-22 @ 13:33)    Potassium, Serum: 3.8 (01-24 @ 07:16)  Potassium, Serum: 3.5 (01-23 @ 08:04)  Potassium, Serum: 3.7 (01-22 @ 13:33)    Hemoglobin: 11.6 (01-24 @ 07:16)  Hemoglobin: 11.9 (01-23 @ 08:04)  Hemoglobin: 12.5 (01-22 @ 13:33)        < from: CT Abdomen and Pelvis No Cont (01.23.24 @ 15:04) >    ACC: [id number] EXAM:  CT ABDOMEN AND PELVIS   ORDERED BY: FRANCINE NEGRETE     PROCEDURE DATE:  01/23/2024          INTERPRETATION:  CLINICAL INFORMATION: Diarrhea. Acute kidney injury.  History of nephrolithiasis.    COMPARISON: CT scan abdomen pelvis 5/24/2017.    CONTRAST/COMPLICATIONS:  IV Contrast: NONE  Oral Contrast: NONE  Complications: None reported at time of study completion    PROCEDURE:  CT of the Abdomen and Pelvis was performed.  Sagittal and coronal reformats were performed.    FINDINGS:    LOWER CHEST:  Minimal dependent bibasilar atelectasis.  Small epicardial lymph node.    Evaluation of the solid organ parenchyma is limited without intravenous   contrast.    LIVER: Within normal limits.  BILE DUCTS: Normal caliber.  GALLBLADDER: Contracted with gallstones.  SPLEEN: Borderline enlarged.  PANCREAS: Within normal limits.  ADRENALS: Within normal limits.  KIDNEYS/URETERS:  There are bilateral nonobstructing intrarenal calcifications, the largest   at the left lower pole measuring up to 5 mm.  No hydroureteronephrosis or obstructing ureteral calculus.    6.8 cm left renal cyst.    BLADDER: Collapsed around Pineda catheter.  REPRODUCTIVE ORGANS: Markedly enlarged prostate.    BOWEL:   No bowel obstruction.  No CT evidence for appendicitis.  PERITONEUM: No ascites.    VESSELS: Atherosclerotic changes.  RETROPERITONEUM/LYMPH NODES: No lymphadenopathy.    ABDOMINAL WALL:  Small fat-containing left inguinal hernia.    BONES: Degenerative changes.    IMPRESSION:    Bilateral nonobstructing intrarenal calculi.    Cholelithiasis.    Prostatomegaly.    --- End of Report ---            MOUSTAPHA ONTIVEROS MD; Attending Radiologist  This document has been electronically signed. Jan 23 2024  4:54PM    < end of copied text >

## 2024-01-24 NOTE — PROGRESS NOTE ADULT - SUBJECTIVE AND OBJECTIVE BOX
neuro cons dict  seen for unsteady gait  ct head0 old cva  would get brain mri  consider adding baby(old infarct)  PT

## 2024-01-24 NOTE — DIETITIAN INITIAL EVALUATION ADULT - PERTINENT LABORATORY DATA
01-24    137  |  108  |  25<H>  ----------------------------<  223<H>  3.8   |  24  |  1.20    Ca    9.1      24 Jan 2024 07:16  Phos  2.7     01-24  Mg     1.9     01-24    TPro  6.2  /  Alb  3.1<L>  /  TBili  0.6  /  DBili  x   /  AST  29  /  ALT  32  /  AlkPhos  59  01-24  POCT Blood Glucose.: 217 mg/dL (01-23-24 @ 21:38)  A1C with Estimated Average Glucose Result: 7.2 % (01-23-24 @ 08:04)

## 2024-01-24 NOTE — PROGRESS NOTE ADULT - PROBLEM SELECTOR PLAN 5
Patient w/ history of HTN and chronic microvascular disease.  - /71 on admission  - pending reinitiation of ramipril post resolved MARCELO  - continue amlodipine home med w/ parameters  - monitor hemodynamics

## 2024-01-24 NOTE — CONSULT NOTE ADULT - PROBLEM SELECTOR RECOMMENDATION 9
Flomax has been started. When pt is ambulating well, and he has received a at least a couple days of flomax, he can be given a voiding trial.
Type 2 A1c 7.9% adm ARF  increase to mod ISS  CC diet and accucheck ACHS  Recommend endocrine-Perlman onconsult  FU appt: TBA  DSC recommendations: return to home regimen and increase glucose monitoring, lifestyle and diet modifications  diabetes education provided as documented above  Diabetes support info and cell # [phone] given   Goal 100-180 mg/dL; 140-180 mg/dL in critical care areas

## 2024-01-24 NOTE — DIETITIAN INITIAL EVALUATION ADULT - PROBLEM SELECTOR PLAN 2
MARCELO (on CKD) likely 2/2 pre-renal azotemia in the setting of dehydration, diarrhea, poor PO intake, medications   - Baseline creatinine unknown  - Creatinine Currently 1.9  - hold home ace inhibitors in setting of MACRELO  - S/p IVF x 2 in ED  - start maintenance IVFs  - Monitor BMP  - Avoid nephrotoxic medications  - Nephrology consulted

## 2024-01-24 NOTE — CONSULT NOTE ADULT - ASSESSMENT
Physical Exam:   Vital Signs Last 24 Hrs  T(C): 36.6 (24 Jan 2024 11:32), Max: 36.6 (24 Jan 2024 11:32)  T(F): 97.9 (24 Jan 2024 11:32), Max: 97.9 (24 Jan 2024 11:32)  HR: 71 (24 Jan 2024 11:32) (70 - 71)  BP: 132/72 (24 Jan 2024 11:32) (132/72 - 158/66)  BP(mean): --  RR: 18 (24 Jan 2024 11:32) (18 - 18)  SpO2: 97% (24 Jan 2024 11:32) (90% - 97%)    Parameters below as of 24 Jan 2024 11:32  Patient On (Oxygen Delivery Method): room air       CAPILLARY BLOOD GLUCOSE      POCT Blood Glucose.: 373 mg/dL (24 Jan 2024 12:32)  POCT Blood Glucose.: 217 mg/dL (23 Jan 2024 21:38)  POCT Blood Glucose.: 209 mg/dL (23 Jan 2024 20:36)  POCT Blood Glucose.: 225 mg/dL (23 Jan 2024 16:54)      Cholesterol, Serum: 113 mg/dL (05.19.21 @ 08:36)     HDL Cholesterol, Serum: 22 mg/dL (05.19.21 @ 08:36)     LDL Cholesterol Calculated: 66 mg/dL (05.19.21 @ 08:36)     DIET: CC  >50%

## 2024-01-24 NOTE — PROGRESS NOTE ADULT - PROBLEM SELECTOR PLAN 1
- MARCELO likely due to prerenal azotemia from diarrhea at home, but also likely due to acute urinary retention  - Overnight, pt was not able to urinate and had >1000mL of retained urine drained with a florez catheter.  - will start flomax  - unclear why pt was retaining urine; pt does have an enlarged prostate and perhaps decrease in mobility when he was feeling more poorly at home contributed to the retention  - no UTI symptoms and UA without signs of infection  - check CT Abd/P (pt with hx of nephrolithiasis with multiple lithotripsies in the past  - MARCELO likely resolved now  - Creatinine 1.1 (down from 1.9 on admission)  - held home ace inhibitors in setting of MARCELO -- if stable, may restart tomorrow  - S/p IVF x 2 in ED  - stop maintenance IVFs due to adequate hydration status  - Monitor BMP  - Avoid nephrotoxic medications  - Nephrology (Dr. Ruiz), recs appreciated  - urology consulted (Dr. Irving who is pt's outpt urologist, as well) - MARCELO likely due to prerenal azotemia from diarrhea at home, but also likely due to acute urinary retention  - Overnight, pt was not able to urinate and had >1000mL of retained urine drained with a florez catheter.  - c/w flomax  - unclear why pt was retaining urine; pt does have an enlarged prostate and perhaps decrease in mobility when he was feeling more poorly at home contributed to the retention  - no UTI symptoms and UA without signs of infection  - check CT Abd/P (pt with hx of nephrolithiasis with multiple lithotripsies in the past  - MARCELO likely resolved now  - Creatinine 1.1 (down from 1.9 on admission)  - held home ace inhibitors in setting of MARCELO -- if stable, may restart tomorrow  - S/p IVF x 2 in ED  - stop maintenance IVFs due to adequate hydration status  - Monitor BMP  - Avoid nephrotoxic medications  - Nephrology (Dr. Ruiz), recs appreciated  - urology consulted (Dr. Irving who is pt's outpt urologist, as well) - MARCELO likely due to prerenal azotemia from diarrhea at home, but also likely due to acute urinary retention  - Overnight, pt was not able to urinate and had >1000mL of retained urine drained with a florez catheter.  - c/w flomax  - unclear why pt was retaining urine; pt does have an enlarged prostate and perhaps decrease in mobility when he was feeling more poorly at home contributed to the retention  - no UTI symptoms and UA without signs of infection  - check CT Abd/P (pt with hx of nephrolithiasis with multiple lithotripsies in the past  - MARCELO likely resolved now  - Creatinine 1.1 (down from 1.9 on admission)  - held home ace inhibitors in setting of MARCELO    - S/p IVF x 2 in ED  - Monitor BMP  - Avoid nephrotoxic medications  - Nephrology (Dr. Ruiz), recs appreciated  - urology consulted (Dr. Irving who is pt's outpt urologist, as well), recommend TOV in a few days

## 2024-01-24 NOTE — PROGRESS NOTE ADULT - ASSESSMENT
83yM PMH of nephrolithiasis (multiple lithotripsies), HTN, T2DM, hx of CVA, p/w generalized weakness, diarrhea, and poor balance a/w MARCELO, diarrhea, acute urinary retention.

## 2024-01-24 NOTE — DIETITIAN INITIAL EVALUATION ADULT - OTHER INFO
83yM PMH of nephrolithiasis (multiple lithotripsies), HTN, T2DM, hx of CVA, p/w generalized weakness, diarrhea, and poor balance a/w MARCELO, diarrhea, acute urinary retention.    Pt A +Ox4 during visit. Regular diet rx, glucerna supplement 8oz po TID. Pt reports tolerating meals well with greatly improved appetite/po intake. Decreased appetite/po intake for past week pta with 0% solids 3 days pta. States appetite has gradually/consistently improved since being in hospital consuming 100% breakfast today. % documented in EMR past 3 days. No report N/V. Diarrhea pta, resolved. Pt states he has not had a BM in a few days. Recommend bowel regimen prn. Encourage po fluids/dietary fiber as tolerated. Hx DM, Hgba1c 7.2%. On metformin, glipizide pta. Hyperglycemia at present. Accuchecks(1/23) 232, 225, 209, 217 with ss insulin covg. Lives with wife and follows regular diet without added sugar. No juice/soda pta, limits sweets. UBW 153lbs. +6lb weight loss over past week. Current weight 146.6lbs(1/24). Pt enjoys glucerna supplement (will rotate flavors). Encourage po intake with emphasis on HBV protein sources at all meals. Recommend consistent carbohydrate diet with hs snack and continue glucerna TID. 83yM PMH of nephrolithiasis (multiple lithotripsies), HTN, T2DM, hx of CVA, p/w generalized weakness, diarrhea, and poor balance a/w MARCELO, diarrhea, acute urinary retention.    Pt A +Ox4 during visit. Regular diet rx, glucerna supplement 8oz po TID. Pt reports tolerating meals well with greatly improved appetite/po intake. Decreased appetite/po intake for past week pta with 0% solids 3 days pta. States appetite has gradually/consistently improved since being in hospital consuming 100% breakfast today. % documented in EMR past 3 days. No report N/V. Diarrhea pta, resolved. Pt states he has not had a BM in a few days? Noted soft BM 1/23 per EMR. Encourage po fluids/dietary fiber as tolerated w/ bowel regimen prn.  Hx DM, Hgba1c 7.2%. On metformin, glipizide pta. Hyperglycemia at present. Accuchecks(1/23) 232, 225, 209, 217 with ss insulin covg. Lives with wife and follows regular diet without added sugar. No juice/soda pta, limits sweets. UBW 153lbs. +6lb weight loss over past week. Current weight 146.6lbs(1/24). Pt enjoys glucerna supplement (will rotate flavors). Encourage po intake with emphasis on HBV protein sources at all meals. Recommend consistent carbohydrate diet with hs snack and continue glucerna TID.

## 2024-01-24 NOTE — DIETITIAN INITIAL EVALUATION ADULT - SIGNS/SYMPTOMS
as evidenced by BS > 200, Hgba1c 7.2%.  evidenced by 6lb(3.9%) wt loss x1 wk,<50% est energy needs > 5days, mild to mod muscle/fat depletion

## 2024-01-24 NOTE — PROGRESS NOTE ADULT - PROBLEM SELECTOR PLAN 4
- pt with mild leukopenia, mild thrombocytopenia and mild anemia  - may have had a viral illness recently with diarrhea, which may have led to the leukopenia/thrombocytopenia  - monitor CBC  - outpt f/up and if persistently low, can see hematologist

## 2024-01-24 NOTE — DIETITIAN INITIAL EVALUATION ADULT - NS FNS DIET ORDER
Diet, Regular:   Supplement Feeding Modality:  Oral  Glucerna Shake Cans or Servings Per Day:  1       Frequency:  Three Times a day (01-23-24 @ 07:21) [Active]

## 2024-01-24 NOTE — CARE COORDINATION ASSESSMENT. - NSPASTMEDSURGHISTORY_GEN_ALL_CORE_FT
PAST MEDICAL & SURGICAL HISTORY:  Kidney stone      Hyperlipidemia      Diabetes      H/O hand surgery      S/P inguinal hernia repair      H/O lithotripsy      CVA (cerebral vascular accident)

## 2024-01-24 NOTE — DIETITIAN INITIAL EVALUATION ADULT - PERTINENT MEDS FT
MEDICATIONS  (STANDING):  amLODIPine   Tablet 5 milliGRAM(s) Oral daily  atorvastatin 20 milliGRAM(s) Oral at bedtime  dextrose 5%. 1000 milliLiter(s) (100 mL/Hr) IV Continuous <Continuous>  dextrose 5%. 1000 milliLiter(s) (50 mL/Hr) IV Continuous <Continuous>  dextrose 50% Injectable 12.5 Gram(s) IV Push once  dextrose 50% Injectable 25 Gram(s) IV Push once  dextrose 50% Injectable 25 Gram(s) IV Push once  glucagon  Injectable 1 milliGRAM(s) IntraMuscular once  heparin   Injectable 5000 Unit(s) SubCutaneous every 8 hours  influenza  Vaccine (HIGH DOSE) 0.7 milliLiter(s) IntraMuscular once  insulin lispro (ADMELOG) corrective regimen sliding scale   SubCutaneous at bedtime  insulin lispro (ADMELOG) corrective regimen sliding scale   SubCutaneous three times a day before meals  tamsulosin 0.4 milliGRAM(s) Oral at bedtime    MEDICATIONS  (PRN):  acetaminophen     Tablet .. 650 milliGRAM(s) Oral every 6 hours PRN Temp greater or equal to 38C (100.4F), Mild Pain (1 - 3)  dextrose Oral Gel 15 Gram(s) Oral once PRN Blood Glucose LESS THAN 70 milliGRAM(s)/deciliter

## 2024-01-24 NOTE — DIETITIAN INITIAL EVALUATION ADULT - PROBLEM SELECTOR PLAN 3
Hyponatremia likely secondary to poor PO salt intake.  - Na on admission 133  - Nutrition consult, diet w/ ensure started  - Monitor BMP  - Nephrology consulted

## 2024-01-24 NOTE — CONSULT NOTE ADULT - SUBJECTIVE AND OBJECTIVE BOX
CHIEF COMPLAINT: retention    HISTORY OF PRESENT ILLNESS: 84 y/o man with hx of bph was admitted with dehydration and diarrhea, which has resolved. He was found to have MARCELO and retention of 1 liter. With florez and hydration, renal fntn has improved.     PAST MEDICAL & SURGICAL HISTORY:  Diabetes      Hyperlipidemia      Kidney stone      CVA (cerebral vascular accident)      H/O lithotripsy      S/P inguinal hernia repair      H/O hand surgery          REVIEW OF SYSTEMS:    CONSTITUTIONAL: + weakness, noc fevers or chills  EYES/ENT: No visual changes;  No vertigo or throat pain   NECK: No pain or stiffness  RESPIRATORY: No cough, wheezing, hemoptysis; No shortness of breath  CARDIOVASCULAR: No chest pain or palpitations  GASTROINTESTINAL: see hpi  GENITOURINARY: No dysuria, frequency or hematuria  NEUROLOGICAL: No numbness or weakness  SKIN: No itching, burning, rashes, or lesions   All other review of systems is negative unless indicated above.    MEDICATIONS  (STANDING):  amLODIPine   Tablet 5 milliGRAM(s) Oral daily  atorvastatin 20 milliGRAM(s) Oral at bedtime  dextrose 5%. 1000 milliLiter(s) (50 mL/Hr) IV Continuous <Continuous>  dextrose 5%. 1000 milliLiter(s) (100 mL/Hr) IV Continuous <Continuous>  dextrose 50% Injectable 12.5 Gram(s) IV Push once  dextrose 50% Injectable 25 Gram(s) IV Push once  dextrose 50% Injectable 25 Gram(s) IV Push once  glucagon  Injectable 1 milliGRAM(s) IntraMuscular once  heparin   Injectable 5000 Unit(s) SubCutaneous every 8 hours  influenza  Vaccine (HIGH DOSE) 0.7 milliLiter(s) IntraMuscular once  insulin lispro (ADMELOG) corrective regimen sliding scale   SubCutaneous three times a day before meals  insulin lispro (ADMELOG) corrective regimen sliding scale   SubCutaneous at bedtime  tamsulosin 0.4 milliGRAM(s) Oral at bedtime    MEDICATIONS  (PRN):  acetaminophen     Tablet .. 650 milliGRAM(s) Oral every 6 hours PRN Temp greater or equal to 38C (100.4F), Mild Pain (1 - 3)  dextrose Oral Gel 15 Gram(s) Oral once PRN Blood Glucose LESS THAN 70 milliGRAM(s)/deciliter      Allergies    No Known Allergies    Intolerances        SOCIAL HISTORY:    FAMILY HISTORY:  Family history of prostate cancer (Father)        Vital Signs Last 24 Hrs  T(C): 36.3 (24 Jan 2024 04:49), Max: 37.1 (23 Jan 2024 11:22)  T(F): 97.4 (24 Jan 2024 04:49), Max: 98.8 (23 Jan 2024 11:22)  HR: 70 (24 Jan 2024 04:49) (70 - 85)  BP: 148/76 (24 Jan 2024 04:49) (125/67 - 158/66)  BP(mean): --  RR: 18 (24 Jan 2024 04:49) (18 - 18)  SpO2: 95% (24 Jan 2024 04:49) (90% - 98%)    Parameters below as of 24 Jan 2024 04:49  Patient On (Oxygen Delivery Method): room air        PHYSICAL EXAM:    Constitutional: NAD, well-developed  HEENT: ANITA, EOMI, Normal Hearing, MMM  Neck: No LAD, No JVD  Back: Normal spine flexure, No CVA tenderness  Respiratory: CTAB   Cardiovascular: S1 and S2, RRR, no M/G/R  Abd: BS+, soft, NT/ND, No CVAT  : Normal phallus,open meatus,bilateral descended testes, indwelling 16 r florez draining clear yellow urine  Extremities: No peripheral edema  Vascular: 2+ peripheral pulses  Neurological: A/O x 3, no focal deficits  Psychiatric: Normal mood, normal affect  Skin: No rashes    LABS:                        11.6   2.93  )-----------( 105      ( 24 Jan 2024 07:16 )             31.7     01-24    137  |  108  |  25<H>  ----------------------------<  223<H>  3.8   |  24  |  1.20    Ca    9.1      24 Jan 2024 07:16  Phos  2.7     01-24  Mg     1.9     01-24    TPro  6.2  /  Alb  3.1<L>  /  TBili  0.6  /  DBili  x   /  AST  29  /  ALT  32  /  AlkPhos  59  01-24      Urinalysis Basic - ( 24 Jan 2024 07:16 )    Color: x / Appearance: x / SG: x / pH: x  Gluc: 223 mg/dL / Ketone: x  / Bili: x / Urobili: x   Blood: x / Protein: x / Nitrite: x   Leuk Esterase: x / RBC: x / WBC x   Sq Epi: x / Non Sq Epi: x / Bacteria: x      Urine Culture:     RADIOLOGY & ADDITIONAL STUDIES:  < from: CT Abdomen and Pelvis No Cont (01.23.24 @ 15:04) >  ACC: [id number] EXAM:  CT ABDOMEN AND PELVIS   ORDERED BY: FRANCINE NEGRETE     PROCEDURE DATE:  01/23/2024          INTERPRETATION:  CLINICAL INFORMATION: Diarrhea. Acute kidney injury.  History of nephrolithiasis.    COMPARISON: CT scan abdomen pelvis 5/24/2017.    CONTRAST/COMPLICATIONS:  IV Contrast: NONE  Oral Contrast: NONE  Complications: None reported at time of study completion    PROCEDURE:  CT of the Abdomen and Pelvis was performed.  Sagittal and coronal reformats were performed.    FINDINGS:    LOWER CHEST:  Minimal dependent bibasilar atelectasis.  Small epicardial lymph node.    Evaluation of the solid organ parenchyma is limited without intravenous   contrast.    LIVER: Within normal limits.  BILE DUCTS: Normal caliber.  GALLBLADDER: Contracted with gallstones.  SPLEEN: Borderline enlarged.  PANCREAS: Within normal limits.  ADRENALS: Within normal limits.  KIDNEYS/URETERS:  There are bilateral nonobstructing intrarenal calcifications, the largest   at the left lower pole measuring up to 5 mm.  No hydroureteronephrosis or obstructing ureteral calculus.    6.8 cm left renal cyst.    BLADDER: Collapsed around Florez catheter.  REPRODUCTIVE ORGANS: Markedly enlarged prostate.    BOWEL:   No bowel obstruction.  No CT evidence for appendicitis.  PERITONEUM: No ascites.    VESSELS: Atherosclerotic changes.  RETROPERITONEUM/LYMPH NODES: No lymphadenopathy.    ABDOMINAL WALL:  Small fat-containing left inguinal hernia.    BONES: Degenerative changes.    IMPRESSION:    Bilateral nonobstructing intrarenal calculi.    Cholelithiasis.    Prostatomegaly.    --- End of Report ---            MOUSTAPHA ONTIVEROS MD; Attending Radiologist  This document has been electronically signed. Jan 23 2024  4:54PM    < end of copied text >

## 2024-01-25 LAB
ALBUMIN SERPL ELPH-MCNC: 3.4 G/DL — SIGNIFICANT CHANGE UP (ref 3.3–5)
ALP SERPL-CCNC: 67 U/L — SIGNIFICANT CHANGE UP (ref 40–120)
ALT FLD-CCNC: 34 U/L — SIGNIFICANT CHANGE UP (ref 12–78)
ANION GAP SERPL CALC-SCNC: 7 MMOL/L — SIGNIFICANT CHANGE UP (ref 5–17)
AST SERPL-CCNC: 31 U/L — SIGNIFICANT CHANGE UP (ref 15–37)
BASOPHILS # BLD AUTO: 0.01 K/UL — SIGNIFICANT CHANGE UP (ref 0–0.2)
BASOPHILS NFR BLD AUTO: 0.2 % — SIGNIFICANT CHANGE UP (ref 0–2)
BILIRUB SERPL-MCNC: 0.7 MG/DL — SIGNIFICANT CHANGE UP (ref 0.2–1.2)
BUN SERPL-MCNC: 25 MG/DL — HIGH (ref 7–23)
CALCIUM SERPL-MCNC: 9.5 MG/DL — SIGNIFICANT CHANGE UP (ref 8.5–10.1)
CHLORIDE SERPL-SCNC: 104 MMOL/L — SIGNIFICANT CHANGE UP (ref 96–108)
CO2 SERPL-SCNC: 26 MMOL/L — SIGNIFICANT CHANGE UP (ref 22–31)
CREAT ?TM UR-MCNC: 60 MG/DL — SIGNIFICANT CHANGE UP
CREAT SERPL-MCNC: 1.2 MG/DL — SIGNIFICANT CHANGE UP (ref 0.5–1.3)
EGFR: 60 ML/MIN/1.73M2 — SIGNIFICANT CHANGE UP
EOSINOPHIL # BLD AUTO: 0.16 K/UL — SIGNIFICANT CHANGE UP (ref 0–0.5)
EOSINOPHIL NFR BLD AUTO: 3.4 % — SIGNIFICANT CHANGE UP (ref 0–6)
GLUCOSE SERPL-MCNC: 175 MG/DL — HIGH (ref 70–99)
HCT VFR BLD CALC: 33.4 % — LOW (ref 39–50)
HGB BLD-MCNC: 11.9 G/DL — LOW (ref 13–17)
IMM GRANULOCYTES NFR BLD AUTO: 0.2 % — SIGNIFICANT CHANGE UP (ref 0–0.9)
LYMPHOCYTES # BLD AUTO: 1.05 K/UL — SIGNIFICANT CHANGE UP (ref 1–3.3)
LYMPHOCYTES # BLD AUTO: 22.5 % — SIGNIFICANT CHANGE UP (ref 13–44)
MAGNESIUM SERPL-MCNC: 2.1 MG/DL — SIGNIFICANT CHANGE UP (ref 1.6–2.6)
MCHC RBC-ENTMCNC: 31.7 PG — SIGNIFICANT CHANGE UP (ref 27–34)
MCHC RBC-ENTMCNC: 35.6 GM/DL — SIGNIFICANT CHANGE UP (ref 32–36)
MCV RBC AUTO: 89.1 FL — SIGNIFICANT CHANGE UP (ref 80–100)
MONOCYTES # BLD AUTO: 0.48 K/UL — SIGNIFICANT CHANGE UP (ref 0–0.9)
MONOCYTES NFR BLD AUTO: 10.3 % — SIGNIFICANT CHANGE UP (ref 2–14)
NEUTROPHILS # BLD AUTO: 2.96 K/UL — SIGNIFICANT CHANGE UP (ref 1.8–7.4)
NEUTROPHILS NFR BLD AUTO: 63.4 % — SIGNIFICANT CHANGE UP (ref 43–77)
NRBC # BLD: 0 /100 WBCS — SIGNIFICANT CHANGE UP (ref 0–0)
PHOSPHATE SERPL-MCNC: 2.8 MG/DL — SIGNIFICANT CHANGE UP (ref 2.5–4.5)
PLATELET # BLD AUTO: 138 K/UL — LOW (ref 150–400)
POTASSIUM SERPL-MCNC: 4.2 MMOL/L — SIGNIFICANT CHANGE UP (ref 3.5–5.3)
POTASSIUM SERPL-SCNC: 4.2 MMOL/L — SIGNIFICANT CHANGE UP (ref 3.5–5.3)
PROT ?TM UR-MCNC: 49 MG/DL — HIGH (ref 0–12)
PROT SERPL-MCNC: 6.7 G/DL — SIGNIFICANT CHANGE UP (ref 6–8.3)
PROT/CREAT UR-RTO: 0.8 RATIO — HIGH (ref 0–0.2)
RBC # BLD: 3.75 M/UL — LOW (ref 4.2–5.8)
RBC # FLD: 12 % — SIGNIFICANT CHANGE UP (ref 10.3–14.5)
SODIUM SERPL-SCNC: 137 MMOL/L — SIGNIFICANT CHANGE UP (ref 135–145)
WBC # BLD: 4.67 K/UL — SIGNIFICANT CHANGE UP (ref 3.8–10.5)
WBC # FLD AUTO: 4.67 K/UL — SIGNIFICANT CHANGE UP (ref 3.8–10.5)

## 2024-01-25 PROCEDURE — 99233 SBSQ HOSP IP/OBS HIGH 50: CPT | Mod: GC

## 2024-01-25 RX ORDER — POLYETHYLENE GLYCOL 3350 17 G/17G
17 POWDER, FOR SOLUTION ORAL DAILY
Refills: 0 | Status: DISCONTINUED | OUTPATIENT
Start: 2024-01-25 | End: 2024-01-26

## 2024-01-25 RX ORDER — SENNA PLUS 8.6 MG/1
1 TABLET ORAL AT BEDTIME
Refills: 0 | Status: DISCONTINUED | OUTPATIENT
Start: 2024-01-25 | End: 2024-01-26

## 2024-01-25 RX ADMIN — AMLODIPINE BESYLATE 5 MILLIGRAM(S): 2.5 TABLET ORAL at 05:40

## 2024-01-25 RX ADMIN — Medication 5 MILLIGRAM(S): at 12:26

## 2024-01-25 RX ADMIN — Medication 2: at 08:42

## 2024-01-25 RX ADMIN — Medication 10: at 12:28

## 2024-01-25 RX ADMIN — SENNA PLUS 1 TABLET(S): 8.6 TABLET ORAL at 21:25

## 2024-01-25 RX ADMIN — Medication 2: at 23:01

## 2024-01-25 RX ADMIN — HEPARIN SODIUM 5000 UNIT(S): 5000 INJECTION INTRAVENOUS; SUBCUTANEOUS at 21:26

## 2024-01-25 RX ADMIN — HEPARIN SODIUM 5000 UNIT(S): 5000 INJECTION INTRAVENOUS; SUBCUTANEOUS at 05:40

## 2024-01-25 RX ADMIN — POLYETHYLENE GLYCOL 3350 17 GRAM(S): 17 POWDER, FOR SOLUTION ORAL at 12:27

## 2024-01-25 RX ADMIN — ATORVASTATIN CALCIUM 20 MILLIGRAM(S): 80 TABLET, FILM COATED ORAL at 21:25

## 2024-01-25 RX ADMIN — TAMSULOSIN HYDROCHLORIDE 0.4 MILLIGRAM(S): 0.4 CAPSULE ORAL at 21:25

## 2024-01-25 RX ADMIN — Medication 4: at 17:32

## 2024-01-25 NOTE — PROGRESS NOTE ADULT - PROBLEM SELECTOR PLAN 4
- pt with mild leukopenia, mild thrombocytopenia and mild anemia  - may have had a viral illness recently with diarrhea, which may have led to the leukopenia/thrombocytopenia  - monitor CBC  - outpt f/up and if persistently low, can see hematologist - pt with mild leukopenia, mild thrombocytopenia and mild anemia  - may have had a viral illness recently with diarrhea, which may have led to the leukopenia/thrombocytopenia  - monitor CBC  - outpt f/up and if persistently low, can see hematologist  - resolved

## 2024-01-25 NOTE — PROGRESS NOTE ADULT - ATTENDING COMMENTS
see below
patient seen at bedside  feeling better  tolerating diet   afebrile  +constipation.     A/P:  acute kidney disease  urinary retention secondary to BPH    - florez was inserted due to retention (1L)    - urology and nephro following.     - cont flomax     - d/w urology and family, plan for TOV tomorrow AM.     unsteady gait     - CT head old CVA    - neurology following    - for MRI brain (neg aside from new infarct)     - cont PT    DM2    - endo NP following     - cont hgba1c 7.9% moderate ISS     leukopenia    - reactive. now improved.     DVt proph: heparin 5000 units q8h .  updated daughter over the phone.   plan for TOV tomorrow AM.
patient seen at bedside  feeling better  tolerating diet   afebrile    A/P:  acute kidney disease  urinary retention secondary to BPH    - florez was inserted due to retention (1L)    - urology and nephro following.     - cont flomax     unsteady gait     - CT head old CVA    - neurology following    - for MRI brain (neg)     - cont PT    DM2    - endo NP following     - cont hgba1c 7.9% moderate ISS     leukopenia    - monitor. suspect reactive.     DVt proph: heparin 5000 units q8h

## 2024-01-25 NOTE — PROGRESS NOTE ADULT - PROBLEM SELECTOR PLAN 2
- New decrease in ADLs with difficulty ambulating/poor balance, decrease in appetite and PO intake   - suspect pt had a gastroenteritis with dehydration, given diarrhea that was self-limited   - CT head: Parenchymal volume loss and chronic microvessel ischemic changes are identified. Old lacunar infarcts involving the right basal ganglia region are again seen.  - S/p NS 2L in ED  - encourage po intake  - f/u RVP (negative), GI PCR (pending)  - brain mri No evidence of a mass or current evidence of acute ischemia. Chronic lacunar infarcts and white matter ischemic changes as described  - TTE 55%-60% Normal right ventricular cavity size and probably normal systolic function.  - PT/OT eval  - Nutrition consulted, start diet w/ ensure  - Neuro (Dr. Brown)

## 2024-01-25 NOTE — PROGRESS NOTE ADULT - SUBJECTIVE AND OBJECTIVE BOX
Patient is a 83y old  Male who presents with a chief complaint of failure to thrive, emerald, dehydration (24 Jan 2024 15:58)      INTERVAL HPI/OVERNIGHT EVENTS: Patient seen and examined at bedside. No overnight events occurred. Patient has no complaints at this time. Denies fevers, chills, headache, lightheadedness, chest pain, dyspnea, abdominal pain, n/v/d/c.    MEDICATIONS  (STANDING):  amLODIPine   Tablet 5 milliGRAM(s) Oral daily  atorvastatin 20 milliGRAM(s) Oral at bedtime  dextrose 5%. 1000 milliLiter(s) (50 mL/Hr) IV Continuous <Continuous>  dextrose 5%. 1000 milliLiter(s) (100 mL/Hr) IV Continuous <Continuous>  dextrose 50% Injectable 12.5 Gram(s) IV Push once  dextrose 50% Injectable 25 Gram(s) IV Push once  dextrose 50% Injectable 25 Gram(s) IV Push once  glucagon  Injectable 1 milliGRAM(s) IntraMuscular once  heparin   Injectable 5000 Unit(s) SubCutaneous every 8 hours  influenza  Vaccine (HIGH DOSE) 0.7 milliLiter(s) IntraMuscular once  insulin lispro (ADMELOG) corrective regimen sliding scale   SubCutaneous at bedtime  insulin lispro (ADMELOG) corrective regimen sliding scale   SubCutaneous three times a day before meals  tamsulosin 0.4 milliGRAM(s) Oral at bedtime    MEDICATIONS  (PRN):  acetaminophen     Tablet .. 650 milliGRAM(s) Oral every 6 hours PRN Temp greater or equal to 38C (100.4F), Mild Pain (1 - 3)  dextrose Oral Gel 15 Gram(s) Oral once PRN Blood Glucose LESS THAN 70 milliGRAM(s)/deciliter      Allergies    No Known Allergies    Intolerances        REVIEW OF SYSTEMS:  CONSTITUTIONAL: No fever or chills  HEENT:  No headache, no sore throat  RESPIRATORY: No cough, wheezing, or shortness of breath  CARDIOVASCULAR: No chest pain, palpitations  GASTROINTESTINAL: No abd pain, nausea, vomiting, or diarrhea  GENITOURINARY: No dysuria, frequency, or hematuria  NEUROLOGICAL: no focal weakness or dizziness  MUSCULOSKELETAL: no myalgias     Vital Signs Last 24 Hrs  T(C): 36.7 (25 Jan 2024 06:03), Max: 36.7 (25 Jan 2024 06:03)  T(F): 98 (25 Jan 2024 06:03), Max: 98 (25 Jan 2024 06:03)  HR: 75 (25 Jan 2024 06:03) (69 - 75)  BP: 142/70 (25 Jan 2024 06:10) (127/62 - 164/65)  BP(mean): --  RR: 18 (25 Jan 2024 06:03) (18 - 18)  SpO2: 93% (25 Jan 2024 06:03) (93% - 98%)    Parameters below as of 25 Jan 2024 06:03  Patient On (Oxygen Delivery Method): room air        PHYSICAL EXAM:  GENERAL: NAD  HEENT:  anicteric, moist mucous membranes  CHEST/LUNG:  CTA b/l, no rales, wheezes, or rhonchi  HEART:  RRR, S1, S2  ABDOMEN:  BS+, soft, nontender, nondistended  EXTREMITIES: no edema, cyanosis, or calf tenderness  NERVOUS SYSTEM: answers questions and follows commands appropriately    LABS:    CBC Full  -  ( 24 Jan 2024 07:16 )  WBC Count : 2.93 K/uL  Hemoglobin : 11.6 g/dL  Hematocrit : 31.7 %  Platelet Count - Automated : 105 K/uL  Mean Cell Volume : 88.8 fl  Mean Cell Hemoglobin : 32.5 pg  Mean Cell Hemoglobin Concentration : 36.6 gm/dL  Auto Neutrophil # : 1.39 K/uL  Auto Lymphocyte # : 0.87 K/uL  Auto Monocyte # : 0.52 K/uL  Auto Eosinophil # : 0.13 K/uL  Auto Basophil # : 0.01 K/uL  Auto Neutrophil % : 47.6 %  Auto Lymphocyte % : 29.7 %  Auto Monocyte % : 17.7 %  Auto Eosinophil % : 4.4 %  Auto Basophil % : 0.3 %      Ca    9.1        24 Jan 2024 07:16        Urinalysis Basic - ( 24 Jan 2024 07:16 )    Color: x / Appearance: x / SG: x / pH: x  Gluc: 223 mg/dL / Ketone: x  / Bili: x / Urobili: x   Blood: x / Protein: x / Nitrite: x   Leuk Esterase: x / RBC: x / WBC x   Sq Epi: x / Non Sq Epi: x / Bacteria: x      CAPILLARY BLOOD GLUCOSE      POCT Blood Glucose.: 198 mg/dL (25 Jan 2024 08:33)  POCT Blood Glucose.: 212 mg/dL (24 Jan 2024 21:40)  POCT Blood Glucose.: 353 mg/dL (24 Jan 2024 16:51)  POCT Blood Glucose.: 373 mg/dL (24 Jan 2024 12:32)          RADIOLOGY & ADDITIONAL TESTS:    Personally reviewed.     Consultant(s) Notes Reviewed:  [x] YES  [ ] NO     Patient is a 83y old  Male who presents with a chief complaint of failure to thrive, emerald, dehydration (24 Jan 2024 15:58)      INTERVAL HPI/OVERNIGHT EVENTS: Patient seen and examined at bedside. No overnight events occurred. Patient has no complaints at this time. Denies fevers, chills, headache, lightheadedness, chest pain, dyspnea, abdominal pain, n/v/d/c.    MEDICATIONS  (STANDING):  amLODIPine   Tablet 5 milliGRAM(s) Oral daily  atorvastatin 20 milliGRAM(s) Oral at bedtime  dextrose 5%. 1000 milliLiter(s) (50 mL/Hr) IV Continuous <Continuous>  dextrose 5%. 1000 milliLiter(s) (100 mL/Hr) IV Continuous <Continuous>  dextrose 50% Injectable 12.5 Gram(s) IV Push once  dextrose 50% Injectable 25 Gram(s) IV Push once  dextrose 50% Injectable 25 Gram(s) IV Push once  glucagon  Injectable 1 milliGRAM(s) IntraMuscular once  heparin   Injectable 5000 Unit(s) SubCutaneous every 8 hours  influenza  Vaccine (HIGH DOSE) 0.7 milliLiter(s) IntraMuscular once  insulin lispro (ADMELOG) corrective regimen sliding scale   SubCutaneous at bedtime  insulin lispro (ADMELOG) corrective regimen sliding scale   SubCutaneous three times a day before meals  tamsulosin 0.4 milliGRAM(s) Oral at bedtime    MEDICATIONS  (PRN):  acetaminophen     Tablet .. 650 milliGRAM(s) Oral every 6 hours PRN Temp greater or equal to 38C (100.4F), Mild Pain (1 - 3)  dextrose Oral Gel 15 Gram(s) Oral once PRN Blood Glucose LESS THAN 70 milliGRAM(s)/deciliter      Allergies    No Known Allergies    Intolerances        REVIEW OF SYSTEMS:  CONSTITUTIONAL: No fever or chills  HEENT:  No headache, no sore throat  RESPIRATORY: No cough, wheezing, or shortness of breath  CARDIOVASCULAR: No chest pain, palpitations  GASTROINTESTINAL: No abd pain, nausea, vomiting, or diarrhea  GENITOURINARY: No dysuria, frequency, or hematuria  NEUROLOGICAL: no focal weakness or dizziness  MUSCULOSKELETAL: no myalgias     Vital Signs Last 24 Hrs  T(C): 36.7 (25 Jan 2024 06:03), Max: 36.7 (25 Jan 2024 06:03)  T(F): 98 (25 Jan 2024 06:03), Max: 98 (25 Jan 2024 06:03)  HR: 75 (25 Jan 2024 06:03) (69 - 75)  BP: 142/70 (25 Jan 2024 06:10) (127/62 - 164/65)  BP(mean): --  RR: 18 (25 Jan 2024 06:03) (18 - 18)  SpO2: 93% (25 Jan 2024 06:03) (93% - 98%)    Parameters below as of 25 Jan 2024 06:03  Patient On (Oxygen Delivery Method): room air        PHYSICAL EXAM:  GENERAL: NAD  HEENT:  anicteric, moist mucous membranes  CHEST/LUNG:  CTA b/l, no rales, wheezes, or rhonchi  HEART:  RRR, S1, S2  ABDOMEN:  BS+, soft, nontender, nondistended  EXTREMITIES: no edema, cyanosis, or calf tenderness, florez in place draining  NERVOUS SYSTEM: answers questions and follows commands appropriately    LABS:    CBC Full  -  ( 24 Jan 2024 07:16 )  WBC Count : 2.93 K/uL  Hemoglobin : 11.6 g/dL  Hematocrit : 31.7 %  Platelet Count - Automated : 105 K/uL  Mean Cell Volume : 88.8 fl  Mean Cell Hemoglobin : 32.5 pg  Mean Cell Hemoglobin Concentration : 36.6 gm/dL  Auto Neutrophil # : 1.39 K/uL  Auto Lymphocyte # : 0.87 K/uL  Auto Monocyte # : 0.52 K/uL  Auto Eosinophil # : 0.13 K/uL  Auto Basophil # : 0.01 K/uL  Auto Neutrophil % : 47.6 %  Auto Lymphocyte % : 29.7 %  Auto Monocyte % : 17.7 %  Auto Eosinophil % : 4.4 %  Auto Basophil % : 0.3 %      Ca    9.1        24 Jan 2024 07:16        Urinalysis Basic - ( 24 Jan 2024 07:16 )    Color: x / Appearance: x / SG: x / pH: x  Gluc: 223 mg/dL / Ketone: x  / Bili: x / Urobili: x   Blood: x / Protein: x / Nitrite: x   Leuk Esterase: x / RBC: x / WBC x   Sq Epi: x / Non Sq Epi: x / Bacteria: x      CAPILLARY BLOOD GLUCOSE      POCT Blood Glucose.: 198 mg/dL (25 Jan 2024 08:33)  POCT Blood Glucose.: 212 mg/dL (24 Jan 2024 21:40)  POCT Blood Glucose.: 353 mg/dL (24 Jan 2024 16:51)  POCT Blood Glucose.: 373 mg/dL (24 Jan 2024 12:32)          RADIOLOGY & ADDITIONAL TESTS:    Personally reviewed.     Consultant(s) Notes Reviewed:  [x] YES  [ ] NO

## 2024-01-25 NOTE — PROGRESS NOTE ADULT - PROBLEM SELECTOR PLAN 1
- MARCELO likely due to prerenal azotemia from diarrhea at home, but also likely due to acute urinary retention  - Overnight, pt was not able to urinate and had >1000mL of retained urine drained with a florez catheter.  - c/w flomax  - unclear why pt was retaining urine; pt does have an enlarged prostate and perhaps decrease in mobility when he was feeling more poorly at home contributed to the retention  - no UTI symptoms and UA without signs of infection  - check CT Abd/P (pt with hx of nephrolithiasis with multiple lithotripsies in the past  - MARCELO likely resolved now  - Creatinine 1.1 (down from 1.9 on admission)  - held home ace inhibitors in setting of MARCELO    - S/p IVF x 2 in ED  - Monitor BMP  - Avoid nephrotoxic medications  - Nephrology (Dr. Ruiz), recs appreciated  - urology consulted (Dr. Irving who is pt's outpt urologist, as well), recommend TOV in a few days - MARCELO likely due to prerenal azotemia from diarrhea at home, but also likely due to acute urinary retention  - Overnight, pt was not able to urinate and had >1000mL of retained urine drained with a florez catheter.  - c/w flomax  - unclear why pt was retaining urine; pt does have an enlarged prostate and perhaps decrease in mobility when he was feeling more poorly at home contributed to the retention  - no UTI symptoms and UA without signs of infection  - MARCELO likely resolved now  - Creatinine 1.1 (down from 1.9 on admission)  - held home ace inhibitors in setting of MARCELO    - S/p IVF x 2 in ED  - Monitor BMP  - Avoid nephrotoxic medications  - Nephrology (Dr. Ruiz), recs appreciated  - urology consulted (Dr. Irving who is pt's outpt urologist, as well), will to TOV 1/26, will continue tamsulosin

## 2024-01-25 NOTE — PROGRESS NOTE ADULT - SUBJECTIVE AND OBJECTIVE BOX
Neurology Follow up note    JOSE PHILLIPS83yMale    HPI:  83yM pmhx HTN, T2DM, CVA, brought to ED from home for generalized weakness. Patient had recent GI illness with diarrhea x 3 days with "countless" episodes of watery diarrhea. Patient states he's had no appetite for the last 3 days and has been unable to eat at all. Patient has had very low fluid intake. Patient is accompanied by daughter who states that for the last week he has been very weak and he has trouble standing up and walking around. Patient's daughter states that the patient tested positive for the flu last week. The patient denies any symptoms of chest pain, SOB, nausea, vomiting.     ED Course    Vitals: 97.8F, 89bpm, 104/71, 99% RA  Labs: Hgb 12.5, wbc 3.22, Na 133, BUN 29, Cr 1.9, glucose 274, trop wnl  CT head: Parenchymal volume loss and chronic microvessel ischemic changes are identified. Old lacunar infarcts involving the right basal ganglia region are again seen.  CXR: no acute findings  EKG: NSR    Received in ED: 1L NS x 2  (22 Jan 2024 15:54)      Interval History -doing better    Patient is seen, chart was reviewed and case was discussed with the treatment team.  Pt is not in any distress.   Lying on bed comfortably.   .    Vital Signs Last 24 Hrs  T(C): 36.4 (25 Jan 2024 11:49), Max: 36.7 (25 Jan 2024 06:03)  T(F): 97.6 (25 Jan 2024 11:49), Max: 98 (25 Jan 2024 06:03)  HR: 74 (25 Jan 2024 11:49) (69 - 75)  BP: 112/69 (25 Jan 2024 11:49) (112/69 - 164/65)  BP(mean): --  RR: 17 (25 Jan 2024 11:49) (17 - 18)  SpO2: 96% (25 Jan 2024 11:49) (93% - 98%)    Parameters below as of 25 Jan 2024 11:49  Patient On (Oxygen Delivery Method): room air            REVIEW OF SYSTEMS:    Constitutional: No fever, weight loss or fatigue  Eyes: No eye pain, visual disturbances, or discharge  ENT:  No difficulty hearing, tinnitus, vertigo; No sinus or throat pain  Neck: No pain or stiffness  Respiratory: No cough, wheezing, chills or hemoptysis  Cardiovascular: No chest pain, palpitations, shortness of breath, dizziness or leg swelling  Gastrointestinal: No abdominal or epigastric pain. No nausea, vomiting or hemateme  Genitourinary: No dysuria, , hematuria   Neurological: No headaches, memory loss, loss of strength, numbness or tremors  Psychiatric: No depression, anxiety, mood swings or difficulty sleeping  Musculoskeletal: No joint pain or swelling; No muscle, back or extremity pain  Skin: No itching, burning, rashes or lesions   Lymph Nodes: No enlarged glands  Endocrine: No heat or cold intolerance; No hair loss,  Allergy and Immunologic: No hives or eczema    On Neurological Examination:    Mental Status - Pt is alert, awake, oriented X3. Follows commands well and able to answer questions appropriately.Mood and affect  normal    Speech -  Normal.    Cranial Nerves - Pupils 3 mm equal and reactive to light, extraocular eye movements intact. Pt has no visual field deficit.  Pt has no facial asymmetry. Facial sensation is intact.Tongue - is in midline.    Muscle tone - is normal      Motor Exam - 5/5 of UE  LE 4+/5  , No drift. No shaking or tremors.    Sensory Exam - Pt withdraws all extremities equally on stimulation. No asymmetry seen. No complaints of tingling, numbness.    Gait - Able to stand and walk unassisted.          coordination:    Finger to nose: normaL    .    Deep tendon Reflexes - 2 plus all over.            Neck Supple -  Yes.     MEDICATIONS    acetaminophen     Tablet .. 650 milliGRAM(s) Oral every 6 hours PRN  amLODIPine   Tablet 5 milliGRAM(s) Oral daily  atorvastatin 20 milliGRAM(s) Oral at bedtime  dextrose 5%. 1000 milliLiter(s) IV Continuous <Continuous>  dextrose 5%. 1000 milliLiter(s) IV Continuous <Continuous>  dextrose 50% Injectable 12.5 Gram(s) IV Push once  dextrose 50% Injectable 25 Gram(s) IV Push once  dextrose 50% Injectable 25 Gram(s) IV Push once  dextrose Oral Gel 15 Gram(s) Oral once PRN  glucagon  Injectable 1 milliGRAM(s) IntraMuscular once  heparin   Injectable 5000 Unit(s) SubCutaneous every 8 hours  influenza  Vaccine (HIGH DOSE) 0.7 milliLiter(s) IntraMuscular once  insulin lispro (ADMELOG) corrective regimen sliding scale   SubCutaneous at bedtime  insulin lispro (ADMELOG) corrective regimen sliding scale   SubCutaneous three times a day before meals  polyethylene glycol 3350 17 Gram(s) Oral daily  senna 1 Tablet(s) Oral at bedtime  tamsulosin 0.4 milliGRAM(s) Oral at bedtime      Allergies    No Known Allergies    Intolerances        LABS:  CBC Full  -  ( 25 Jan 2024 09:19 )  WBC Count : 4.67 K/uL  RBC Count : 3.75 M/uL  Hemoglobin : 11.9 g/dL  Hematocrit : 33.4 %  Platelet Count - Automated : 138 K/uL  Mean Cell Volume : 89.1 fl  Mean Cell Hemoglobin : 31.7 pg  Mean Cell Hemoglobin Concentration : 35.6 gm/dL  Auto Neutrophil # : 2.96 K/uL  Auto Lymphocyte # : 1.05 K/uL  Auto Monocyte # : 0.48 K/uL  Auto Eosinophil # : 0.16 K/uL  Auto Basophil # : 0.01 K/uL  Auto Neutrophil % : 63.4 %  Auto Lymphocyte % : 22.5 %  Auto Monocyte % : 10.3 %  Auto Eosinophil % : 3.4 %  Auto Basophil % : 0.2 %    Urinalysis Basic - ( 25 Jan 2024 09:19 )    Color: x / Appearance: x / SG: x / pH: x  Gluc: 175 mg/dL / Ketone: x  / Bili: x / Urobili: x   Blood: x / Protein: x / Nitrite: x   Leuk Esterase: x / RBC: x / WBC x   Sq Epi: x / Non Sq Epi: x / Bacteria: x      01-25    137  |  104  |  25<H>  ----------------------------<  175<H>  4.2   |  26  |  1.20    Ca    9.5      25 Jan 2024 09:19  Phos  2.8     01-25  Mg     2.1     01-25    TPro  6.7  /  Alb  3.4  /  TBili  0.7  /  DBili  x   /  AST  31  /  ALT  34  /  AlkPhos  67  01-25    Hemoglobin A1C:     Vitamin B12     RADIOLOGY    ASSESSMENT AND PLAN:      SEEN FOR UNSTEADY GAIT  PROB METABOLIC  MARCELO  HX OF CVA  BRAIN MR- NO ACUTE CVA    ON STATIN  INTOLERANT OF AP  Physical therapy evaluation.  OOB to chair/ambulation with assistance only.  Pain is accessed and addressed.  Would continue to follow.

## 2024-01-25 NOTE — PROGRESS NOTE ADULT - PROBLEM SELECTOR PLAN 3
- Hyponatremia likely secondary to poor PO salt intake and urinary retention  - Hyponatremia resolved at this moment  - Na on admission 133   - Nutrition consult, diet w/ ensure started  - Monitor BMP  - Nephrology consulted  - Resolved

## 2024-01-25 NOTE — PROGRESS NOTE ADULT - PROBLEM SELECTOR PLAN 8
Hypomagnesemia  -2g of magnesium sulfate IV ordered    Hypokalemia  -40mEv of potassium chloride PO

## 2024-01-26 ENCOUNTER — TRANSCRIPTION ENCOUNTER (OUTPATIENT)
Age: 84
End: 2024-01-26

## 2024-01-26 VITALS
RESPIRATION RATE: 16 BRPM | HEART RATE: 74 BPM | OXYGEN SATURATION: 97 % | TEMPERATURE: 98 F | DIASTOLIC BLOOD PRESSURE: 57 MMHG | SYSTOLIC BLOOD PRESSURE: 105 MMHG

## 2024-01-26 LAB
ALBUMIN SERPL ELPH-MCNC: 3.4 G/DL — SIGNIFICANT CHANGE UP (ref 3.3–5)
ALP SERPL-CCNC: 69 U/L — SIGNIFICANT CHANGE UP (ref 40–120)
ALT FLD-CCNC: 35 U/L — SIGNIFICANT CHANGE UP (ref 12–78)
ANION GAP SERPL CALC-SCNC: 6 MMOL/L — SIGNIFICANT CHANGE UP (ref 5–17)
AST SERPL-CCNC: 25 U/L — SIGNIFICANT CHANGE UP (ref 15–37)
BASOPHILS # BLD AUTO: 0.01 K/UL — SIGNIFICANT CHANGE UP (ref 0–0.2)
BASOPHILS NFR BLD AUTO: 0.2 % — SIGNIFICANT CHANGE UP (ref 0–2)
BILIRUB SERPL-MCNC: 0.6 MG/DL — SIGNIFICANT CHANGE UP (ref 0.2–1.2)
BUN SERPL-MCNC: 24 MG/DL — HIGH (ref 7–23)
CALCIUM SERPL-MCNC: 9.4 MG/DL — SIGNIFICANT CHANGE UP (ref 8.5–10.1)
CHLORIDE SERPL-SCNC: 104 MMOL/L — SIGNIFICANT CHANGE UP (ref 96–108)
CO2 SERPL-SCNC: 26 MMOL/L — SIGNIFICANT CHANGE UP (ref 22–31)
CREAT SERPL-MCNC: 1.2 MG/DL — SIGNIFICANT CHANGE UP (ref 0.5–1.3)
EGFR: 60 ML/MIN/1.73M2 — SIGNIFICANT CHANGE UP
EOSINOPHIL # BLD AUTO: 0.13 K/UL — SIGNIFICANT CHANGE UP (ref 0–0.5)
EOSINOPHIL NFR BLD AUTO: 2.4 % — SIGNIFICANT CHANGE UP (ref 0–6)
GLUCOSE SERPL-MCNC: 229 MG/DL — HIGH (ref 70–99)
HCT VFR BLD CALC: 33.3 % — LOW (ref 39–50)
HGB BLD-MCNC: 11.9 G/DL — LOW (ref 13–17)
IMM GRANULOCYTES NFR BLD AUTO: 0.2 % — SIGNIFICANT CHANGE UP (ref 0–0.9)
LYMPHOCYTES # BLD AUTO: 1.11 K/UL — SIGNIFICANT CHANGE UP (ref 1–3.3)
LYMPHOCYTES # BLD AUTO: 20.8 % — SIGNIFICANT CHANGE UP (ref 13–44)
MCHC RBC-ENTMCNC: 31.6 PG — SIGNIFICANT CHANGE UP (ref 27–34)
MCHC RBC-ENTMCNC: 35.7 GM/DL — SIGNIFICANT CHANGE UP (ref 32–36)
MCV RBC AUTO: 88.6 FL — SIGNIFICANT CHANGE UP (ref 80–100)
MONOCYTES # BLD AUTO: 0.58 K/UL — SIGNIFICANT CHANGE UP (ref 0–0.9)
MONOCYTES NFR BLD AUTO: 10.9 % — SIGNIFICANT CHANGE UP (ref 2–14)
NEUTROPHILS # BLD AUTO: 3.5 K/UL — SIGNIFICANT CHANGE UP (ref 1.8–7.4)
NEUTROPHILS NFR BLD AUTO: 65.5 % — SIGNIFICANT CHANGE UP (ref 43–77)
NRBC # BLD: 0 /100 WBCS — SIGNIFICANT CHANGE UP (ref 0–0)
PLATELET # BLD AUTO: 159 K/UL — SIGNIFICANT CHANGE UP (ref 150–400)
POTASSIUM SERPL-MCNC: 4.3 MMOL/L — SIGNIFICANT CHANGE UP (ref 3.5–5.3)
POTASSIUM SERPL-SCNC: 4.3 MMOL/L — SIGNIFICANT CHANGE UP (ref 3.5–5.3)
PROT SERPL-MCNC: 6.7 G/DL — SIGNIFICANT CHANGE UP (ref 6–8.3)
RBC # BLD: 3.76 M/UL — LOW (ref 4.2–5.8)
RBC # FLD: 11.9 % — SIGNIFICANT CHANGE UP (ref 10.3–14.5)
SODIUM SERPL-SCNC: 136 MMOL/L — SIGNIFICANT CHANGE UP (ref 135–145)
WBC # BLD: 5.34 K/UL — SIGNIFICANT CHANGE UP (ref 3.8–10.5)
WBC # FLD AUTO: 5.34 K/UL — SIGNIFICANT CHANGE UP (ref 3.8–10.5)

## 2024-01-26 PROCEDURE — 99239 HOSP IP/OBS DSCHRG MGMT >30: CPT

## 2024-01-26 PROCEDURE — 74176 CT ABD & PELVIS W/O CONTRAST: CPT

## 2024-01-26 PROCEDURE — 81001 URINALYSIS AUTO W/SCOPE: CPT

## 2024-01-26 PROCEDURE — 97116 GAIT TRAINING THERAPY: CPT

## 2024-01-26 PROCEDURE — 93306 TTE W/DOPPLER COMPLETE: CPT

## 2024-01-26 PROCEDURE — 84100 ASSAY OF PHOSPHORUS: CPT

## 2024-01-26 PROCEDURE — 83690 ASSAY OF LIPASE: CPT

## 2024-01-26 PROCEDURE — 97110 THERAPEUTIC EXERCISES: CPT

## 2024-01-26 PROCEDURE — 82962 GLUCOSE BLOOD TEST: CPT

## 2024-01-26 PROCEDURE — 93005 ELECTROCARDIOGRAM TRACING: CPT

## 2024-01-26 PROCEDURE — 84484 ASSAY OF TROPONIN QUANT: CPT

## 2024-01-26 PROCEDURE — 80061 LIPID PANEL: CPT

## 2024-01-26 PROCEDURE — 80053 COMPREHEN METABOLIC PANEL: CPT

## 2024-01-26 PROCEDURE — 83036 HEMOGLOBIN GLYCOSYLATED A1C: CPT

## 2024-01-26 PROCEDURE — 97162 PT EVAL MOD COMPLEX 30 MIN: CPT

## 2024-01-26 PROCEDURE — 99232 SBSQ HOSP IP/OBS MODERATE 35: CPT

## 2024-01-26 PROCEDURE — 70450 CT HEAD/BRAIN W/O DYE: CPT | Mod: MA

## 2024-01-26 PROCEDURE — 0225U NFCT DS DNA&RNA 21 SARSCOV2: CPT

## 2024-01-26 PROCEDURE — 85025 COMPLETE CBC W/AUTO DIFF WBC: CPT

## 2024-01-26 PROCEDURE — 82570 ASSAY OF URINE CREATININE: CPT

## 2024-01-26 PROCEDURE — 83735 ASSAY OF MAGNESIUM: CPT

## 2024-01-26 PROCEDURE — 97112 NEUROMUSCULAR REEDUCATION: CPT

## 2024-01-26 PROCEDURE — 70551 MRI BRAIN STEM W/O DYE: CPT

## 2024-01-26 PROCEDURE — 36415 COLL VENOUS BLD VENIPUNCTURE: CPT

## 2024-01-26 PROCEDURE — 84156 ASSAY OF PROTEIN URINE: CPT

## 2024-01-26 PROCEDURE — 71045 X-RAY EXAM CHEST 1 VIEW: CPT

## 2024-01-26 PROCEDURE — 97165 OT EVAL LOW COMPLEX 30 MIN: CPT

## 2024-01-26 PROCEDURE — 84443 ASSAY THYROID STIM HORMONE: CPT

## 2024-01-26 PROCEDURE — 99285 EMERGENCY DEPT VISIT HI MDM: CPT | Mod: 25

## 2024-01-26 PROCEDURE — 97535 SELF CARE MNGMENT TRAINING: CPT

## 2024-01-26 RX ORDER — TAMSULOSIN HYDROCHLORIDE 0.4 MG/1
1 CAPSULE ORAL
Qty: 30 | Refills: 0
Start: 2024-01-26 | End: 2024-02-24

## 2024-01-26 RX ORDER — SITAGLIPTIN 50 MG/1
1 TABLET, FILM COATED ORAL
Qty: 30 | Refills: 2
Start: 2024-01-26

## 2024-01-26 RX ADMIN — AMLODIPINE BESYLATE 5 MILLIGRAM(S): 2.5 TABLET ORAL at 05:38

## 2024-01-26 RX ADMIN — HEPARIN SODIUM 5000 UNIT(S): 5000 INJECTION INTRAVENOUS; SUBCUTANEOUS at 05:38

## 2024-01-26 RX ADMIN — POLYETHYLENE GLYCOL 3350 17 GRAM(S): 17 POWDER, FOR SOLUTION ORAL at 12:10

## 2024-01-26 RX ADMIN — Medication 10: at 12:10

## 2024-01-26 RX ADMIN — Medication 4: at 08:31

## 2024-01-26 NOTE — CHART NOTE - NSCHARTNOTEFT_GEN_A_CORE
Patient voided several times today.   post void residual 46.   stable for discharge with outpatient follow up Patient voided several times today.   post void residual 46.   stable for discharge with outpatient follow up    d/w neuro, intolerant to aspirin. not started at this time.

## 2024-01-26 NOTE — PROGRESS NOTE ADULT - PROBLEM SELECTOR PLAN 1
Type  A1c % adm  Recommend endocrine-Perlman on consult  Rx januvia 50 mg po; glucose monitor/supplies sent to pharmacy  FU appt: TBA  DSC recommendations: return to home adding januvia to regimen and glucose monitoring  diabetes education provided  Diabetes support info and cell # [phone] given   Goal 100-180 mg/dL; 140-180 mg/dL in critical care areas

## 2024-01-26 NOTE — DISCHARGE NOTE NURSING/CASE MANAGEMENT/SOCIAL WORK - PATIENT PORTAL LINK FT
You can access the FollowMyHealth Patient Portal offered by [facility] by registering at the following website: [email or web address]. By joining [facility]’s FollowMyHealth portal, you will also be able to view your health information using other applications (apps) compatible with our system.

## 2024-01-26 NOTE — DISCHARGE NOTE NURSING/CASE MANAGEMENT/SOCIAL WORK - NSSCTYPOFSERV_GEN_ALL_CORE
Physical therapy to visit the day after the hospital discharge.  Please contact  agency  at the above phone number if you have not heard from them by 12 noon on the day after your hospital discharge.

## 2024-01-26 NOTE — PROGRESS NOTE ADULT - ASSESSMENT
Physical Exam:   Vital Signs Last 24 Hrs  T(C): 36.2 (26 Jan 2024 12:01), Max: 36.7 (25 Jan 2024 22:35)  T(F): 97.1 (26 Jan 2024 12:01), Max: 98.1 (25 Jan 2024 22:35)  HR: 77 (26 Jan 2024 12:01) (72 - 78)  BP: 108/62 (26 Jan 2024 12:01) (108/62 - 137/76)  BP(mean): --  RR: 17 (26 Jan 2024 12:01) (17 - 17)  SpO2: 95% (26 Jan 2024 12:01) (94% - 98%)    Parameters below as of 26 Jan 2024 12:01  Patient On (Oxygen Delivery Method): room air             CAPILLARY BLOOD GLUCOSE      POCT Blood Glucose.: 355 mg/dL (26 Jan 2024 11:54)  POCT Blood Glucose.: 225 mg/dL (26 Jan 2024 08:12)  POCT Blood Glucose.: 323 mg/dL (25 Jan 2024 22:10)  POCT Blood Glucose.: 211 mg/dL (25 Jan 2024 16:47)      Cholesterol, Serum: 113 mg/dL (05.19.21 @ 08:36)     HDL Cholesterol, Serum: 22 mg/dL (05.19.21 @ 08:36)     LDL Cholesterol Calculated: 66 mg/dL (05.19.21 @ 08:36)     DIET: CC  >50%

## 2024-01-26 NOTE — CASE MANAGEMENT PROGRESS NOTE - NSCMPROGRESSNOTE_GEN_ALL_CORE
Per MD, patient is medically cleared for discharge home today.  CM met with patient to discussed discharge disposition home with Home PT.  referral was sent to [facility] but they are not able to accept due to staff shortage.  Referral sent to [facility] --awaiting acceptance. RW was delivered. Patient stated his family will transport him home. Patient verbalized understanding of the transition plan and is in agreement.  CM remains available throughout hospital stay.

## 2024-01-26 NOTE — PROGRESS NOTE ADULT - SUBJECTIVE AND OBJECTIVE BOX
Patient is a 83y old  Male who presents with a chief complaint of failure to thrive, marcelo, dehydration (25 Jan 2024 19:00)    Patient seen in follow up for MARCELO.        PAST MEDICAL HISTORY:  Diabetes    Hyperlipidemia    Kidney stone    CVA (cerebral vascular accident)      MEDICATIONS  (STANDING):  amLODIPine   Tablet 5 milliGRAM(s) Oral daily  atorvastatin 20 milliGRAM(s) Oral at bedtime  dextrose 5%. 1000 milliLiter(s) (100 mL/Hr) IV Continuous <Continuous>  dextrose 5%. 1000 milliLiter(s) (50 mL/Hr) IV Continuous <Continuous>  dextrose 50% Injectable 12.5 Gram(s) IV Push once  dextrose 50% Injectable 25 Gram(s) IV Push once  dextrose 50% Injectable 25 Gram(s) IV Push once  glucagon  Injectable 1 milliGRAM(s) IntraMuscular once  heparin   Injectable 5000 Unit(s) SubCutaneous every 8 hours  influenza  Vaccine (HIGH DOSE) 0.7 milliLiter(s) IntraMuscular once  insulin lispro (ADMELOG) corrective regimen sliding scale   SubCutaneous at bedtime  insulin lispro (ADMELOG) corrective regimen sliding scale   SubCutaneous three times a day before meals  polyethylene glycol 3350 17 Gram(s) Oral daily  senna 1 Tablet(s) Oral at bedtime  tamsulosin 0.4 milliGRAM(s) Oral at bedtime    MEDICATIONS  (PRN):  acetaminophen     Tablet .. 650 milliGRAM(s) Oral every 6 hours PRN Temp greater or equal to 38C (100.4F), Mild Pain (1 - 3)  dextrose Oral Gel 15 Gram(s) Oral once PRN Blood Glucose LESS THAN 70 milliGRAM(s)/deciliter    T(C): 36.2 (01-26-24 @ 12:01), Max: 36.7 (01-25-24 @ 06:03)  HR: 77 (01-26-24 @ 12:01) (72 - 78)  BP: 108/62 (01-26-24 @ 12:01) (108/62 - 164/65)  RR: 17 (01-26-24 @ 12:01) (17 - 18)  SpO2: 95% (01-26-24 @ 12:01) (93% - 98%)  Wt(kg): --  I&O's Detail    25 Jan 2024 07:01  -  26 Jan 2024 07:00  --------------------------------------------------------  IN:  Total IN: 0 mL    OUT:    Indwelling Catheter - Urethral (mL): 1200 mL    Voided (mL): 650 mL  Total OUT: 1850 mL    Total NET: -1850 mL      26 Jan 2024 07:01  -  26 Jan 2024 14:20  --------------------------------------------------------  IN:  Total IN: 0 mL    OUT:    Voided (mL): 275 mL  Total OUT: 275 mL    Total NET: -275 mL          PHYSICAL EXAM:  General: No distress  Respiratory: b/l air entry  Cardiovascular: S1 S2  Gastrointestinal: soft  Extremities:  no edema                              11.9   5.34  )-----------( 159      ( 26 Jan 2024 08:12 )             33.3     01-26    136  |  104  |  24<H>  ----------------------------<  229<H>  4.3   |  26  |  1.20    Ca    9.4      26 Jan 2024 08:12  Phos  2.8     01-25  Mg     2.1     01-25    TPro  6.7  /  Alb  3.4  /  TBili  0.6  /  DBili  x   /  AST  25  /  ALT  35  /  AlkPhos  69  01-26        LIVER FUNCTIONS - ( 26 Jan 2024 08:12 )  Alb: 3.4 g/dL / Pro: 6.7 g/dL / ALK PHOS: 69 U/L / ALT: 35 U/L / AST: 25 U/L / GGT: x           Urinalysis Basic - ( 26 Jan 2024 08:12 )    Color: x / Appearance: x / SG: x / pH: x  Gluc: 229 mg/dL / Ketone: x  / Bili: x / Urobili: x   Blood: x / Protein: x / Nitrite: x   Leuk Esterase: x / RBC: x / WBC x   Sq Epi: x / Non Sq Epi: x / Bacteria: x        Sodium, Serum: 136 (01-26 @ 08:12)  Sodium, Serum: 137 (01-25 @ 09:19)  Sodium, Serum: 137 (01-24 @ 07:16)  Sodium, Serum: 138 (01-23 @ 08:04)    Creatinine, Serum: 1.20 (01-26 @ 08:12)  Creatinine, Serum: 1.20 (01-25 @ 09:19)  Creatinine, Serum: 1.20 (01-24 @ 07:16)  Creatinine, Serum: 1.10 (01-23 @ 08:04)    Potassium, Serum: 4.3 (01-26 @ 08:12)  Potassium, Serum: 4.2 (01-25 @ 09:19)  Potassium, Serum: 3.8 (01-24 @ 07:16)  Potassium, Serum: 3.5 (01-23 @ 08:04)    Hemoglobin: 11.9 (01-26 @ 08:12)  Hemoglobin: 11.9 (01-25 @ 09:19)  Hemoglobin: 11.6 (01-24 @ 07:16)  Hemoglobin: 11.9 (01-23 @ 08:04)

## 2024-01-26 NOTE — DISCHARGE NOTE NURSING/CASE MANAGEMENT/SOCIAL WORK - NSDCPEFALRISK_GEN_ALL_CORE
For information on Fall & Injury Prevention, visit: [email or web address] OR  [email or web address] OR  https://www.cdc.gov/steadi/patient.html

## 2024-01-26 NOTE — PROGRESS NOTE ADULT - ASSESSMENT
MARCELO: Prerenal azotemia, Urinary retention  Urinary retention, BPH  Proteinuria: ? Diabetic nephropathy  Hypertension  h/o Nephrolithiasis (F/u with Dr. Irving as out pt)  Diabetes    Improved and stable renal indices. On flomax. Monitor blood sugar levels. Insulin coverage as needed.   Monitor BP trend. Titrate BP meds as needed. Salt restriction. Will follow electrolytes and renal function trend.

## 2024-01-26 NOTE — PROGRESS NOTE ADULT - SUBJECTIVE AND OBJECTIVE BOX
Neurology Follow up note    JOSE PHILLIPS83yMale    HPI:  83yM pmhx HTN, T2DM, CVA, brought to ED from home for generalized weakness. Patient had recent GI illness with diarrhea x 3 days with "countless" episodes of watery diarrhea. Patient states he's had no appetite for the last 3 days and has been unable to eat at all. Patient has had very low fluid intake. Patient is accompanied by daughter who states that for the last week he has been very weak and he has trouble standing up and walking around. Patient's daughter states that the patient tested positive for the flu last week. The patient denies any symptoms of chest pain, SOB, nausea, vomiting.     ED Course    Vitals: 97.8F, 89bpm, 104/71, 99% RA  Labs: Hgb 12.5, wbc 3.22, Na 133, BUN 29, Cr 1.9, glucose 274, trop wnl  CT head: Parenchymal volume loss and chronic microvessel ischemic changes are identified. Old lacunar infarcts involving the right basal ganglia region are again seen.  CXR: no acute findings  EKG: NSR    Received in ED: 1L NS x 2  (22 Jan 2024 15:54)      Interval History -no ha/dizziness    Patient is seen, chart was reviewed and case was discussed with the treatment team.  Pt is not in any distress.   Lying on bed comfortably.   .  Vital Signs Last 24 Hrs  T(C): 36.2 (26 Jan 2024 12:01), Max: 36.7 (25 Jan 2024 22:35)  T(F): 97.1 (26 Jan 2024 12:01), Max: 98.1 (25 Jan 2024 22:35)  HR: 77 (26 Jan 2024 12:01) (72 - 78)  BP: 108/62 (26 Jan 2024 12:01) (108/62 - 137/76)  BP(mean): --  RR: 17 (26 Jan 2024 12:01) (17 - 17)  SpO2: 95% (26 Jan 2024 12:01) (94% - 98%)    Parameters below as of 26 Jan 2024 12:01  Patient On (Oxygen Delivery Method): room air                REVIEW OF SYSTEMS:    Constitutional: No fever, weight loss or fatigue  Eyes: No eye pain, visual disturbances, or discharge  ENT:  No difficulty hearing, tinnitus, vertigo; No sinus or throat pain  Neck: No pain or stiffness  Respiratory: No cough, wheezing, chills or hemoptysis  Cardiovascular: No chest pain, palpitations, shortness of breath, dizziness or leg swelling  Gastrointestinal: No abdominal or epigastric pain. No nausea, vomiting or hemateme  Genitourinary: No dysuria, , hematuria   Neurological: No headaches, memory loss, loss of strength, numbness or tremors  Psychiatric: No depression, anxiety, mood swings or difficulty sleeping  Musculoskeletal: No joint pain or swelling; No muscle, back or extremity pain  Skin: No itching, burning, rashes or lesions   Lymph Nodes: No enlarged glands  Endocrine: No heat or cold intolerance; No hair loss,  Allergy and Immunologic: No hives or eczema    On Neurological Examination:    Mental Status - Pt is alert, awake, oriented X3. Follows commands well and able to answer questions appropriately.Mood and affect  normal    Speech -  Normal.    Cranial Nerves - Pupils 3 mm equal and reactive to light, extraocular eye movements intact. Pt has no visual field deficit.  Pt has no facial asymmetry. Facial sensation is intact.Tongue - is in midline.    Muscle tone - is normal      Motor Exam - 5/5 of UE  LE 4+/5  , No drift. No shaking or tremors.    Sensory Exam - Pt withdraws all extremities equally on stimulation. No asymmetry seen. No complaints of tingling, numbness.    Gait - Able to stand and walk unassisted.          coordination:    Finger to nose: normaL    .    Deep tendon Reflexes - 2 plus all over.            Neck Supple -  Yes.     MEDICATIONS  (STANDING):  amLODIPine   Tablet 5 milliGRAM(s) Oral daily  atorvastatin 20 milliGRAM(s) Oral at bedtime  dextrose 5%. 1000 milliLiter(s) (100 mL/Hr) IV Continuous <Continuous>  dextrose 5%. 1000 milliLiter(s) (50 mL/Hr) IV Continuous <Continuous>  dextrose 50% Injectable 12.5 Gram(s) IV Push once  dextrose 50% Injectable 25 Gram(s) IV Push once  dextrose 50% Injectable 25 Gram(s) IV Push once  glucagon  Injectable 1 milliGRAM(s) IntraMuscular once  heparin   Injectable 5000 Unit(s) SubCutaneous every 8 hours  influenza  Vaccine (HIGH DOSE) 0.7 milliLiter(s) IntraMuscular once  insulin lispro (ADMELOG) corrective regimen sliding scale   SubCutaneous three times a day before meals  insulin lispro (ADMELOG) corrective regimen sliding scale   SubCutaneous at bedtime  polyethylene glycol 3350 17 Gram(s) Oral daily  senna 1 Tablet(s) Oral at bedtime  tamsulosin 0.4 milliGRAM(s) Oral at bedtime    MEDICATIONS  (PRN):  acetaminophen     Tablet .. 650 milliGRAM(s) Oral every 6 hours PRN Temp greater or equal to 38C (100.4F), Mild Pain (1 - 3)  dextrose Oral Gel 15 Gram(s) Oral once PRN Blood Glucose LESS THAN 70 milliGRAM(s)/deciliter    Allergies    No Known Allergies    Intolerances                          11.9   5.34  )-----------( 159      ( 26 Jan 2024 08:12 )             33.3     )          Hemoglobin A1C:     Vitamin B12     RADIOLOGY    ASSESSMENT AND PLAN:      SEEN FOR UNSTEADY GAIT  PROB METABOLIC  MARCELO  HX OF CVA  BRAIN MR- NO ACUTE CVA    Discharge planning  ON STATIN  INTOLERANT OF APE(EXTREME BRUISING)  Physical therapy evaluation.  OOB to chair/ambulation with assistance only.  Pain is accessed and addressed.  Would continue to follow.

## 2024-01-28 ENCOUNTER — INPATIENT (INPATIENT)
Facility: HOSPITAL | Age: 84
LOS: 3 days | Discharge: HOME | DRG: 683 | End: 2024-02-01
Attending: FAMILY MEDICINE | Admitting: STUDENT IN AN ORGANIZED HEALTH CARE EDUCATION/TRAINING PROGRAM
Payer: MEDICARE

## 2024-01-28 VITALS
TEMPERATURE: 99 F | HEIGHT: 70 IN | WEIGHT: 154.98 LBS | SYSTOLIC BLOOD PRESSURE: 143 MMHG | RESPIRATION RATE: 17 BRPM | HEART RATE: 105 BPM | DIASTOLIC BLOOD PRESSURE: 57 MMHG | OXYGEN SATURATION: 99 %

## 2024-01-28 LAB
ACETONE SERPL-MCNC: NEGATIVE — SIGNIFICANT CHANGE UP
ALBUMIN SERPL ELPH-MCNC: 3.4 G/DL — SIGNIFICANT CHANGE UP (ref 3.3–5)
ALP SERPL-CCNC: 70 U/L — SIGNIFICANT CHANGE UP (ref 40–120)
ALT FLD-CCNC: 41 U/L — SIGNIFICANT CHANGE UP (ref 12–78)
ANION GAP SERPL CALC-SCNC: 8 MMOL/L — SIGNIFICANT CHANGE UP (ref 5–17)
APPEARANCE UR: ABNORMAL
APTT BLD: 26.6 SEC — SIGNIFICANT CHANGE UP (ref 24.5–35.6)
AST SERPL-CCNC: 22 U/L — SIGNIFICANT CHANGE UP (ref 15–37)
BACTERIA # UR AUTO: ABNORMAL /HPF
BASE EXCESS BLDV CALC-SCNC: -3.2 MMOL/L — LOW (ref -2–3)
BASOPHILS # BLD AUTO: 0 K/UL — SIGNIFICANT CHANGE UP (ref 0–0.2)
BASOPHILS NFR BLD AUTO: 0 % — SIGNIFICANT CHANGE UP (ref 0–2)
BILIRUB SERPL-MCNC: 1.5 MG/DL — HIGH (ref 0.2–1.2)
BILIRUB UR-MCNC: NEGATIVE — SIGNIFICANT CHANGE UP
BLOOD GAS COMMENTS, VENOUS: SIGNIFICANT CHANGE UP
BUN SERPL-MCNC: 39 MG/DL — HIGH (ref 7–23)
CALCIUM SERPL-MCNC: 8.8 MG/DL — SIGNIFICANT CHANGE UP (ref 8.5–10.1)
CHLORIDE SERPL-SCNC: 96 MMOL/L — SIGNIFICANT CHANGE UP (ref 96–108)
CO2 SERPL-SCNC: 22 MMOL/L — SIGNIFICANT CHANGE UP (ref 22–31)
COLOR SPEC: YELLOW — SIGNIFICANT CHANGE UP
COMMENT - URINE: SIGNIFICANT CHANGE UP
CREAT SERPL-MCNC: 2 MG/DL — HIGH (ref 0.5–1.3)
DIFF PNL FLD: ABNORMAL
EGFR: 33 ML/MIN/1.73M2 — LOW
EOSINOPHIL # BLD AUTO: 0.01 K/UL — SIGNIFICANT CHANGE UP (ref 0–0.5)
EOSINOPHIL NFR BLD AUTO: 0.1 % — SIGNIFICANT CHANGE UP (ref 0–6)
EPI CELLS # UR: PRESENT
GAS PNL BLDV: SIGNIFICANT CHANGE UP
GLUCOSE SERPL-MCNC: 386 MG/DL — HIGH (ref 70–99)
GLUCOSE UR QL: >=1000 MG/DL
HCO3 BLDV-SCNC: 22 MMOL/L — SIGNIFICANT CHANGE UP (ref 22–29)
HCT VFR BLD CALC: 28.8 % — LOW (ref 39–50)
HGB BLD-MCNC: 10.1 G/DL — LOW (ref 13–17)
IMM GRANULOCYTES NFR BLD AUTO: 0.5 % — SIGNIFICANT CHANGE UP (ref 0–0.9)
INR BLD: 1.11 RATIO — SIGNIFICANT CHANGE UP (ref 0.85–1.18)
KETONES UR-MCNC: NEGATIVE MG/DL — SIGNIFICANT CHANGE UP
LACTATE SERPL-SCNC: 2.4 MMOL/L — HIGH (ref 0.7–2)
LEUKOCYTE ESTERASE UR-ACNC: ABNORMAL
LYMPHOCYTES # BLD AUTO: 0.35 K/UL — LOW (ref 1–3.3)
LYMPHOCYTES # BLD AUTO: 4.1 % — LOW (ref 13–44)
MCHC RBC-ENTMCNC: 32 PG — SIGNIFICANT CHANGE UP (ref 27–34)
MCHC RBC-ENTMCNC: 35.1 GM/DL — SIGNIFICANT CHANGE UP (ref 32–36)
MCV RBC AUTO: 91.1 FL — SIGNIFICANT CHANGE UP (ref 80–100)
MONOCYTES # BLD AUTO: 0.93 K/UL — HIGH (ref 0–0.9)
MONOCYTES NFR BLD AUTO: 10.9 % — SIGNIFICANT CHANGE UP (ref 2–14)
NEUTROPHILS # BLD AUTO: 7.24 K/UL — SIGNIFICANT CHANGE UP (ref 1.8–7.4)
NEUTROPHILS NFR BLD AUTO: 84.4 % — HIGH (ref 43–77)
NITRITE UR-MCNC: NEGATIVE — SIGNIFICANT CHANGE UP
NRBC # BLD: 0 /100 WBCS — SIGNIFICANT CHANGE UP (ref 0–0)
PCO2 BLDV: 36 MMHG — LOW (ref 42–55)
PH BLDV: 7.39 — SIGNIFICANT CHANGE UP (ref 7.32–7.43)
PH UR: 5 — SIGNIFICANT CHANGE UP (ref 5–8)
PLATELET # BLD AUTO: 217 K/UL — SIGNIFICANT CHANGE UP (ref 150–400)
PO2 BLDV: 148 MMHG — HIGH (ref 25–45)
POTASSIUM SERPL-MCNC: 4.5 MMOL/L — SIGNIFICANT CHANGE UP (ref 3.5–5.3)
POTASSIUM SERPL-SCNC: 4.5 MMOL/L — SIGNIFICANT CHANGE UP (ref 3.5–5.3)
PROT SERPL-MCNC: 6.9 G/DL — SIGNIFICANT CHANGE UP (ref 6–8.3)
PROT UR-MCNC: 100 MG/DL
PROTHROM AB SERPL-ACNC: 12.9 SEC — SIGNIFICANT CHANGE UP (ref 9.5–13)
RBC # BLD: 3.16 M/UL — LOW (ref 4.2–5.8)
RBC # FLD: 11.9 % — SIGNIFICANT CHANGE UP (ref 10.3–14.5)
RBC CASTS # UR COMP ASSIST: 5 /HPF — HIGH (ref 0–4)
SAO2 % BLDV: 100 % — HIGH (ref 67–88)
SODIUM SERPL-SCNC: 126 MMOL/L — LOW (ref 135–145)
SP GR SPEC: 1.02 — SIGNIFICANT CHANGE UP (ref 1–1.03)
TROPONIN I, HIGH SENSITIVITY RESULT: 9.2 NG/L — SIGNIFICANT CHANGE UP
UROBILINOGEN FLD QL: 1 MG/DL — SIGNIFICANT CHANGE UP (ref 0.2–1)
WBC # BLD: 8.57 K/UL — SIGNIFICANT CHANGE UP (ref 3.8–10.5)
WBC # FLD AUTO: 8.57 K/UL — SIGNIFICANT CHANGE UP (ref 3.8–10.5)
WBC UR QL: ABNORMAL /HPF (ref 0–5)

## 2024-01-28 PROCEDURE — 99285 EMERGENCY DEPT VISIT HI MDM: CPT

## 2024-01-28 PROCEDURE — 93010 ELECTROCARDIOGRAM REPORT: CPT

## 2024-01-28 PROCEDURE — 74176 CT ABD & PELVIS W/O CONTRAST: CPT | Mod: 26,MA

## 2024-01-28 PROCEDURE — 71045 X-RAY EXAM CHEST 1 VIEW: CPT | Mod: 26

## 2024-01-28 PROCEDURE — 70450 CT HEAD/BRAIN W/O DYE: CPT | Mod: 26,MA

## 2024-01-28 RX ORDER — CEFTRIAXONE 500 MG/1
1000 INJECTION, POWDER, FOR SOLUTION INTRAMUSCULAR; INTRAVENOUS ONCE
Refills: 0 | Status: COMPLETED | OUTPATIENT
Start: 2024-01-28 | End: 2024-01-28

## 2024-01-28 RX ORDER — SODIUM CHLORIDE 9 MG/ML
1000 INJECTION, SOLUTION INTRAVENOUS ONCE
Refills: 0 | Status: COMPLETED | OUTPATIENT
Start: 2024-01-28 | End: 2024-01-28

## 2024-01-28 RX ADMIN — CEFTRIAXONE 100 MILLIGRAM(S): 500 INJECTION, POWDER, FOR SOLUTION INTRAMUSCULAR; INTRAVENOUS at 21:30

## 2024-01-28 RX ADMIN — SODIUM CHLORIDE 1000 MILLILITER(S): 9 INJECTION, SOLUTION INTRAVENOUS at 19:16

## 2024-01-28 NOTE — ED ADULT NURSE NOTE - NSFALLHARMRISKINTERV_ED_ALL_ED

## 2024-01-28 NOTE — ED PROVIDER NOTE - CLINICAL SUMMARY MEDICAL DECISION MAKING FREE TEXT BOX
here with hyperglycemia, weakness, dysuria- check labs, bladder scan for need for florez, xray, hydration, re-evaluation.

## 2024-01-28 NOTE — ED ADULT NURSE NOTE - TEMPLATE LIST FOR HEAD TO TOE ASSESSMENT
Writer was asked by GEMA to call 911 to check on ambulance due to nobody arrived at patient's home yet. Writer called 911 and spoke with dispatch. Writer advised of patient being actively suicidal with means, patient also possibly intoxicated at this time and endorses drinking today. Dispatch stated they are sending an ambulance to the residence. Writer updated GEMA and requested he stay on the phone with patient until ambulance arrives. Gabriel MONROE   General

## 2024-01-28 NOTE — ED PROVIDER NOTE - NS ED MD DISPO ADMITTING SERVICE
Jennifer Bird is a 37 year old female presenting for a mirela.  Hx of Melanoma  Medications verified, no changes.   Denies known Latex allergy or symptoms of Latex sensitivity.     MED

## 2024-01-28 NOTE — ED PROVIDER NOTE - OBJECTIVE STATEMENT
83y M pmhx HTN, T2DM, CVA, admission at [facility] 1/22/24 to 1/26/24 for emerald, dehydration, urinary retention here with family for evaluation. patient was doing well at home then today was noted to have an elevated blood glucose. patient had diabetes medications changed recently to Metformin and Januvia.  Which she took today.  Family had him drink 32 ounces of water and walk around and he seemed to be doing better.  Repeat blood sugar was still in the upper 500s so family gave him glipizide 10 mg which he used to take. Upon arrival and try to get out of the car family noted patient was shivering and had weakness to where he was unable to stand on his own or walk. patient has been having dysuria and feeling of incomplete emptying as well as constipation although last BM was earlier today. No headache, cough, chest pain, shortness of breath, abdominal pain.

## 2024-01-28 NOTE — ED PROVIDER NOTE - PHYSICAL EXAMINATION
Vital signs as available reviewed.  General:  No acute distress.  Head:  Normocephalic, atraumatic.  Eyes:  Conjunctiva pink, no icterus.  Cardiovascular:  Regular rate, no obvious murmur.  Respiratory:  Clear to auscultation, good air entry bilaterally.  Abdomen:  Soft, minimal suprapubic tenderness to palpation.  Musculoskeletal:  No obvious deformity.  Neurologic: Alert and oriented, follows commands, extra-occular movements intact, no visual fields defect, no facial asymmetry, no upper extremity drift, no lower extremity drift, rapid alternating movements performed without difficulty, no sensory deficit, no aphasia, no dysarthria, no inattention.   Skin:  Warm and dry.

## 2024-01-28 NOTE — ED PROVIDER NOTE - CARE PLAN
Principal Discharge DX:	Acute UTI  Secondary Diagnosis:	MARCELO (acute kidney injury)  Secondary Diagnosis:	Weakness  Secondary Diagnosis:	Diabetes mellitus with hyperglycemia   1

## 2024-01-28 NOTE — ED ADULT NURSE NOTE - OBJECTIVE STATEMENT
pt to er c/o weakness also states he feels his bladder is full bladder scan done 19 cc seen on scan iv started 20 angio right arm labs drawn iv infusing denies sob family at bedside with pt

## 2024-01-28 NOTE — ED ADULT NURSE REASSESSMENT NOTE - NS ED NURSE REASSESS COMMENT FT1
pt received from day shift. pt in no acute distress, breathing on own. daughter at bedside. pt safety maintained.

## 2024-01-28 NOTE — ED ADULT TRIAGE NOTE - CHIEF COMPLAINT QUOTE
C/O Generalized weakness and Elevated blood sugar today, was in the Hospital  last week from monday to thursday for dehydration

## 2024-01-29 DIAGNOSIS — N39.0 URINARY TRACT INFECTION, SITE NOT SPECIFIED: ICD-10-CM

## 2024-01-29 DIAGNOSIS — E87.1 HYPO-OSMOLALITY AND HYPONATREMIA: ICD-10-CM

## 2024-01-29 DIAGNOSIS — N17.9 ACUTE KIDNEY FAILURE, UNSPECIFIED: ICD-10-CM

## 2024-01-29 DIAGNOSIS — R09.89 OTHER SPECIFIED SYMPTOMS AND SIGNS INVOLVING THE CIRCULATORY AND RESPIRATORY SYSTEMS: ICD-10-CM

## 2024-01-29 DIAGNOSIS — E78.5 HYPERLIPIDEMIA, UNSPECIFIED: ICD-10-CM

## 2024-01-29 DIAGNOSIS — N40.0 BENIGN PROSTATIC HYPERPLASIA WITHOUT LOWER URINARY TRACT SYMPTOMS: ICD-10-CM

## 2024-01-29 DIAGNOSIS — E11.9 TYPE 2 DIABETES MELLITUS WITHOUT COMPLICATIONS: ICD-10-CM

## 2024-01-29 DIAGNOSIS — I10 ESSENTIAL (PRIMARY) HYPERTENSION: ICD-10-CM

## 2024-01-29 DIAGNOSIS — Z29.9 ENCOUNTER FOR PROPHYLACTIC MEASURES, UNSPECIFIED: ICD-10-CM

## 2024-01-29 DIAGNOSIS — D64.9 ANEMIA, UNSPECIFIED: ICD-10-CM

## 2024-01-29 LAB
-  ENTEROBACTER (NON-CLOACAE COMPLEX): SIGNIFICANT CHANGE UP
ALBUMIN SERPL ELPH-MCNC: 3.1 G/DL — LOW (ref 3.3–5)
ALP SERPL-CCNC: 66 U/L — SIGNIFICANT CHANGE UP (ref 40–120)
ALT FLD-CCNC: 35 U/L — SIGNIFICANT CHANGE UP (ref 12–78)
ANION GAP SERPL CALC-SCNC: 8 MMOL/L — SIGNIFICANT CHANGE UP (ref 5–17)
AST SERPL-CCNC: 23 U/L — SIGNIFICANT CHANGE UP (ref 15–37)
BASOPHILS # BLD AUTO: 0.01 K/UL — SIGNIFICANT CHANGE UP (ref 0–0.2)
BASOPHILS NFR BLD AUTO: 0.1 % — SIGNIFICANT CHANGE UP (ref 0–2)
BILIRUB SERPL-MCNC: 0.9 MG/DL — SIGNIFICANT CHANGE UP (ref 0.2–1.2)
BUN SERPL-MCNC: 33 MG/DL — HIGH (ref 7–23)
CALCIUM SERPL-MCNC: 8.9 MG/DL — SIGNIFICANT CHANGE UP (ref 8.5–10.1)
CHLORIDE SERPL-SCNC: 102 MMOL/L — SIGNIFICANT CHANGE UP (ref 96–108)
CO2 SERPL-SCNC: 25 MMOL/L — SIGNIFICANT CHANGE UP (ref 22–31)
CREAT SERPL-MCNC: 1.4 MG/DL — HIGH (ref 0.5–1.3)
EGFR: 50 ML/MIN/1.73M2 — LOW
EOSINOPHIL # BLD AUTO: 0.03 K/UL — SIGNIFICANT CHANGE UP (ref 0–0.5)
EOSINOPHIL NFR BLD AUTO: 0.4 % — SIGNIFICANT CHANGE UP (ref 0–6)
FERRITIN SERPL-MCNC: 531 NG/ML — HIGH (ref 30–400)
FLUAV AG NPH QL: SIGNIFICANT CHANGE UP
FLUBV AG NPH QL: SIGNIFICANT CHANGE UP
GLUCOSE SERPL-MCNC: 152 MG/DL — HIGH (ref 70–99)
GRAM STN FLD: ABNORMAL
HCT VFR BLD CALC: 27.7 % — LOW (ref 39–50)
HGB BLD-MCNC: 9.9 G/DL — LOW (ref 13–17)
IMM GRANULOCYTES NFR BLD AUTO: 0.7 % — SIGNIFICANT CHANGE UP (ref 0–0.9)
IRON SATN MFR SERPL: 20 UG/DL — LOW (ref 45–165)
IRON SATN MFR SERPL: 8 % — LOW (ref 16–55)
LACTATE SERPL-SCNC: 0.8 MMOL/L — SIGNIFICANT CHANGE UP (ref 0.7–2)
LYMPHOCYTES # BLD AUTO: 0.62 K/UL — LOW (ref 1–3.3)
LYMPHOCYTES # BLD AUTO: 8.1 % — LOW (ref 13–44)
MCHC RBC-ENTMCNC: 32.1 PG — SIGNIFICANT CHANGE UP (ref 27–34)
MCHC RBC-ENTMCNC: 35.7 GM/DL — SIGNIFICANT CHANGE UP (ref 32–36)
MCV RBC AUTO: 89.9 FL — SIGNIFICANT CHANGE UP (ref 80–100)
METHOD TYPE: SIGNIFICANT CHANGE UP
MONOCYTES # BLD AUTO: 0.97 K/UL — HIGH (ref 0–0.9)
MONOCYTES NFR BLD AUTO: 12.6 % — SIGNIFICANT CHANGE UP (ref 2–14)
NEUTROPHILS # BLD AUTO: 6.01 K/UL — SIGNIFICANT CHANGE UP (ref 1.8–7.4)
NEUTROPHILS NFR BLD AUTO: 78.1 % — HIGH (ref 43–77)
NRBC # BLD: 0 /100 WBCS — SIGNIFICANT CHANGE UP (ref 0–0)
PLATELET # BLD AUTO: 225 K/UL — SIGNIFICANT CHANGE UP (ref 150–400)
POTASSIUM SERPL-MCNC: 3.6 MMOL/L — SIGNIFICANT CHANGE UP (ref 3.5–5.3)
POTASSIUM SERPL-SCNC: 3.6 MMOL/L — SIGNIFICANT CHANGE UP (ref 3.5–5.3)
PROT SERPL-MCNC: 6.5 G/DL — SIGNIFICANT CHANGE UP (ref 6–8.3)
RBC # BLD: 3.08 M/UL — LOW (ref 4.2–5.8)
RBC # FLD: 11.9 % — SIGNIFICANT CHANGE UP (ref 10.3–14.5)
RSV RNA NPH QL NAA+NON-PROBE: SIGNIFICANT CHANGE UP
SARS-COV-2 RNA SPEC QL NAA+PROBE: SIGNIFICANT CHANGE UP
SODIUM SERPL-SCNC: 135 MMOL/L — SIGNIFICANT CHANGE UP (ref 135–145)
SPECIMEN SOURCE: SIGNIFICANT CHANGE UP
SPECIMEN SOURCE: SIGNIFICANT CHANGE UP
TIBC SERPL-MCNC: 235 UG/DL — SIGNIFICANT CHANGE UP (ref 220–430)
TRANSFERRIN SERPL-MCNC: 159 MG/DL — LOW (ref 200–360)
UIBC SERPL-MCNC: 216 UG/DL — SIGNIFICANT CHANGE UP (ref 110–370)
WBC # BLD: 7.69 K/UL — SIGNIFICANT CHANGE UP (ref 3.8–10.5)
WBC # FLD AUTO: 7.69 K/UL — SIGNIFICANT CHANGE UP (ref 3.8–10.5)

## 2024-01-29 PROCEDURE — 99223 1ST HOSP IP/OBS HIGH 75: CPT | Mod: GC

## 2024-01-29 RX ORDER — SODIUM CHLORIDE 9 MG/ML
1000 INJECTION INTRAMUSCULAR; INTRAVENOUS; SUBCUTANEOUS
Refills: 0 | Status: DISCONTINUED | OUTPATIENT
Start: 2024-01-29 | End: 2024-02-01

## 2024-01-29 RX ORDER — AMLODIPINE BESYLATE 2.5 MG/1
1 TABLET ORAL
Refills: 0 | DISCHARGE

## 2024-01-29 RX ORDER — DEXTROSE 50 % IN WATER 50 %
25 SYRINGE (ML) INTRAVENOUS ONCE
Refills: 0 | Status: DISCONTINUED | OUTPATIENT
Start: 2024-01-29 | End: 2024-02-01

## 2024-01-29 RX ORDER — SODIUM CHLORIDE 9 MG/ML
1000 INJECTION INTRAMUSCULAR; INTRAVENOUS; SUBCUTANEOUS
Refills: 0 | Status: DISCONTINUED | OUTPATIENT
Start: 2024-01-29 | End: 2024-01-29

## 2024-01-29 RX ORDER — ROSUVASTATIN CALCIUM 5 MG/1
1 TABLET ORAL
Refills: 0 | DISCHARGE

## 2024-01-29 RX ORDER — DEXTROSE 50 % IN WATER 50 %
12.5 SYRINGE (ML) INTRAVENOUS ONCE
Refills: 0 | Status: DISCONTINUED | OUTPATIENT
Start: 2024-01-29 | End: 2024-02-01

## 2024-01-29 RX ORDER — INFLUENZA VIRUS VACCINE 15; 15; 15; 15 UG/.5ML; UG/.5ML; UG/.5ML; UG/.5ML
0.7 SUSPENSION INTRAMUSCULAR ONCE
Refills: 0 | Status: DISCONTINUED | OUTPATIENT
Start: 2024-01-29 | End: 2024-02-01

## 2024-01-29 RX ORDER — DEXTROSE 50 % IN WATER 50 %
15 SYRINGE (ML) INTRAVENOUS ONCE
Refills: 0 | Status: DISCONTINUED | OUTPATIENT
Start: 2024-01-29 | End: 2024-02-01

## 2024-01-29 RX ORDER — METFORMIN HYDROCHLORIDE 850 MG/1
1 TABLET ORAL
Refills: 0 | DISCHARGE

## 2024-01-29 RX ORDER — RAMIPRIL 5 MG
1 CAPSULE ORAL
Refills: 0 | DISCHARGE

## 2024-01-29 RX ORDER — SODIUM CHLORIDE 9 MG/ML
1000 INJECTION, SOLUTION INTRAVENOUS
Refills: 0 | Status: DISCONTINUED | OUTPATIENT
Start: 2024-01-29 | End: 2024-02-01

## 2024-01-29 RX ORDER — CEFTRIAXONE 500 MG/1
1000 INJECTION, POWDER, FOR SOLUTION INTRAMUSCULAR; INTRAVENOUS EVERY 24 HOURS
Refills: 0 | Status: DISCONTINUED | OUTPATIENT
Start: 2024-01-29 | End: 2024-01-29

## 2024-01-29 RX ORDER — ATORVASTATIN CALCIUM 80 MG/1
20 TABLET, FILM COATED ORAL AT BEDTIME
Refills: 0 | Status: DISCONTINUED | OUTPATIENT
Start: 2024-01-29 | End: 2024-02-01

## 2024-01-29 RX ORDER — HEPARIN SODIUM 5000 [USP'U]/ML
5000 INJECTION INTRAVENOUS; SUBCUTANEOUS EVERY 8 HOURS
Refills: 0 | Status: DISCONTINUED | OUTPATIENT
Start: 2024-01-29 | End: 2024-02-01

## 2024-01-29 RX ORDER — GLUCAGON INJECTION, SOLUTION 0.5 MG/.1ML
1 INJECTION, SOLUTION SUBCUTANEOUS ONCE
Refills: 0 | Status: DISCONTINUED | OUTPATIENT
Start: 2024-01-29 | End: 2024-02-01

## 2024-01-29 RX ORDER — TAMSULOSIN HYDROCHLORIDE 0.4 MG/1
0.4 CAPSULE ORAL AT BEDTIME
Refills: 0 | Status: DISCONTINUED | OUTPATIENT
Start: 2024-01-29 | End: 2024-02-01

## 2024-01-29 RX ORDER — INSULIN LISPRO 100/ML
VIAL (ML) SUBCUTANEOUS AT BEDTIME
Refills: 0 | Status: DISCONTINUED | OUTPATIENT
Start: 2024-01-29 | End: 2024-02-01

## 2024-01-29 RX ORDER — CEFTRIAXONE 500 MG/1
2000 INJECTION, POWDER, FOR SOLUTION INTRAMUSCULAR; INTRAVENOUS EVERY 24 HOURS
Refills: 0 | Status: DISCONTINUED | OUTPATIENT
Start: 2024-01-29 | End: 2024-02-01

## 2024-01-29 RX ORDER — INSULIN LISPRO 100/ML
VIAL (ML) SUBCUTANEOUS
Refills: 0 | Status: DISCONTINUED | OUTPATIENT
Start: 2024-01-29 | End: 2024-02-01

## 2024-01-29 RX ORDER — AMLODIPINE BESYLATE 2.5 MG/1
5 TABLET ORAL DAILY
Refills: 0 | Status: DISCONTINUED | OUTPATIENT
Start: 2024-01-29 | End: 2024-02-01

## 2024-01-29 RX ADMIN — HEPARIN SODIUM 5000 UNIT(S): 5000 INJECTION INTRAVENOUS; SUBCUTANEOUS at 05:13

## 2024-01-29 RX ADMIN — SODIUM CHLORIDE 1000 MILLILITER(S): 9 INJECTION, SOLUTION INTRAVENOUS at 00:28

## 2024-01-29 RX ADMIN — HEPARIN SODIUM 5000 UNIT(S): 5000 INJECTION INTRAVENOUS; SUBCUTANEOUS at 15:03

## 2024-01-29 RX ADMIN — Medication 2: at 18:35

## 2024-01-29 RX ADMIN — Medication 1: at 13:00

## 2024-01-29 RX ADMIN — SODIUM CHLORIDE 50 MILLILITER(S): 9 INJECTION INTRAMUSCULAR; INTRAVENOUS; SUBCUTANEOUS at 21:03

## 2024-01-29 RX ADMIN — AMLODIPINE BESYLATE 5 MILLIGRAM(S): 2.5 TABLET ORAL at 05:13

## 2024-01-29 RX ADMIN — SODIUM CHLORIDE 75 MILLILITER(S): 9 INJECTION INTRAMUSCULAR; INTRAVENOUS; SUBCUTANEOUS at 05:12

## 2024-01-29 RX ADMIN — ATORVASTATIN CALCIUM 20 MILLIGRAM(S): 80 TABLET, FILM COATED ORAL at 21:09

## 2024-01-29 RX ADMIN — CEFTRIAXONE 100 MILLIGRAM(S): 500 INJECTION, POWDER, FOR SOLUTION INTRAMUSCULAR; INTRAVENOUS at 20:19

## 2024-01-29 RX ADMIN — TAMSULOSIN HYDROCHLORIDE 0.4 MILLIGRAM(S): 0.4 CAPSULE ORAL at 21:09

## 2024-01-29 RX ADMIN — HEPARIN SODIUM 5000 UNIT(S): 5000 INJECTION INTRAVENOUS; SUBCUTANEOUS at 21:08

## 2024-01-29 RX ADMIN — CEFTRIAXONE 1000 MILLIGRAM(S): 500 INJECTION, POWDER, FOR SOLUTION INTRAMUSCULAR; INTRAVENOUS at 00:28

## 2024-01-29 NOTE — H&P ADULT - PROBLEM SELECTOR PLAN 3
Chronic:  - VBG on admission without acidosis with normal anion gap  - Blood glucose on admission was 442, improving s/p LR bolus x1 in ED  - HA1c on last admission was 7.2   - Recently added Januvia 50mg daily on last admission, in addition to metformin 750mg BID pt was already taking  - Hold home Januvia and metformin  - Low dose ISS  - monitor finger sticks  - CC diet Chronic:  - VBG on admission without acidosis with normal anion gap  - Blood glucose on admission was 442, improving s/p LR bolus x1 in ED  - HA1c on last admission was 7.2   - Recently added Januvia 50mg daily on last admission, in addition to metformin 750mg BID pt was already taking  - Hold home Januvia and metformin  - c/w maintenance NS at 75 cc/hr for 8 hours   - Low dose ISS  - monitor finger sticks  - CC diet

## 2024-01-29 NOTE — H&P ADULT - PROBLEM SELECTOR PLAN 4
Na 126 on admission   - Hx of hyponatremia   - S/p LR bolus x1 in ED  - F/u AM BMP  - Nephro consulted, f/u recs Corrected Na 134 on admission  - Hx of hyponatremia   - S/p LR bolus x1 in ED  - c/w maintenance NS at 75 cc/hr for 8 hours   - F/u AM BMP

## 2024-01-29 NOTE — H&P ADULT - NSHPLABSRESULTS_GEN_ALL_CORE
< from: CT Abdomen and Pelvis No Cont (01.28.24 @ 22:06) >      IMPRESSION:  Findings most consistent with cystitis, recommend correlation with   urinalysis.    No hydronephrosis or obstructing calculus.        --- End of Report ---    < end of copied text >      < from: CT Head No Cont (01.28.24 @ 22:06) >    IMPRESSION:  No large territory acute infarct, intracranial hemorrhage, or mass effect.    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    --- End of Report ---    < end of copied text >

## 2024-01-29 NOTE — H&P ADULT - NSHPSOCIALHISTORY_GEN_ALL_CORE
Tobacco: former smoker  EtOH: denies  Recreational drug use: denies   Lives with: family at home  Ambulates: independently without assistive devices  ADLs: independent

## 2024-01-29 NOTE — H&P ADULT - PROBLEM/PLAN-10
DISPLAY PLAN FREE TEXT [FreeTextEntry1] : Patient examined and evaluated at this time.\par Continue local wound care and offloading.\par AgAlginate,Adaptic touch and coban dressing weekly\par Patient to follow up in 1 week.\par Spent 20 minutes for patient care and medical decision making.\par

## 2024-01-29 NOTE — H&P ADULT - NSHPPOAPULMEMBOLUS_GEN_A_CORE
Lab: [id number] Destruction After The Procedure: No Dressing: bandage Post-Care Instructions: I reviewed with the patient in detail post-care instructions. Patient is to keep the biopsy site dry overnight, and then apply bacitracin twice daily until healed. Patient may apply hydrogen peroxide soaks to remove any crusting. Size Of Lesion In Cm: 0 Hemostasis: Drysol Wound Care: Bacitracin Detail Level: Detailed Biopsy Method: curette Consent: Written consent was obtained and risks were reviewed including but not limited to scarring, infection, bleeding, scabbing, incomplete removal, nerve damage and allergy to anesthesia. Billing Type: Third-Party Bill Type Of Destruction Used: Curettage Notification Instructions: Patient will be notified of biopsy results. However, patient instructed to call the office if not contacted within 2 weeks. Anesthesia Type: 1% lidocaine with epinephrine no Biopsy Type: H and E Anesthesia Volume In Cc: 0.5

## 2024-01-29 NOTE — H&P ADULT - ATTENDING COMMENTS
83yM pmhx HTN, T2DM, CVA brought to ED by family for elevated blood glucose levels at home with dysuria, intermittent chills. Admitted for UTI and MARCELO.     Agree with above. Edited where appropriate

## 2024-01-29 NOTE — H&P ADULT - HISTORY OF PRESENT ILLNESS
83yM pmhx HTN, T2DM, CVA brought to ED by family for elevated blood glucose levels at home. Pt with recent admission at \[facility]\"" from 1/22/24 to 1/26/24 for management of MARCELO, dehydration, and urinary retention (florez removed prior to discharge). Pt was started on Januvia 50mg daily in addition to his metformin 750mg twice a day, which he has been adhering to. However, today pt noted elevated blood glucose levels in 500s with associated weakness and intermittent chills noted by family. Repeat blood glucose was still elevated in 500s after eating and drinking. Pt then took glipizide 10mg, which he was previously on. Pt also endorsed pain on urination and incomplete emptying when urinating for one day. Of note pt had florez placed on last admission that was removed prior to discharge. Denies current dysuria, abdominal pain, SOB, chest pain, subjective fevers, or chills at present.     ED course  Vitals: T 98.8F, , /57, RR 17, SpO2 99% on RA  Significant labs: H/H 10.1/28.8, Na 126, BUN 39, Cr 2.00, lactate 2.4, UA with cloudy urine, 100 protein, large leukocyte esterase, WBC too numerous to count, many bacteria, glucose >1000   Imaging:   - CT Head: No large territory acute infarct, intracranial hemorrhage, or mass effect. Stable chronic lacunar infarcts with bilateral microangiopathic white matter changes  - CT A/P: findings consistent with cystitis; no hydronephrosis or obstructing calculus  - CXR negative on wet read, f/u official read  EKG: sinus rhythm with 1st degree AV block, HR 86, QTc 473  In ED patient given: LR bolus x1, Rocephin 1000mg x1   83yM pmhx HTN, T2DM, CVA brought to ED by family for elevated blood glucose levels at home. Pt with recent admission at [facility] from 1/22/24 to 1/26/24 for management of MARCELO, dehydration, and urinary retention (florez removed prior to discharge). Pt was started on Januvia 50mg daily in addition to his metformin 750mg twice a day, which he has been adhering to. However, today pt noted elevated blood glucose levels in 500s with associated weakness and intermittent chills noted by family. Repeat blood glucose was still elevated in 500s after eating and drinking. Pt then took glipizide 10mg, which he was previously on. Pt also endorsed pain on urination and incomplete emptying when urinating for one day. Of note pt had florez placed on last admission that was removed prior to discharge. Denies current dysuria, abdominal pain, SOB, chest pain, subjective fevers, or chills at present.   ED course  Vitals: T 98.8F, , /57, RR 17, SpO2 99% on RA  Significant labs: H/H 10.1/28.8, Na 126, BUN 39, Cr 2.00, lactate 2.4, UA with cloudy urine, 100 protein, large leukocyte esterase, WBC too numerous to count, many bacteria, glucose >1000   Imaging:   - CT Head: No large territory acute infarct, intracranial hemorrhage, or mass effect. Stable chronic lacunar infarcts with bilateral microangiopathic white matter changes  - CT A/P: findings consistent with cystitis; no hydronephrosis or obstructing calculus  - CXR negative on wet read, f/u official read  EKG: sinus rhythm with 1st degree AV block, HR 86, QTc 473  In ED patient given: LR bolus x1, Rocephin 1000mg x1

## 2024-01-29 NOTE — H&P ADULT - NSHPPHYSICALEXAM_GEN_ALL_CORE
T(C): 37.1 (01-28-24 @ 16:54), Max: 37.1 (01-28-24 @ 16:54)  HR: 105 (01-28-24 @ 16:54) (105 - 105)  BP: 143/57 (01-28-24 @ 16:54) (143/57 - 143/57)  RR: 17 (01-28-24 @ 16:54) (17 - 17)  SpO2: 99% (01-28-24 @ 16:54) (99% - 99%)    GENERAL: patient appears tired but well, no acute distress, appropriately interactive  EYES: sclera clear, no exudates  ENMT: oropharynx clear without erythema, no exudates, moist mucous membranes  NECK: supple, soft  LUNGS: good air entry bilaterally, clear to auscultation, symmetric breath sounds, no wheezing or rhonchi appreciated  HEART: soft S1/S2, regular rate and rhythm, no murmurs noted, no lower extremity edema  GASTROINTESTINAL: L-sided systolic abdominal bruit noted, abdomen is soft, nontender, nondistended, normoactive bowel sounds, no suprapubic tenderness  INTEGUMENT: good skin turgor, warm skin, appears well perfused  MUSCULOSKELETAL: no clubbing or cyanosis, no obvious deformity  NEUROLOGIC: awake, alert, oriented x3, good muscle tone in all 4 extremities  HEME/LYMPH: no obvious ecchymosis or petechiae T(C): 37.1 (01-28-24 @ 16:54), Max: 37.1 (01-28-24 @ 16:54)  HR: 105 (01-28-24 @ 16:54) (105 - 105)  BP: 143/57 (01-28-24 @ 16:54) (143/57 - 143/57)  RR: 17 (01-28-24 @ 16:54) (17 - 17)  SpO2: 99% (01-28-24 @ 16:54) (99% - 99%)  GENERAL: patient appears tired but well, no acute distress, appropriately interactive  EYES: sclera clear, no exudates  ENMT: oropharynx clear without erythema, no exudates, moist mucous membranes  NECK: supple, soft  LUNGS: good air entry bilaterally, clear to auscultation, symmetric breath sounds, no wheezing or rhonchi appreciated  HEART: soft S1/S2, regular rate and rhythm, no murmurs noted, no lower extremity edema  GASTROINTESTINAL: L-sided systolic abdominal bruit noted, abdomen is soft, nontender, nondistended, normoactive bowel sounds, no suprapubic tenderness  INTEGUMENT: good skin turgor, warm skin, appears well perfused  MUSCULOSKELETAL: no clubbing or cyanosis, no obvious deformity  NEUROLOGIC: awake, alert, oriented x3, good muscle tone in all 4 extremities  HEME/LYMPH: no obvious ecchymosis or petechiae

## 2024-01-29 NOTE — PATIENT PROFILE ADULT - FUNCTIONAL ASSESSMENT - DAILY ACTIVITY SECTION LABEL
Called patient about lab testing. Unfortunately the urine dipstick was not able to be done this morning, pt was very understanding, says urine frequency symptoms have gotten a little better today.  Pt able to come back to clinic to give another urine sample for the POC urinalysis and will arrive around 4:20pm.   Basilio Alves MD .

## 2024-01-29 NOTE — H&P ADULT - PROBLEM SELECTOR PLAN 1
Pt with intermittent chills, dysuria, and sensation of incomplete bladder emptying on admission and recent indwelling florez, removed prior to this admission; of note pt with hx of kidney stones and urinary retention  - UA on admission with cloudy urine, 100 protein, large leukocyte esterase, WBC too numerous to count, many bacteria, glucose >1000   - CT A/P: cystitis with no hydronephrosis or obstructing calculus  - CXR negative on wet read, f/u official read  - Lactate 2.4 on admission  - In ED: LR bolus x1, Rocephin 1000mg x1  - Bladder scan negative on admission  - continue with Rocephin  - monitor Is and Os  - BCx x2 sent, f/u results  - UCx sent, f/u results  - F/u repeat lactate Pt with intermittent chills, dysuria, and sensation of incomplete bladder emptying on admission and recent indwelling florez, removed prior to this admission; of note pt with hx of kidney stones and urinary retention  - UA on admission with cloudy urine, 100 protein, large leukocyte esterase, WBC too numerous to count, many bacteria, glucose >1000   - CT A/P: cystitis with no hydronephrosis or obstructing calculus  - CXR negative on wet read, f/u official read  - Lactate 2.4 on admission, wnl on repeat   - In ED: LR bolus x1, Rocephin 1000mg x1  - Bladder scan negative on admission  - continue with Rocephin  - monitor Is and Os  - BCx x2 sent, f/u results  - UCx sent, f/u results  - F/u repeat lactate

## 2024-01-29 NOTE — PATIENT PROFILE ADULT - FUNCTIONAL ASSESSMENT - BASIC MOBILITY 6.
4-calculated by average/Not able to assess (calculate score using [facility] averaging method)  3-calculated by average/Not able to assess (calculate score using [facility] averaging method)

## 2024-01-29 NOTE — PATIENT PROFILE ADULT - FALL HARM RISK - HARM RISK INTERVENTIONS

## 2024-01-29 NOTE — H&P ADULT - PROBLEM SELECTOR PLAN 5
L sided abdominal bruit noted on physical exam on admission   - Former smoker  - Renal doppler to evaluate for renal artery aneurysm  - Nephrology consulted, f/u recs L sided abdominal bruit noted on physical exam on admission   - Former smoker  - Renal doppler to evaluate for renal artery aneurysm  - Nephrology consulted (Dr. Ruiz), f/u recs

## 2024-01-29 NOTE — CONSULT NOTE ADULT - SUBJECTIVE AND OBJECTIVE BOX
[facility], Division of Infectious Diseases  JOSE ALEJANDRO Hoyt S. Shah, Y. Patel, [facility]  [phone]    JOSE PHILLIPS  83y, Male  [id number]    HPI--  HPI:  83yM pmhx HTN, T2DM, CVA brought to ED by family for elevated blood glucose levels at home. Pt with recent admission at [facility] from 1/22/24 to 1/26/24 for management of MARCELO, dehydration, and urinary retention (florez removed prior to discharge). Pt was started on Januvia 50mg daily in addition to his metformin 750mg twice a day, which he has been adhering to. However, today pt noted elevated blood glucose levels in 500s with associated weakness and intermittent chills noted by family. Repeat blood glucose was still elevated in 500s after eating and drinking. Pt then took glipizide 10mg, which he was previously on. Pt also endorsed pain on urination and incomplete emptying when urinating for one day. Of note pt had florez placed on last admission that was removed prior to discharge. Denies current dysuria, abdominal pain, SOB, chest pain, subjective fevers, or chills at present.   ED course  Vitals: T 98.8F, , /57, RR 17, SpO2 99% on RA  Significant labs: H/H 10.1/28.8, Na 126, BUN 39, Cr 2.00, lactate 2.4, UA with cloudy urine, 100 protein, large leukocyte esterase, WBC too numerous to count, many bacteria, glucose >1000   Imaging:   - CT Head: No large territory acute infarct, intracranial hemorrhage, or mass effect. Stable chronic lacunar infarcts with bilateral microangiopathic white matter changes  - CT A/P: findings consistent with cystitis; no hydronephrosis or obstructing calculus  - CXR negative on wet read, f/u official read  EKG: sinus rhythm with 1st degree AV block, HR 86, QTc 473  In ED patient given: LR bolus x1, Rocephin 1000mg x1   (29 Jan 2024 00:49)    Pt seen at bedside  No complaints  Feeling ok      Active Medications--  amLODIPine   Tablet 5 milliGRAM(s) Oral daily  atorvastatin 20 milliGRAM(s) Oral at bedtime  cefTRIAXone   IVPB 2000 milliGRAM(s) IV Intermittent every 24 hours  dextrose 5%. 1000 milliLiter(s) IV Continuous <Continuous>  dextrose 5%. 1000 milliLiter(s) IV Continuous <Continuous>  dextrose 50% Injectable 25 Gram(s) IV Push once  dextrose 50% Injectable 12.5 Gram(s) IV Push once  dextrose 50% Injectable 25 Gram(s) IV Push once  dextrose Oral Gel 15 Gram(s) Oral once PRN  glucagon  Injectable 1 milliGRAM(s) IntraMuscular once  heparin   Injectable 5000 Unit(s) SubCutaneous every 8 hours  insulin lispro (ADMELOG) corrective regimen sliding scale   SubCutaneous at bedtime  insulin lispro (ADMELOG) corrective regimen sliding scale   SubCutaneous three times a day before meals  sodium chloride 0.9%. 1000 milliLiter(s) IV Continuous <Continuous>  tamsulosin 0.4 milliGRAM(s) Oral at bedtime    Antimicrobials:   cefTRIAXone   IVPB 2000 milliGRAM(s) IV Intermittent every 24 hours    Immunologic:     ROS:  unable to obtain    Allergies: No Known Allergies    PMH -- Diabetes    Hyperlipidemia    Kidney stone    CVA (cerebral vascular accident)      PSH -- H/O lithotripsy    S/P inguinal hernia repair    H/O hand surgery      FH -- Family history of prostate cancer (Father)      Social History --  EtOH: denies   Tobacco: denies   Drug Use: denies     Travel/Environmental/Occupational History:    Physical Exam--  Vital Signs Last 24 Hrs  T(F): 98 (29 Jan 2024 14:12), Max: 98.8 (28 Jan 2024 16:54)  HR: 89 (29 Jan 2024 14:12) (81 - 105)  BP: 108/62 (29 Jan 2024 14:12) (108/62 - 153/66)  RR: 19 (29 Jan 2024 14:12) (17 - 19)  SpO2: 98% (29 Jan 2024 14:12) (95% - 99%)  General: nontoxic-appearing, no acute distress  HEENT: NC/AT, EOMI,   Lungs: Clear bilaterally without rales, wheezing or rhonchi  Heart: Regular rate and rhythm. No murmur, rub or gallop.  Abdomen: Soft. Nondistended. Nontender.   Extremities: No cyanosis or clubbing. No edema.   Skin: Warm. Dry. Good turgor.     Laboratory & Imaging Data:  CBC:                       9.9    7.69  )-----------( 225      ( 29 Jan 2024 04:15 )             27.7     CMP: 01-29    135  |  102  |  33<H>  ----------------------------<  152<H>  3.6   |  25  |  1.40<H>    Ca    8.9      29 Jan 2024 04:15    TPro  6.5  /  Alb  3.1<L>  /  TBili  0.9  /  DBili  x   /  AST  23  /  ALT  35  /  AlkPhos  66  01-29    LIVER FUNCTIONS - ( 29 Jan 2024 04:15 )  Alb: 3.1 g/dL / Pro: 6.5 g/dL / ALK PHOS: 66 U/L / ALT: 35 U/L / AST: 23 U/L / GGT: x           Urinalysis Basic - ( 29 Jan 2024 04:15 )    Color: x / Appearance: x / SG: x / pH: x  Gluc: 152 mg/dL / Ketone: x  / Bili: x / Urobili: x   Blood: x / Protein: x / Nitrite: x   Leuk Esterase: x / RBC: x / WBC x   Sq Epi: x / Non Sq Epi: x / Bacteria: x        Microbiology: reviewed    Culture - Blood (collected 01-28-24 @ 18:50)  Source: .Blood Blood-Peripheral  Gram Stain (01-29-24 @ 12:10):    Growth in anaerobic bottle: Gram Negative Rods  Preliminary Report (01-29-24 @ 12:10):    Growth in anaerobic bottle: Gram Negative Rods    Culture - Blood (collected 01-28-24 @ 18:40)  Source: .Blood Blood-Peripheral  Gram Stain (01-29-24 @ 11:57):    Growth in aerobic bottle: Gram Negative Rods    Growth in anaerobic bottle: Gram Negative Rods  Preliminary Report (01-29-24 @ 11:57):    Growth in aerobic bottle: Gram Negative Rods    Growth in anaerobic bottle: Gram Negative Rods    Direct identification is available within approximately 3-5    hours either by Blood Panel Multiplexed PCR or Direct    MALDI-TOF. Details: [email or web address]  Organism: Blood Culture PCR (01-29-24 @ 11:38)  Organism: Blood Culture PCR (01-29-24 @ 11:38)      Method Type: PCR      -  Enterobacter (non-cloacae complex): Detec          Radiology: reviewed

## 2024-01-29 NOTE — CONSULT NOTE ADULT - SUBJECTIVE AND OBJECTIVE BOX
Patient is a 83y old  Male who presents with a chief complaint of UTI, MARCELO (29 Jan 2024 00:49)    HPI:  83yM pmhx HTN, T2DM, CVA brought to ED by family for elevated blood glucose levels at home. Pt with recent admission at [facility] from 1/22/24 to 1/26/24 for management of MARCELO, dehydration, and urinary retention (florez removed prior to discharge). Pt was started on Januvia 50mg daily in addition to his metformin 750mg twice a day, which he has been adhering to. However, today pt noted elevated blood glucose levels in 500s with associated weakness and intermittent chills noted by family. Repeat blood glucose was still elevated in 500s after eating and drinking. Pt then took glipizide 10mg, which he was previously on. Pt also endorsed pain on urination and incomplete emptying when urinating for one day. Of note pt had florez placed on last admission that was removed prior to discharge. Denies current dysuria, abdominal pain, SOB, chest pain, subjective fevers, or chills at present.   ED course  Vitals: T 98.8F, , /57, RR 17, SpO2 99% on RA  Significant labs: H/H 10.1/28.8, Na 126, BUN 39, Cr 2.00, lactate 2.4, UA with cloudy urine, 100 protein, large leukocyte esterase, WBC too numerous to count, many bacteria, glucose >1000   Imaging:   - CT Head: No large territory acute infarct, intracranial hemorrhage, or mass effect. Stable chronic lacunar infarcts with bilateral microangiopathic white matter changes  - CT A/P: findings consistent with cystitis; no hydronephrosis or obstructing calculus  - CXR negative on wet read, f/u official read  EKG: sinus rhythm with 1st degree AV block, HR 86, QTc 473  In ED patient given: LR bolus x1, Rocephin 1000mg x1   (29 Jan 2024 00:49)      PAST MEDICAL HISTORY:  Diabetes    Hyperlipidemia    Kidney stone    CVA (cerebral vascular accident)        PAST SURGICAL HISTORY:  H/O lithotripsy    S/P inguinal hernia repair    H/O hand surgery        FAMILY HISTORY:  Family history of prostate cancer (Father)        SOCIAL HISTORY:    Allergies    No Known Allergies    Intolerances      Home Medications:  amLODIPine 5 mg oral tablet: 1 tab(s) orally once a day (29 Jan 2024 01:08)  metFORMIN 750 mg oral tablet, extended release: 1 tab(s) orally 2 times a day (29 Jan 2024 01:08)  ramipril 5 mg oral capsule: 1 cap(s) orally once a day (29 Jan 2024 01:08)  rosuvastatin 5 mg oral tablet: 1 tab(s) orally once a day (29 Jan 2024 01:08)    MEDICATIONS  (STANDING):  amLODIPine   Tablet 5 milliGRAM(s) Oral daily  atorvastatin 20 milliGRAM(s) Oral at bedtime  cefTRIAXone   IVPB 1000 milliGRAM(s) IV Intermittent every 24 hours  dextrose 5%. 1000 milliLiter(s) (100 mL/Hr) IV Continuous <Continuous>  dextrose 5%. 1000 milliLiter(s) (50 mL/Hr) IV Continuous <Continuous>  dextrose 50% Injectable 25 Gram(s) IV Push once  dextrose 50% Injectable 12.5 Gram(s) IV Push once  dextrose 50% Injectable 25 Gram(s) IV Push once  glucagon  Injectable 1 milliGRAM(s) IntraMuscular once  heparin   Injectable 5000 Unit(s) SubCutaneous every 8 hours  insulin lispro (ADMELOG) corrective regimen sliding scale   SubCutaneous at bedtime  insulin lispro (ADMELOG) corrective regimen sliding scale   SubCutaneous three times a day before meals  sodium chloride 0.9%. 1000 milliLiter(s) (75 mL/Hr) IV Continuous <Continuous>  tamsulosin 0.4 milliGRAM(s) Oral at bedtime    MEDICATIONS  (PRN):  dextrose Oral Gel 15 Gram(s) Oral once PRN Blood Glucose LESS THAN 70 milliGRAM(s)/deciliter      REVIEW OF SYSTEMS:  General:   Respiratory: No cough, SOB  Cardiovascular: No CP or Palpitations	  Gastrointestinal: No nausea, Vomiting. No diarrhea  Genitourinary: No urinary complaints	  Musculoskeletal: No leg swelling, No new rash or lesions	  Neurological: 	  all other systems negative    T(F): 97.8 (01-29-24 @ 07:55), Max: 98.8 (01-28-24 @ 16:54)  HR: 81 (01-29-24 @ 07:55) (81 - 105)  BP: 153/66 (01-29-24 @ 07:55) (115/63 - 153/66)  RR: 18 (01-29-24 @ 07:55) (17 - 18)  SpO2: 97% (01-29-24 @ 07:55) (95% - 99%)  Wt(kg): --    PHYSICAL EXAM:  General: NAD  Respiratory: b/l air entry  Cardiovascular: S1 S2  Gastrointestinal: soft  Extremities: edema        01-29    135  |  102  |  33<H>  ----------------------------<  152<H>  3.6   |  25  |  1.40<H>    Ca    8.9      29 Jan 2024 04:15    TPro  6.5  /  Alb  3.1<L>  /  TBili  0.9  /  DBili  x   /  AST  23  /  ALT  35  /  AlkPhos  66  01-29                          9.9    7.69  )-----------( 225      ( 29 Jan 2024 04:15 )             27.7       Potassium: 3.6 mmol/L (01-29 @ 04:15)  Blood Urea Nitrogen: 33 mg/dL (01-29 @ 04:15)  Calcium: 8.9 mg/dL (01-29 @ 04:15)  Hemoglobin: 9.9 g/dL (01-29 @ 04:15)      Creatinine, Serum: 1.40 (01-29 @ 04:15)  Creatinine, Serum: 2.00 (01-28 @ 18:40)      Urinalysis Basic - ( 29 Jan 2024 04:15 )    Color: x / Appearance: x / SG: x / pH: x  Gluc: 152 mg/dL / Ketone: x  / Bili: x / Urobili: x   Blood: x / Protein: x / Nitrite: x   Leuk Esterase: x / RBC: x / WBC x   Sq Epi: x / Non Sq Epi: x / Bacteria: x      LIVER FUNCTIONS - ( 29 Jan 2024 04:15 )  Alb: 3.1 g/dL / Pro: 6.5 g/dL / ALK PHOS: 66 U/L / ALT: 35 U/L / AST: 23 U/L / GGT: x                       I&O's Detail           Patient is a 83y old  Male who presents with a chief complaint of UTI, MARCELO (29 Jan 2024 00:49)    HPI:  83yM pmhx HTN, T2DM, CVA brought to ED by family for elevated blood glucose levels at home. Pt with recent admission at [facility] from 1/22/24 to 1/26/24 for management of MARCELO, dehydration, and urinary retention (florez removed prior to discharge). Pt was started on Januvia 50mg daily in addition to his metformin 750mg twice a day, which he has been adhering to. However, today pt noted elevated blood glucose levels in 500s with associated weakness and intermittent chills noted by family. Repeat blood glucose was still elevated in 500s after eating and drinking. Pt then took glipizide 10mg, which he was previously on. Pt also endorsed pain on urination and incomplete emptying when urinating for one day. Of note pt had florez placed on last admission that was removed prior to discharge. Denies current dysuria, abdominal pain, SOB, chest pain, subjective fevers, or chills at present.   ED course  Vitals: T 98.8F, , /57, RR 17, SpO2 99% on RA  Significant labs: H/H 10.1/28.8, Na 126, BUN 39, Cr 2.00, lactate 2.4, UA with cloudy urine, 100 protein, large leukocyte esterase, WBC too numerous to count, many bacteria, glucose >1000   Imaging:   - CT Head: No large territory acute infarct, intracranial hemorrhage, or mass effect. Stable chronic lacunar infarcts with bilateral microangiopathic white matter changes  - CT A/P: findings consistent with cystitis; no hydronephrosis or obstructing calculus  - CXR negative on wet read, f/u official read  EKG: sinus rhythm with 1st degree AV block, HR 86, QTc 473  In ED patient given: LR bolus x1, Rocephin 1000mg x1   (29 Jan 2024 00:49)    Renal consult called for MARCELO. History obtained from chart and patient.       PAST MEDICAL HISTORY:  Diabetes    Hyperlipidemia    Kidney stone    CVA (cerebral vascular accident)        PAST SURGICAL HISTORY:  H/O lithotripsy    S/P inguinal hernia repair    H/O hand surgery        FAMILY HISTORY:  Family history of prostate cancer (Father)        SOCIAL HISTORY: No smoking or alcohol use     Allergies    No Known Allergies    Intolerances      Home Medications:  amLODIPine 5 mg oral tablet: 1 tab(s) orally once a day (29 Jan 2024 01:08)  metFORMIN 750 mg oral tablet, extended release: 1 tab(s) orally 2 times a day (29 Jan 2024 01:08)  ramipril 5 mg oral capsule: 1 cap(s) orally once a day (29 Jan 2024 01:08)  rosuvastatin 5 mg oral tablet: 1 tab(s) orally once a day (29 Jan 2024 01:08)    MEDICATIONS  (STANDING):  amLODIPine   Tablet 5 milliGRAM(s) Oral daily  atorvastatin 20 milliGRAM(s) Oral at bedtime  cefTRIAXone   IVPB 1000 milliGRAM(s) IV Intermittent every 24 hours  dextrose 5%. 1000 milliLiter(s) (100 mL/Hr) IV Continuous <Continuous>  dextrose 5%. 1000 milliLiter(s) (50 mL/Hr) IV Continuous <Continuous>  dextrose 50% Injectable 25 Gram(s) IV Push once  dextrose 50% Injectable 12.5 Gram(s) IV Push once  dextrose 50% Injectable 25 Gram(s) IV Push once  glucagon  Injectable 1 milliGRAM(s) IntraMuscular once  heparin   Injectable 5000 Unit(s) SubCutaneous every 8 hours  insulin lispro (ADMELOG) corrective regimen sliding scale   SubCutaneous at bedtime  insulin lispro (ADMELOG) corrective regimen sliding scale   SubCutaneous three times a day before meals  sodium chloride 0.9%. 1000 milliLiter(s) (75 mL/Hr) IV Continuous <Continuous>  tamsulosin 0.4 milliGRAM(s) Oral at bedtime    MEDICATIONS  (PRN):  dextrose Oral Gel 15 Gram(s) Oral once PRN Blood Glucose LESS THAN 70 milliGRAM(s)/deciliter      REVIEW OF SYSTEMS:  General: no distress  Respiratory: No cough, SOB  Cardiovascular: No CP or Palpitations	  Gastrointestinal: No nausea, Vomiting. No diarrhea  Genitourinary: No urinary complaints	  Musculoskeletal: No new rash or lesions	  all other systems negative    T(F): 97.8 (01-29-24 @ 07:55), Max: 98.8 (01-28-24 @ 16:54)  HR: 81 (01-29-24 @ 07:55) (81 - 105)  BP: 153/66 (01-29-24 @ 07:55) (115/63 - 153/66)  RR: 18 (01-29-24 @ 07:55) (17 - 18)  SpO2: 97% (01-29-24 @ 07:55) (95% - 99%)  Wt(kg): --    PHYSICAL EXAM:  General: NAD  Respiratory: b/l air entry  Cardiovascular: S1 S2  Gastrointestinal: soft  Extremities: no edema        01-29    135  |  102  |  33<H>  ----------------------------<  152<H>  3.6   |  25  |  1.40<H>    Ca    8.9      29 Jan 2024 04:15    TPro  6.5  /  Alb  3.1<L>  /  TBili  0.9  /  DBili  x   /  AST  23  /  ALT  35  /  AlkPhos  66  01-29                          9.9    7.69  )-----------( 225      ( 29 Jan 2024 04:15 )             27.7       Potassium: 3.6 mmol/L (01-29 @ 04:15)  Blood Urea Nitrogen: 33 mg/dL (01-29 @ 04:15)  Calcium: 8.9 mg/dL (01-29 @ 04:15)  Hemoglobin: 9.9 g/dL (01-29 @ 04:15)      Creatinine, Serum: 1.40 (01-29 @ 04:15)  Creatinine, Serum: 2.00 (01-28 @ 18:40)      Urinalysis Basic - ( 29 Jan 2024 04:15 )    Color: x / Appearance: x / SG: x / pH: x  Gluc: 152 mg/dL / Ketone: x  / Bili: x / Urobili: x   Blood: x / Protein: x / Nitrite: x   Leuk Esterase: x / RBC: x / WBC x   Sq Epi: x / Non Sq Epi: x / Bacteria: x      LIVER FUNCTIONS - ( 29 Jan 2024 04:15 )  Alb: 3.1 g/dL / Pro: 6.5 g/dL / ALK PHOS: 66 U/L / ALT: 35 U/L / AST: 23 U/L / GGT: x                   < from: CT Abdomen and Pelvis No Cont (01.28.24 @ 22:06) >    ACC: [id number] EXAM:  CT ABDOMEN AND PELVIS   ORDERED BY: TASH MELENDEZ     PROCEDURE DATE:  01/28/2024          INTERPRETATION:  CLINICAL INFORMATION: UTI, history of kidney stones    COMPARISON: None.    CONTRAST/COMPLICATIONS:  IV Contrast: NONE  Oral Contrast: NONE  Complications: None reported at time of study completion    PROCEDURE:  CT of the Abdomen and Pelvis was performed.  Sagittal and coronal reformats were performed.    FINDINGS:  LOWER CHEST: Bibasilar atelectasis. Coronary artery calcifications.    LIVER: Within normal limits.  BILE DUCTS: Normal caliber.  GALLBLADDER: Cholelithiasis.  SPLEEN: Within normal limits.  PANCREAS: Within normal limits.  ADRENALS: Within normal limits.  KIDNEYS/URETERS: Nonobstructing cluster ofcalculi in the left lower pole   measuring left lower pole cyst. No hydronephrosis on either side.    BLADDER: Circumferential mural thickening of the urinary bladder with   perivesical fat stranding and dependent intraluminal air  REPRODUCTIVE ORGANS: Prostate is enlarged.    BOWEL: No bowel obstruction. Appendix is normal.  PERITONEUM: No ascites.  VESSELS: Atherosclerotic changes.  RETROPERITONEUM/LYMPH NODES: No lymphadenopathy.  ABDOMINAL WALL: Within normal limits.  BONES: No acute osseous abnormality. Degenerative changes in the spine.    IMPRESSION:  Findings most consistent with cystitis, recommend correlation with   urinalysis.    No hydronephrosis or obstructing calculus.        --- End of Report ---            VENKATA KEBEDE MD; Attending Radiologist  This document has been electronically signed. Jan 28 2024 10:21PM    < end of copied text >  < from: Xray Chest 1 View AP/PA (01.28.24 @ 19:12) >    ACC: [id number] EXAM:  XR CHEST AP OR PA 1V   ORDERED BY: TASH MELENDEZ     PROCEDURE DATE:  01/28/2024          INTERPRETATION:  Sepsis.    AP chest. Prior 1/22/2024  Normal heart mediastinum. No consolidation or effusion.    IMPRESSION: No active disease    --- End of Report ---            HERMAN ARAIZA MD; Attending Radiologist  This document has been electronically signed. Jan 29 2024 10:03AM    < end of copied text >

## 2024-01-29 NOTE — H&P ADULT - ASSESSMENT
83yM pmhx HTN, T2DM, CVA brought to ED by family for elevated blood glucose levels at home with dysuria, intermittent chills. Admitted for UTI and MARCELO.

## 2024-01-29 NOTE — H&P ADULT - PROBLEM SELECTOR PLAN 2
Baseline Cr of 1.20 on last admission   - BUN 39, Cr 2.00 on admission  - Likely 2/2 UTI and decreased PO intake in setting of elevated blood glucose   - encourage PO intake   - Hold home ramipril   - Avoid nephrotoxic agents  - F/u AM BMP Baseline Cr of 1.20 on last admission   - BUN 39, Cr 2.00 on admission  - Likely 2/2 UTI and decreased PO intake in setting of elevated blood glucose   - encourage PO intake   - c/w maintenance NS at 75 cc/hr for 8 hours   - Hold home ramipril   - Avoid nephrotoxic agents  - F/u AM BMP

## 2024-01-29 NOTE — ED ADULT NURSE REASSESSMENT NOTE - NS ED NURSE REASSESS COMMENT FT1
(shift change) Pt sitting up in stretcher a&ox4, iv site dry and intact, no sings of infiltration, respiration even and unlabored, pt notes to be incontinent, pt cleaned, linins changed, pt repositioned in stretcher, ivf being adm, pt tolerating well, pt denies fever chills, chest pain or sob at this time. Varghese AGOSTO

## 2024-01-29 NOTE — H&P ADULT - PROBLEM SELECTOR PLAN 6
H/H 10.1/28.8 on admission   - Baseline Hgb around 12 on last admission  - F/u iron studies  - monitor hemodynamics

## 2024-01-30 DIAGNOSIS — R78.81 BACTEREMIA: ICD-10-CM

## 2024-01-30 LAB
ALBUMIN SERPL ELPH-MCNC: 2.9 G/DL — LOW (ref 3.3–5)
ALP SERPL-CCNC: 73 U/L — SIGNIFICANT CHANGE UP (ref 40–120)
ALT FLD-CCNC: 37 U/L — SIGNIFICANT CHANGE UP (ref 12–78)
ANION GAP SERPL CALC-SCNC: 9 MMOL/L — SIGNIFICANT CHANGE UP (ref 5–17)
AST SERPL-CCNC: 28 U/L — SIGNIFICANT CHANGE UP (ref 15–37)
BASOPHILS # BLD AUTO: 0.02 K/UL — SIGNIFICANT CHANGE UP (ref 0–0.2)
BASOPHILS NFR BLD AUTO: 0.3 % — SIGNIFICANT CHANGE UP (ref 0–2)
BILIRUB SERPL-MCNC: 0.5 MG/DL — SIGNIFICANT CHANGE UP (ref 0.2–1.2)
BUN SERPL-MCNC: 25 MG/DL — HIGH (ref 7–23)
CALCIUM SERPL-MCNC: 9.2 MG/DL — SIGNIFICANT CHANGE UP (ref 8.5–10.1)
CHLORIDE SERPL-SCNC: 105 MMOL/L — SIGNIFICANT CHANGE UP (ref 96–108)
CO2 SERPL-SCNC: 23 MMOL/L — SIGNIFICANT CHANGE UP (ref 22–31)
CREAT SERPL-MCNC: 1.2 MG/DL — SIGNIFICANT CHANGE UP (ref 0.5–1.3)
EGFR: 60 ML/MIN/1.73M2 — SIGNIFICANT CHANGE UP
EOSINOPHIL # BLD AUTO: 0.08 K/UL — SIGNIFICANT CHANGE UP (ref 0–0.5)
EOSINOPHIL NFR BLD AUTO: 1.3 % — SIGNIFICANT CHANGE UP (ref 0–6)
GLUCOSE SERPL-MCNC: 214 MG/DL — HIGH (ref 70–99)
HCT VFR BLD CALC: 29.9 % — LOW (ref 39–50)
HGB BLD-MCNC: 10.5 G/DL — LOW (ref 13–17)
IMM GRANULOCYTES NFR BLD AUTO: 0.6 % — SIGNIFICANT CHANGE UP (ref 0–0.9)
LYMPHOCYTES # BLD AUTO: 0.66 K/UL — LOW (ref 1–3.3)
LYMPHOCYTES # BLD AUTO: 10.4 % — LOW (ref 13–44)
MCHC RBC-ENTMCNC: 31.7 PG — SIGNIFICANT CHANGE UP (ref 27–34)
MCHC RBC-ENTMCNC: 35.1 GM/DL — SIGNIFICANT CHANGE UP (ref 32–36)
MCV RBC AUTO: 90.3 FL — SIGNIFICANT CHANGE UP (ref 80–100)
MONOCYTES # BLD AUTO: 0.86 K/UL — SIGNIFICANT CHANGE UP (ref 0–0.9)
MONOCYTES NFR BLD AUTO: 13.6 % — SIGNIFICANT CHANGE UP (ref 2–14)
NEUTROPHILS # BLD AUTO: 4.67 K/UL — SIGNIFICANT CHANGE UP (ref 1.8–7.4)
NEUTROPHILS NFR BLD AUTO: 73.8 % — SIGNIFICANT CHANGE UP (ref 43–77)
NRBC # BLD: 0 /100 WBCS — SIGNIFICANT CHANGE UP (ref 0–0)
PLATELET # BLD AUTO: 277 K/UL — SIGNIFICANT CHANGE UP (ref 150–400)
POTASSIUM SERPL-MCNC: 3.9 MMOL/L — SIGNIFICANT CHANGE UP (ref 3.5–5.3)
POTASSIUM SERPL-SCNC: 3.9 MMOL/L — SIGNIFICANT CHANGE UP (ref 3.5–5.3)
PROT SERPL-MCNC: 6.8 G/DL — SIGNIFICANT CHANGE UP (ref 6–8.3)
RBC # BLD: 3.31 M/UL — LOW (ref 4.2–5.8)
RBC # FLD: 11.7 % — SIGNIFICANT CHANGE UP (ref 10.3–14.5)
SODIUM SERPL-SCNC: 137 MMOL/L — SIGNIFICANT CHANGE UP (ref 135–145)
WBC # BLD: 6.33 K/UL — SIGNIFICANT CHANGE UP (ref 3.8–10.5)
WBC # FLD AUTO: 6.33 K/UL — SIGNIFICANT CHANGE UP (ref 3.8–10.5)

## 2024-01-30 PROCEDURE — 99233 SBSQ HOSP IP/OBS HIGH 50: CPT

## 2024-01-30 PROCEDURE — 99222 1ST HOSP IP/OBS MODERATE 55: CPT

## 2024-01-30 RX ORDER — INSULIN GLARGINE 100 [IU]/ML
10 INJECTION, SOLUTION SUBCUTANEOUS EVERY MORNING
Refills: 0 | Status: DISCONTINUED | OUTPATIENT
Start: 2024-01-31 | End: 2024-02-01

## 2024-01-30 RX ORDER — INSULIN LISPRO 100/ML
3 VIAL (ML) SUBCUTANEOUS
Refills: 0 | Status: DISCONTINUED | OUTPATIENT
Start: 2024-01-30 | End: 2024-02-01

## 2024-01-30 RX ORDER — INSULIN GLARGINE 100 [IU]/ML
5 INJECTION, SOLUTION SUBCUTANEOUS ONCE
Refills: 0 | Status: COMPLETED | OUTPATIENT
Start: 2024-01-30 | End: 2024-01-30

## 2024-01-30 RX ADMIN — HEPARIN SODIUM 5000 UNIT(S): 5000 INJECTION INTRAVENOUS; SUBCUTANEOUS at 06:19

## 2024-01-30 RX ADMIN — HEPARIN SODIUM 5000 UNIT(S): 5000 INJECTION INTRAVENOUS; SUBCUTANEOUS at 21:24

## 2024-01-30 RX ADMIN — Medication 1: at 08:48

## 2024-01-30 RX ADMIN — TAMSULOSIN HYDROCHLORIDE 0.4 MILLIGRAM(S): 0.4 CAPSULE ORAL at 21:24

## 2024-01-30 RX ADMIN — Medication 4: at 13:25

## 2024-01-30 RX ADMIN — Medication 3 UNIT(S): at 17:58

## 2024-01-30 RX ADMIN — ATORVASTATIN CALCIUM 20 MILLIGRAM(S): 80 TABLET, FILM COATED ORAL at 21:24

## 2024-01-30 RX ADMIN — AMLODIPINE BESYLATE 5 MILLIGRAM(S): 2.5 TABLET ORAL at 06:19

## 2024-01-30 RX ADMIN — Medication 3 UNIT(S): at 13:25

## 2024-01-30 RX ADMIN — INSULIN GLARGINE 5 UNIT(S): 100 INJECTION, SOLUTION SUBCUTANEOUS at 08:49

## 2024-01-30 RX ADMIN — CEFTRIAXONE 100 MILLIGRAM(S): 500 INJECTION, POWDER, FOR SOLUTION INTRAMUSCULAR; INTRAVENOUS at 20:24

## 2024-01-30 RX ADMIN — HEPARIN SODIUM 5000 UNIT(S): 5000 INJECTION INTRAVENOUS; SUBCUTANEOUS at 13:54

## 2024-01-30 RX ADMIN — Medication 2: at 17:57

## 2024-01-30 NOTE — CARE COORDINATION ASSESSMENT. - NSCAREPROVIDERS_GEN_ALL_CORE_FT
CARE PROVIDERS:  Accepting Physician: Suma Ludwig  Administration: Abdullahi Kay  Administration: Emanuel Jasso  Administration: Sukhi Elizabeth  Admitting: Suma Ludwig  Attending: Andrea Martinez  Case Management: Tacos Wiley  Consultant: Juan Jose Ruiz  Consultant: Weil, Patricia  Consultant: Clara Bradley  ED Attending: Amee Santos  ED Nurse: Abdullahi Barajas  Nurse: Chiquita Nayak  Nurse: Radhika James  Ordered: ADM, User  Ordered: Doctor, Unknown  Outpatient Provider: Juan Jose Ruiz  Override: Gisella Murray  Override: Armen Ceja  Override: Danie Cam  Override: Adelaida Feng  Override: Quincy Vázquez  PCA/Nursing Assistant: Nkechi Vale  Primary Team: Merrick Aly  Primary Team: Jaden Ceja  Primary Team: Andrea Martinez  Primary Team: Suma Ludwig  Registered Dietitian: Taylor Majano  : Kayleigh Topete  Team: [facility], Team

## 2024-01-30 NOTE — CONSULT NOTE ADULT - CONSULT REASON
UTI
MARCELO
83y A1C with Estimated Average Glucose Result: 7.2 % (01-23-24 @ 08:04)   diabetes mellitus uncontrolled type 2

## 2024-01-30 NOTE — CARE COORDINATION ASSESSMENT. - NS SW READMITTED REASON
1/22-1/26 admitted for FTT, MARCELO and dehydration  Now admitted for UTI/current reason for admission unrelated to previous admission

## 2024-01-30 NOTE — CARE COORDINATION ASSESSMENT. - OTHER PERTINENT REFERRAL INFORMATION
CM met with patient, son and spouse at bedside to explain role and transitions of care. Patient lives with spouse in a private condo with no steps to get in.  Wife is the caregiver.  No home care prior to admission ([facility] was not able to start).  Patient uses a RW for ambulation. Family will transport patient home when ready to be discharge.  Unclear needs at this time. CM explained  home care expectation, process, insurance provision and home safety with good understanding.  CM to make referral if appropriate. Patient and family verbalized understanding of plans after discharge and are in agreement.  Resource folder left at bedside.  All questions answered to the best of my abilities.  CM remains available throughout the hospital stay.

## 2024-01-30 NOTE — CONSULT NOTE ADULT - PROBLEM SELECTOR RECOMMENDATION 9
Type 2 A1c 7.2% adm UTI  Recommend endocrine-Perlman on consult  Lantus 5 units now- lantus 10 units in am; Lispro 3 units AC x3 and low corrective scale AC and HS  FU appt: TBA  DSC recommendations: return to home regimen and glucose monitoring  diabetes education provided  Diabetes support info and cell # [phone] given   Goal 100-180 mg/dL; 140-180 mg/dL in critical care areas

## 2024-01-30 NOTE — CONSULT NOTE ADULT - SUBJECTIVE AND OBJECTIVE BOX
Patient is a 83y old  Male who presents with a chief complaint of UTI, MARCELO (29 Jan 2024 15:47)    Type: DX year known complications Endocrine Last seen Rx home Hx DKA/HHS, Glucometer checks, needs, weight, diet, exercise  diabetes education provided  Hx ASCVD, CKD, HF    HPI:  83yM pmhx HTN, T2DM, CVA brought to ED by family for elevated blood glucose levels at home. Pt with recent admission at [facility] from 1/22/24 to 1/26/24 for management of MARCELO, dehydration, and urinary retention (florez removed prior to discharge). Pt was started on Januvia 50mg daily in addition to his metformin 750mg twice a day, which he has been adhering to. However, today pt noted elevated blood glucose levels in 500s with associated weakness and intermittent chills noted by family. Repeat blood glucose was still elevated in 500s after eating and drinking. Pt then took glipizide 10mg, which he was previously on. Pt also endorsed pain on urination and incomplete emptying when urinating for one day. Of note pt had florez placed on last admission that was removed prior to discharge. Denies current dysuria, abdominal pain, SOB, chest pain, subjective fevers, or chills at present.   ED course  Vitals: T 98.8F, , /57, RR 17, SpO2 99% on RA  Significant labs: H/H 10.1/28.8, Na 126, BUN 39, Cr 2.00, lactate 2.4, UA with cloudy urine, 100 protein, large leukocyte esterase, WBC too numerous to count, many bacteria, glucose >1000   Imaging:   - CT Head: No large territory acute infarct, intracranial hemorrhage, or mass effect. Stable chronic lacunar infarcts with bilateral microangiopathic white matter changes  - CT A/P: findings consistent with cystitis; no hydronephrosis or obstructing calculus  - CXR negative on wet read, f/u official read  EKG: sinus rhythm with 1st degree AV block, HR 86, QTc 473  In ED patient given: LR bolus x1, Rocephin 1000mg x1   (29 Jan 2024 00:49)      PAST MEDICAL & SURGICAL HISTORY:  Diabetes      Hyperlipidemia      Kidney stone      CVA (cerebral vascular accident)      H/O lithotripsy      S/P inguinal hernia repair      H/O hand surgery          Allergies    No Known Allergies    Intolerances        MEDICATIONS  (STANDING):  amLODIPine   Tablet 5 milliGRAM(s) Oral daily  atorvastatin 20 milliGRAM(s) Oral at bedtime  cefTRIAXone   IVPB 2000 milliGRAM(s) IV Intermittent every 24 hours  dextrose 5%. 1000 milliLiter(s) (100 mL/Hr) IV Continuous <Continuous>  dextrose 5%. 1000 milliLiter(s) (50 mL/Hr) IV Continuous <Continuous>  dextrose 50% Injectable 25 Gram(s) IV Push once  dextrose 50% Injectable 12.5 Gram(s) IV Push once  dextrose 50% Injectable 25 Gram(s) IV Push once  glucagon  Injectable 1 milliGRAM(s) IntraMuscular once  heparin   Injectable 5000 Unit(s) SubCutaneous every 8 hours  influenza  Vaccine (HIGH DOSE) 0.7 milliLiter(s) IntraMuscular once  insulin glargine Injectable (LANTUS) 5 Unit(s) SubCutaneous once  insulin lispro (ADMELOG) corrective regimen sliding scale   SubCutaneous at bedtime  insulin lispro (ADMELOG) corrective regimen sliding scale   SubCutaneous three times a day before meals  insulin lispro Injectable (ADMELOG) 3 Unit(s) SubCutaneous three times a day before meals  sodium chloride 0.9%. 1000 milliLiter(s) (50 mL/Hr) IV Continuous <Continuous>  tamsulosin 0.4 milliGRAM(s) Oral at bedtime

## 2024-01-30 NOTE — CONSULT NOTE ADULT - ASSESSMENT
83yM pmhx HTN, T2DM, CVA brought to ED by family for elevated blood glucose levels at home with dysuria, intermittent chills. Admitted for UTI and MARCELO.     Acute Pyelonephritis w/ GNR Bacteremia  MARCELO  -pt with intermittent chills, dysuria, and sensation of incomplete bladder emptying on admission and recent indwelling florez, removed prior to this admission; of note pt with hx of kidney stones and urinary retention  - UA on admission with cloudy urine, 100 protein, large leukocyte esterase, WBC too numerous to count, many bacteria, glucose >1000   - CT A/P: cystitis with no hydronephrosis or obstructing calculus  - CXR negative on wet read, f/u official read  Recommendations:   Increased ceftriaxone 2gm dose  F/u pending BCx susceptibilities   --repeat BCx ordered  F/u UCx  Trend temps/WBC  Further recs to follow    D/w Dr. Aly  Infectious Diseases will continue to follow. Please call with any questions.   Clara Bradley M.D.  [facility] Division of Infectious Diseases [phone]  For after 5 P.M. and weekends, please call [phone]      
Physical Exam:   Vital Signs Last 24 Hrs  T(C): 36.5 (30 Jan 2024 05:34), Max: 37.1 (29 Jan 2024 21:16)  T(F): 97.7 (30 Jan 2024 05:34), Max: 98.7 (29 Jan 2024 21:16)  HR: 89 (30 Jan 2024 05:34) (83 - 89)  BP: 144/71 (30 Jan 2024 05:34) (108/62 - 145/71)  BP(mean): --  RR: 18 (30 Jan 2024 05:34) (18 - 19)  SpO2: 96% (30 Jan 2024 05:34) (95% - 98%)    Parameters below as of 30 Jan 2024 05:34  Patient On (Oxygen Delivery Method): room air             CAPILLARY BLOOD GLUCOSE      POCT Blood Glucose.: 214 mg/dL (29 Jan 2024 21:12)  POCT Blood Glucose.: 249 mg/dL (29 Jan 2024 18:09)  POCT Blood Glucose.: 200 mg/dL (29 Jan 2024 11:13)      Cholesterol, Serum: 113 mg/dL (05.19.21 @ 08:36)     HDL Cholesterol, Serum: 22 mg/dL (05.19.21 @ 08:36)     LDL Cholesterol Calculated: 66 mg/dL (05.19.21 @ 08:36)     DIET: CC  >50%        
MARCELO: Prerenal azotemia  UTI, Cystitis  h/o Urinary retention, BPH  Hypertension  h/o Nephrolithiasis (F/u with Dr. Irving as out pt)  Diabetes, Diabetic nephropathy    Improving renal indices. IV abx, ID follow up. Gentle IV hydration. Monitor blood sugar levels. Insulin coverage as needed.   Monitor BP trend. Titrate BP meds as needed. Salt restriction. Will follow electrolytes and renal function trend.    Further recommendations pending clinical course. Thank you for the courtesy of this referral.

## 2024-01-31 LAB
-  AMOXICILLIN/CLAVULANIC ACID: SIGNIFICANT CHANGE UP
-  AMPICILLIN/SULBACTAM: SIGNIFICANT CHANGE UP
-  AMPICILLIN/SULBACTAM: SIGNIFICANT CHANGE UP
-  AMPICILLIN: SIGNIFICANT CHANGE UP
-  AMPICILLIN: SIGNIFICANT CHANGE UP
-  AZTREONAM: SIGNIFICANT CHANGE UP
-  AZTREONAM: SIGNIFICANT CHANGE UP
-  CEFAZOLIN: SIGNIFICANT CHANGE UP
-  CEFAZOLIN: SIGNIFICANT CHANGE UP
-  CEFEPIME: SIGNIFICANT CHANGE UP
-  CEFEPIME: SIGNIFICANT CHANGE UP
-  CEFOXITIN: SIGNIFICANT CHANGE UP
-  CEFOXITIN: SIGNIFICANT CHANGE UP
-  CEFTRIAXONE: SIGNIFICANT CHANGE UP
-  CEFTRIAXONE: SIGNIFICANT CHANGE UP
-  CIPROFLOXACIN: SIGNIFICANT CHANGE UP
-  CIPROFLOXACIN: SIGNIFICANT CHANGE UP
-  ERTAPENEM: SIGNIFICANT CHANGE UP
-  ERTAPENEM: SIGNIFICANT CHANGE UP
-  GENTAMICIN: SIGNIFICANT CHANGE UP
-  GENTAMICIN: SIGNIFICANT CHANGE UP
-  IMIPENEM: SIGNIFICANT CHANGE UP
-  IMIPENEM: SIGNIFICANT CHANGE UP
-  LEVOFLOXACIN: SIGNIFICANT CHANGE UP
-  LEVOFLOXACIN: SIGNIFICANT CHANGE UP
-  MEROPENEM: SIGNIFICANT CHANGE UP
-  MEROPENEM: SIGNIFICANT CHANGE UP
-  NITROFURANTOIN: SIGNIFICANT CHANGE UP
-  PIPERACILLIN/TAZOBACTAM: SIGNIFICANT CHANGE UP
-  PIPERACILLIN/TAZOBACTAM: SIGNIFICANT CHANGE UP
-  TOBRAMYCIN: SIGNIFICANT CHANGE UP
-  TOBRAMYCIN: SIGNIFICANT CHANGE UP
-  TRIMETHOPRIM/SULFAMETHOXAZOLE: SIGNIFICANT CHANGE UP
-  TRIMETHOPRIM/SULFAMETHOXAZOLE: SIGNIFICANT CHANGE UP
ANION GAP SERPL CALC-SCNC: 10 MMOL/L — SIGNIFICANT CHANGE UP (ref 5–17)
BASOPHILS # BLD AUTO: 0.03 K/UL — SIGNIFICANT CHANGE UP (ref 0–0.2)
BASOPHILS NFR BLD AUTO: 0.5 % — SIGNIFICANT CHANGE UP (ref 0–2)
BUN SERPL-MCNC: 24 MG/DL — HIGH (ref 7–23)
CALCIUM SERPL-MCNC: 9.2 MG/DL — SIGNIFICANT CHANGE UP (ref 8.5–10.1)
CHLORIDE SERPL-SCNC: 104 MMOL/L — SIGNIFICANT CHANGE UP (ref 96–108)
CO2 SERPL-SCNC: 22 MMOL/L — SIGNIFICANT CHANGE UP (ref 22–31)
CREAT SERPL-MCNC: 1.1 MG/DL — SIGNIFICANT CHANGE UP (ref 0.5–1.3)
CULTURE RESULTS: ABNORMAL
EGFR: 67 ML/MIN/1.73M2 — SIGNIFICANT CHANGE UP
EOSINOPHIL # BLD AUTO: 0.11 K/UL — SIGNIFICANT CHANGE UP (ref 0–0.5)
EOSINOPHIL NFR BLD AUTO: 1.8 % — SIGNIFICANT CHANGE UP (ref 0–6)
GLUCOSE SERPL-MCNC: 213 MG/DL — HIGH (ref 70–99)
HCT VFR BLD CALC: 32.8 % — LOW (ref 39–50)
HGB BLD-MCNC: 11.6 G/DL — LOW (ref 13–17)
IMM GRANULOCYTES NFR BLD AUTO: 0.7 % — SIGNIFICANT CHANGE UP (ref 0–0.9)
LYMPHOCYTES # BLD AUTO: 0.86 K/UL — LOW (ref 1–3.3)
LYMPHOCYTES # BLD AUTO: 14.5 % — SIGNIFICANT CHANGE UP (ref 13–44)
MCHC RBC-ENTMCNC: 31.8 PG — SIGNIFICANT CHANGE UP (ref 27–34)
MCHC RBC-ENTMCNC: 35.4 GM/DL — SIGNIFICANT CHANGE UP (ref 32–36)
MCV RBC AUTO: 89.9 FL — SIGNIFICANT CHANGE UP (ref 80–100)
METHOD TYPE: SIGNIFICANT CHANGE UP
METHOD TYPE: SIGNIFICANT CHANGE UP
MONOCYTES # BLD AUTO: 0.85 K/UL — SIGNIFICANT CHANGE UP (ref 0–0.9)
MONOCYTES NFR BLD AUTO: 14.3 % — HIGH (ref 2–14)
NEUTROPHILS # BLD AUTO: 4.06 K/UL — SIGNIFICANT CHANGE UP (ref 1.8–7.4)
NEUTROPHILS NFR BLD AUTO: 68.2 % — SIGNIFICANT CHANGE UP (ref 43–77)
NRBC # BLD: 0 /100 WBCS — SIGNIFICANT CHANGE UP (ref 0–0)
ORGANISM # SPEC MICROSCOPIC CNT: ABNORMAL
ORGANISM # SPEC MICROSCOPIC CNT: SIGNIFICANT CHANGE UP
ORGANISM # SPEC MICROSCOPIC CNT: SIGNIFICANT CHANGE UP
PLATELET # BLD AUTO: 282 K/UL — SIGNIFICANT CHANGE UP (ref 150–400)
POTASSIUM SERPL-MCNC: 4.1 MMOL/L — SIGNIFICANT CHANGE UP (ref 3.5–5.3)
POTASSIUM SERPL-SCNC: 4.1 MMOL/L — SIGNIFICANT CHANGE UP (ref 3.5–5.3)
RBC # BLD: 3.65 M/UL — LOW (ref 4.2–5.8)
RBC # FLD: 11.4 % — SIGNIFICANT CHANGE UP (ref 10.3–14.5)
SODIUM SERPL-SCNC: 136 MMOL/L — SIGNIFICANT CHANGE UP (ref 135–145)
SPECIMEN SOURCE: SIGNIFICANT CHANGE UP
WBC # BLD: 5.95 K/UL — SIGNIFICANT CHANGE UP (ref 3.8–10.5)
WBC # FLD AUTO: 5.95 K/UL — SIGNIFICANT CHANGE UP (ref 3.8–10.5)

## 2024-01-31 PROCEDURE — 99232 SBSQ HOSP IP/OBS MODERATE 35: CPT

## 2024-01-31 RX ORDER — PHENAZOPYRIDINE HCL 100 MG
100 TABLET ORAL EVERY 8 HOURS
Refills: 0 | Status: DISCONTINUED | OUTPATIENT
Start: 2024-01-31 | End: 2024-02-01

## 2024-01-31 RX ADMIN — HEPARIN SODIUM 5000 UNIT(S): 5000 INJECTION INTRAVENOUS; SUBCUTANEOUS at 05:18

## 2024-01-31 RX ADMIN — INSULIN GLARGINE 10 UNIT(S): 100 INJECTION, SOLUTION SUBCUTANEOUS at 08:37

## 2024-01-31 RX ADMIN — Medication 3 UNIT(S): at 12:51

## 2024-01-31 RX ADMIN — Medication 3 UNIT(S): at 08:37

## 2024-01-31 RX ADMIN — HEPARIN SODIUM 5000 UNIT(S): 5000 INJECTION INTRAVENOUS; SUBCUTANEOUS at 22:08

## 2024-01-31 RX ADMIN — TAMSULOSIN HYDROCHLORIDE 0.4 MILLIGRAM(S): 0.4 CAPSULE ORAL at 22:08

## 2024-01-31 RX ADMIN — Medication 3: at 17:54

## 2024-01-31 RX ADMIN — CEFTRIAXONE 100 MILLIGRAM(S): 500 INJECTION, POWDER, FOR SOLUTION INTRAMUSCULAR; INTRAVENOUS at 20:06

## 2024-01-31 RX ADMIN — Medication 2: at 08:36

## 2024-01-31 RX ADMIN — Medication 2: at 22:07

## 2024-01-31 RX ADMIN — Medication 4: at 12:50

## 2024-01-31 RX ADMIN — Medication 100 MILLIGRAM(S): at 23:30

## 2024-01-31 RX ADMIN — AMLODIPINE BESYLATE 5 MILLIGRAM(S): 2.5 TABLET ORAL at 05:18

## 2024-01-31 RX ADMIN — HEPARIN SODIUM 5000 UNIT(S): 5000 INJECTION INTRAVENOUS; SUBCUTANEOUS at 14:21

## 2024-01-31 RX ADMIN — Medication 3 UNIT(S): at 17:55

## 2024-01-31 RX ADMIN — ATORVASTATIN CALCIUM 20 MILLIGRAM(S): 80 TABLET, FILM COATED ORAL at 22:08

## 2024-01-31 NOTE — PROGRESS NOTE ADULT - PROBLEM SELECTOR PLAN 2
Baseline Cr of 1.20 on last admission   - BUN 39, Cr 2.00 on admission  - Likely 2/2 UTI and decreased PO intake in setting of elevated blood glucose   - encourage PO intake   - c/w maintenance NS at 75 cc/hr for 8 hours   - Hold home ramipril   - Avoid nephrotoxic agents  - F/u AM BMP
-Blood culture positive for enterobacter cloacae  -F/u repeat blood cx- NGTD  -Plan as above
-Blood culture positive for enterobacter cloacae  -F/u repeat blood cx  -Plan as above

## 2024-01-31 NOTE — PROGRESS NOTE ADULT - PROBLEM SELECTOR PLAN 6
H/H 10.1/28.8 on admission   - Baseline Hgb around 12 on last admission  - F/u iron studies  - monitor hemodynamics
L sided abdominal bruit noted on physical exam on admission   - Former smoker  - Renal doppler to evaluate for renal artery aneurysm  - Nephrology consulted (Dr. Ruiz), f/u recs
L sided abdominal bruit noted on physical exam on admission   - Former smoker  - can f/up as outpatient  - Nephrology consulted (Dr. Ruiz), f/u recs

## 2024-01-31 NOTE — PHYSICAL THERAPY INITIAL EVALUATION ADULT - ADDITIONAL COMMENTS
Patient resides in an apartment with his wife. There are no MILTON or inside. Patient has handi-cap accessible bathroom with walk-in shower. Patient was independent with all functional mobility with no AD, owns RW. Patient was independent for ADL's.

## 2024-01-31 NOTE — PROGRESS NOTE ADULT - PROBLEM SELECTOR PLAN 3
Baseline Cr of 1.20 on last admission   - BUN 39, Cr 2.00 on admission  - Likely 2/2 UTI and decreased PO intake in setting of elevated blood glucose   - encourage PO intake   - Hold home ramipril   - Avoid nephrotoxic agents  - F/u AM BMP- Cr 1.2 this AM, MARCELO RESOLVED
Chronic:  - VBG on admission without acidosis with normal anion gap  - Blood glucose on admission was 442, improving s/p LR bolus x1 in ED  - HA1c on last admission was 7.2   - Recently added Januvia 50mg daily on last admission, in addition to metformin 750mg BID pt was already taking  - Hold home Januvia and metformin  - c/w maintenance NS at 75 cc/hr for 8 hours   - Low dose ISS  - monitor finger sticks  - CC diet
Baseline Cr of 1.20 on last admission   - BUN 39, Cr 2.00 on admission  - Likely 2/2 UTI and decreased PO intake in setting of elevated blood glucose   - encourage PO intake   - Hold home ramipril   - Avoid nephrotoxic agents  - F/u AM BMP- Cr 1.1 this AM, MARCELO RESOLVED

## 2024-01-31 NOTE — PROGRESS NOTE ADULT - PROBLEM SELECTOR PLAN 5
Corrected Na 134 on admission  - Hx of hyponatremia   - S/p LR bolus x1 in ED
Corrected Na 134 on admission  - Hx of hyponatremia   - S/p LR bolus x1 in ED  - F/u AM BMP- Na 137
L sided abdominal bruit noted on physical exam on admission   - Former smoker  - Renal doppler to evaluate for renal artery aneurysm  - Nephrology consulted (Dr. Ruiz), f/u recs

## 2024-01-31 NOTE — PHYSICAL THERAPY INITIAL EVALUATION ADULT - PERTINENT HX OF CURRENT PROBLEM, REHAB EVAL
As pwe EMR, 83yM pmhx HTN, T2DM, CVA brought to ED by family for elevated blood glucose levels at home with dysuria, intermittent chills. Admitted for UTI and MARCELO.

## 2024-01-31 NOTE — PROGRESS NOTE ADULT - PROBLEM SELECTOR PLAN 8
Chronic:  - home rosuvastatin 5mg with therapeutic interchange
Chronic:  - continue home amlodipine  - hold home ramipril given MARCELO  - monitor BPs
Chronic:  - continue home amlodipine  - hold home ramipril given MARCELO  - monitor BPs

## 2024-01-31 NOTE — PROGRESS NOTE ADULT - PROBLEM SELECTOR PLAN 4
Corrected Na 134 on admission  - Hx of hyponatremia   - S/p LR bolus x1 in ED  - c/w maintenance NS at 75 cc/hr for 8 hours   - F/u AM BMP
Chronic:  - VBG on admission without acidosis with normal anion gap  - Blood glucose on admission was 442, improving s/p LR bolus x1 in ED  - HA1c on last admission was 7.2   - Recently added Januvia 50mg daily on last admission, in addition to metformin 750mg BID pt was already taking  - Hold home Januvia and metformin  - Low dose ISS  - monitor finger sticks  - CC diet  - Will have diabetes NP visit patient and family for medication education/adjustment
Chronic:  - VBG on admission without acidosis with normal anion gap  - Blood glucose on admission was 442, improving s/p LR bolus x1 in ED  - HA1c on last admission was 7.2   - Recently added Januvia 50mg daily on last admission, in addition to metformin 750mg BID pt was already taking  - Hold home Januvia and metformin  - Low dose ISS  - monitor finger sticks  - CC diet

## 2024-01-31 NOTE — PROGRESS NOTE ADULT - PROBLEM SELECTOR PLAN 9
Chronic:  - home rosuvastatin 5mg with therapeutic interchange
Chronic:  - continue home tamsulosin
Chronic:  - home rosuvastatin 5mg with therapeutic interchange

## 2024-01-31 NOTE — PROGRESS NOTE ADULT - PROBLEM SELECTOR PLAN 10
Chronic:  - continue home tamsulosin
DVT ppx:  - Heparin 5000u q8
Chronic:  - continue home tamsulosin

## 2024-01-31 NOTE — PROGRESS NOTE ADULT - NSPROGADDITIONALINFOA_GEN_ALL_CORE
plan of care discussed with son and spouse at bedside
plan of care discussed with daughter and spouse at bedside

## 2024-01-31 NOTE — PROGRESS NOTE ADULT - PROBLEM SELECTOR PLAN 7
H/H 10.1/28.8 on admission   - Baseline Hgb around 12 on last admission  - monitor hemodynamics
Chronic:  - continue home amlodipine  - hold home ramipril given MARCELO  - monitor BPs
H/H 10.1/28.8 on admission   - Baseline Hgb around 12 on last admission  - monitor hemodynamics

## 2024-01-31 NOTE — PROGRESS NOTE ADULT - PROBLEM SELECTOR PLAN 11
[facility] PHYSICAL TH[facility]AP[facility] - DAILY TREATMENT NOTE    Patient Name: Katerine Childers        Date: 2022  : 1967   yes Patient  Verified  Visit #:     Insurance: Payor: Walter Sullivan / Plan: [address] [place] [address] / Product Type: PPO /      In time: 1230 Out time: 125   Total Treatment Time: 55     Medicare/[facility] Time Tracking (below)   Total Timed Codes (min):  55 1:1 Treatment Time:  55     TREATMENT AREA =  Right ankle pain [M25.571]    SUBJECTIVE  Pain Level (on 0 to 10 scale):  0  / 10   Medication Changes/New allergies or changes in medical history, any new surgeries or procedures?    no  If yes, update Summary List   Subjective Functional Status/Changes:  []  No changes reported     See ie          OBJECTIVE        25 min Self Care: Brace use, sleeping position, activity modification, poc   Rationale:    increase ROM and increase strength to improve the patients ability to complete adls    Billed With/As:   [] TE   [] TA   [] Neuro   [] Self Care Patient Education: [x] Review HEP    [] Progressed/Changed HEP based on:   [] positioning   [] body mechanics   [] transfers   [] heat/ice application    [] other:      Other Objective/Functional Measures:    Demo verbal understanding to sc  See ie     Post Treatment Pain Level (on 0 to 10) scale:   0  / 10     ASSESSMENT  Assessment/Changes in Function:     See ie     []  See Progress Note/Recertification   Patient will continue to benefit from skilled PT services to modify and progress therapeutic interventions, address functional mobility deficits, address ROM deficits, address strength deficits, analyze and address soft tissue restrictions, analyze and cue movement patterns, analyze and modify body mechanics/ergonomics, assess and modify postural abnormalities, address imbalance/dizziness and instruct in home and community integration to attain remaining goals.    Progress toward goals / Updated goals:    See ie     PLAN  []  Upgrade activities
as tolerated yes Continue plan of care   []  Discharge due to :    []  Other:      Therapist: Wade Gutiérrez PT    Date: 5/12/2022 Time: 1:54 PM     Future Appointments   Date Time Provider Ki Rudd   5/13/2022 10:15 AM Brad Medrano, PT [facility]   6/7/2022  1:45 PM Chema Lin [facility]   6/10/2022  9:15 AM Brad Medrano, PT [facility]   6/13/2022 12:00 PM Brad Medrano, PT [facility]   6/15/2022 12:30 PM Brad Medrano, PT [facility]   6/20/2022 12:00 PM Brad Medrano, PT [facility]   6/22/2022 12:30 PM Jhon Jeffers, PT [facility]
DVT ppx:  - Heparin 5000u q8
DVT ppx:  - Heparin 5000u q8

## 2024-02-01 ENCOUNTER — TRANSCRIPTION ENCOUNTER (OUTPATIENT)
Age: 84
End: 2024-02-01

## 2024-02-01 VITALS
RESPIRATION RATE: 18 BRPM | OXYGEN SATURATION: 97 % | DIASTOLIC BLOOD PRESSURE: 69 MMHG | SYSTOLIC BLOOD PRESSURE: 115 MMHG | HEART RATE: 90 BPM | TEMPERATURE: 98 F

## 2024-02-01 LAB
ANION GAP SERPL CALC-SCNC: 9 MMOL/L — SIGNIFICANT CHANGE UP (ref 5–17)
BUN SERPL-MCNC: 27 MG/DL — HIGH (ref 7–23)
CALCIUM SERPL-MCNC: 9.5 MG/DL — SIGNIFICANT CHANGE UP (ref 8.5–10.1)
CHLORIDE SERPL-SCNC: 104 MMOL/L — SIGNIFICANT CHANGE UP (ref 96–108)
CO2 SERPL-SCNC: 23 MMOL/L — SIGNIFICANT CHANGE UP (ref 22–31)
CREAT SERPL-MCNC: 1.1 MG/DL — SIGNIFICANT CHANGE UP (ref 0.5–1.3)
EGFR: 67 ML/MIN/1.73M2 — SIGNIFICANT CHANGE UP
GLUCOSE SERPL-MCNC: 215 MG/DL — HIGH (ref 70–99)
HCT VFR BLD CALC: 32.9 % — LOW (ref 39–50)
HGB BLD-MCNC: 11.5 G/DL — LOW (ref 13–17)
MCHC RBC-ENTMCNC: 31.5 PG — SIGNIFICANT CHANGE UP (ref 27–34)
MCHC RBC-ENTMCNC: 35 GM/DL — SIGNIFICANT CHANGE UP (ref 32–36)
MCV RBC AUTO: 90.1 FL — SIGNIFICANT CHANGE UP (ref 80–100)
NRBC # BLD: 0 /100 WBCS — SIGNIFICANT CHANGE UP (ref 0–0)
PLATELET # BLD AUTO: 329 K/UL — SIGNIFICANT CHANGE UP (ref 150–400)
POTASSIUM SERPL-MCNC: 4.1 MMOL/L — SIGNIFICANT CHANGE UP (ref 3.5–5.3)
POTASSIUM SERPL-SCNC: 4.1 MMOL/L — SIGNIFICANT CHANGE UP (ref 3.5–5.3)
RBC # BLD: 3.65 M/UL — LOW (ref 4.2–5.8)
RBC # FLD: 11.6 % — SIGNIFICANT CHANGE UP (ref 10.3–14.5)
SODIUM SERPL-SCNC: 136 MMOL/L — SIGNIFICANT CHANGE UP (ref 135–145)
WBC # BLD: 6.18 K/UL — SIGNIFICANT CHANGE UP (ref 3.8–10.5)
WBC # FLD AUTO: 6.18 K/UL — SIGNIFICANT CHANGE UP (ref 3.8–10.5)

## 2024-02-01 PROCEDURE — 87186 SC STD MICRODIL/AGAR DIL: CPT

## 2024-02-01 PROCEDURE — 87150 DNA/RNA AMPLIFIED PROBE: CPT

## 2024-02-01 PROCEDURE — 71045 X-RAY EXAM CHEST 1 VIEW: CPT

## 2024-02-01 PROCEDURE — 83540 ASSAY OF IRON: CPT

## 2024-02-01 PROCEDURE — 96365 THER/PROPH/DIAG IV INF INIT: CPT

## 2024-02-01 PROCEDURE — 85610 PROTHROMBIN TIME: CPT

## 2024-02-01 PROCEDURE — 82009 KETONE BODYS QUAL: CPT

## 2024-02-01 PROCEDURE — 87637 SARSCOV2&INF A&B&RSV AMP PRB: CPT

## 2024-02-01 PROCEDURE — 83605 ASSAY OF LACTIC ACID: CPT

## 2024-02-01 PROCEDURE — 74176 CT ABD & PELVIS W/O CONTRAST: CPT | Mod: MA

## 2024-02-01 PROCEDURE — 87077 CULTURE AEROBIC IDENTIFY: CPT

## 2024-02-01 PROCEDURE — 85027 COMPLETE CBC AUTOMATED: CPT

## 2024-02-01 PROCEDURE — 85025 COMPLETE CBC W/AUTO DIFF WBC: CPT

## 2024-02-01 PROCEDURE — 96361 HYDRATE IV INFUSION ADD-ON: CPT

## 2024-02-01 PROCEDURE — 70450 CT HEAD/BRAIN W/O DYE: CPT | Mod: MA

## 2024-02-01 PROCEDURE — 80053 COMPREHEN METABOLIC PANEL: CPT

## 2024-02-01 PROCEDURE — 84484 ASSAY OF TROPONIN QUANT: CPT

## 2024-02-01 PROCEDURE — 82962 GLUCOSE BLOOD TEST: CPT

## 2024-02-01 PROCEDURE — 80048 BASIC METABOLIC PNL TOTAL CA: CPT

## 2024-02-01 PROCEDURE — 82728 ASSAY OF FERRITIN: CPT

## 2024-02-01 PROCEDURE — 87040 BLOOD CULTURE FOR BACTERIA: CPT

## 2024-02-01 PROCEDURE — 99285 EMERGENCY DEPT VISIT HI MDM: CPT | Mod: 25

## 2024-02-01 PROCEDURE — 96366 THER/PROPH/DIAG IV INF ADDON: CPT

## 2024-02-01 PROCEDURE — 82803 BLOOD GASES ANY COMBINATION: CPT

## 2024-02-01 PROCEDURE — 85730 THROMBOPLASTIN TIME PARTIAL: CPT

## 2024-02-01 PROCEDURE — 97162 PT EVAL MOD COMPLEX 30 MIN: CPT

## 2024-02-01 PROCEDURE — 99232 SBSQ HOSP IP/OBS MODERATE 35: CPT

## 2024-02-01 PROCEDURE — 81001 URINALYSIS AUTO W/SCOPE: CPT

## 2024-02-01 PROCEDURE — 87086 URINE CULTURE/COLONY COUNT: CPT

## 2024-02-01 PROCEDURE — 99239 HOSP IP/OBS DSCHRG MGMT >30: CPT

## 2024-02-01 PROCEDURE — 93005 ELECTROCARDIOGRAM TRACING: CPT

## 2024-02-01 PROCEDURE — 84466 ASSAY OF TRANSFERRIN: CPT

## 2024-02-01 PROCEDURE — 36415 COLL VENOUS BLD VENIPUNCTURE: CPT

## 2024-02-01 PROCEDURE — 83550 IRON BINDING TEST: CPT

## 2024-02-01 RX ORDER — LEVOFLOXACIN 5 MG/ML
1 INJECTION, SOLUTION INTRAVENOUS
Qty: 7 | Refills: 0
Start: 2024-02-01 | End: 2024-02-07

## 2024-02-01 RX ORDER — ISOPROPYL ALCOHOL, BENZOCAINE .7; .06 ML/ML; ML/ML
0 SWAB TOPICAL
Qty: 100 | Refills: 1
Start: 2024-02-01

## 2024-02-01 RX ORDER — INSULIN GLARGINE 100 [IU]/ML
10 INJECTION, SOLUTION SUBCUTANEOUS
Qty: 5 | Refills: 0
Start: 2024-02-01 | End: 2024-03-01

## 2024-02-01 RX ORDER — PHENAZOPYRIDINE HCL 100 MG
1 TABLET ORAL
Qty: 6 | Refills: 0
Start: 2024-02-01 | End: 2024-02-02

## 2024-02-01 RX ORDER — LACTOBACILLUS ACIDOPHILUS 100MM CELL
1 CAPSULE ORAL
Qty: 14 | Refills: 0
Start: 2024-02-01 | End: 2024-02-07

## 2024-02-01 RX ADMIN — HEPARIN SODIUM 5000 UNIT(S): 5000 INJECTION INTRAVENOUS; SUBCUTANEOUS at 12:55

## 2024-02-01 RX ADMIN — HEPARIN SODIUM 5000 UNIT(S): 5000 INJECTION INTRAVENOUS; SUBCUTANEOUS at 05:46

## 2024-02-01 RX ADMIN — Medication 2: at 08:56

## 2024-02-01 RX ADMIN — Medication 2: at 12:54

## 2024-02-01 RX ADMIN — Medication 100 MILLIGRAM(S): at 05:46

## 2024-02-01 RX ADMIN — AMLODIPINE BESYLATE 5 MILLIGRAM(S): 2.5 TABLET ORAL at 05:46

## 2024-02-01 RX ADMIN — Medication 3 UNIT(S): at 08:57

## 2024-02-01 RX ADMIN — INSULIN GLARGINE 10 UNIT(S): 100 INJECTION, SOLUTION SUBCUTANEOUS at 08:56

## 2024-02-01 RX ADMIN — Medication 3 UNIT(S): at 12:54

## 2024-02-01 NOTE — DISCHARGE NOTE PROVIDER - CARE PROVIDER_API CALL
Becky Call, [facility]  [address] [place] [address]  Phone: [phone]  Fax: [phone]  Follow Up Time:

## 2024-02-01 NOTE — DISCHARGE NOTE PROVIDER - NSDCMRMEDTOKEN_GEN_ALL_CORE_FT
alcohol swabs: Apply topically to affected area 4 times a day  amLODIPine 5 mg oral tablet: 1 tab(s) orally once a day  glucometer (per patient&#x27;s insurance): Test blood sugars four times a day. Dispense #1 glucometer.  Insulin Pen Needles, 4mm: 1 application subcutaneously 4 times a day. ** Use with insulin pen **  Januvia 50 mg oral tablet: 1 tab(s) orally once a day  lactobacillus acidophilus oral tablet: 1 tab(s) orally 2 times a day (with meals)  lancets: 1 application subcutaneously 4 times a day  Lantus Solostar Pen 100 units/mL subcutaneous solution: 10 unit(s) subcutaneous once a day 10 unit(s) subcutaneous once a day  levoFLOXacin 750 mg oral tablet: 1 tab(s) orally once a day  metFORMIN 750 mg oral tablet, extended release: 1 tab(s) orally 2 times a day  phenazopyridine 100 mg oral tablet: 1 tab(s) orally every 8 hours  ramipril 5 mg oral capsule: 1 cap(s) orally once a day  rosuvastatin 5 mg oral tablet: 1 tab(s) orally once a day  tamsulosin 0.4 mg oral capsule: 1 cap(s) orally once a day (at bedtime)  test strips (per patient&#x27;s insurance): 1 application subcutaneously 4 times a day. ** Compatible with patient&#x27;s glucometer **

## 2024-02-01 NOTE — PROGRESS NOTE ADULT - SUBJECTIVE AND OBJECTIVE BOX
[facility], Division of Infectious Diseases  JOSE ALEJANDRO Hoyt Y. Patel, S. Shah, G. [facility]  [phone]    Name: JOSE PHILLIPS  Age: 83y  Gender: Male  MRN: [id number]    Interval History:  Patient seen and examined at bedside  No acute overnight events.   Notes reviewed    Antibiotics:  cefTRIAXone   IVPB 2000 milliGRAM(s) IV Intermittent every 24 hours      Medications:  amLODIPine   Tablet 5 milliGRAM(s) Oral daily  atorvastatin 20 milliGRAM(s) Oral at bedtime  cefTRIAXone   IVPB 2000 milliGRAM(s) IV Intermittent every 24 hours  dextrose 5%. 1000 milliLiter(s) IV Continuous <Continuous>  dextrose 5%. 1000 milliLiter(s) IV Continuous <Continuous>  dextrose 50% Injectable 25 Gram(s) IV Push once  dextrose 50% Injectable 12.5 Gram(s) IV Push once  dextrose 50% Injectable 25 Gram(s) IV Push once  dextrose Oral Gel 15 Gram(s) Oral once PRN  glucagon  Injectable 1 milliGRAM(s) IntraMuscular once  heparin   Injectable 5000 Unit(s) SubCutaneous every 8 hours  influenza  Vaccine (HIGH DOSE) 0.7 milliLiter(s) IntraMuscular once  insulin glargine Injectable (LANTUS) 10 Unit(s) SubCutaneous every morning  insulin lispro (ADMELOG) corrective regimen sliding scale   SubCutaneous at bedtime  insulin lispro (ADMELOG) corrective regimen sliding scale   SubCutaneous three times a day before meals  insulin lispro Injectable (ADMELOG) 3 Unit(s) SubCutaneous three times a day before meals  phenazopyridine 100 milliGRAM(s) Oral every 8 hours  sodium chloride 0.9%. 1000 milliLiter(s) IV Continuous <Continuous>  tamsulosin 0.4 milliGRAM(s) Oral at bedtime      Review of Systems:  Review of systems otherwise negative except as previously noted.    Allergies: No Known Allergies    For details regarding the patient's past medical history, social history, family history, and other miscellaneous elements, please refer the initial infectious diseases consultation and/or the admitting history and physical examination for this admission.    Objective:  Vitals:   T(C): 36.6 (02-01-24 @ 12:31), Max: 36.9 (02-01-24 @ 04:29)  HR: 90 (02-01-24 @ 12:31) (77 - 90)  BP: 115/69 (02-01-24 @ 12:31) (108/61 - 123/54)  RR: 18 (02-01-24 @ 12:31) (18 - 18)  SpO2: 97% (02-01-24 @ 12:31) (93% - 98%)    Physical Examination:  General: no acute distress  HEENT: NC/AT, EOMI,  Cardio: S1, S2 heard, RRR, no murmurs  Resp: decreased b/l breath sounds  Abd: soft, NT, ND,  Ext: no edema or cyanosis  Skin: warm, dry, no visible rash      Laboratory Studies:  CBC:                       11.5   6.18  )-----------( 329      ( 01 Feb 2024 08:25 )             32.9     CMP: 02-01    136  |  104  |  27<H>  ----------------------------<  215<H>  4.1   |  23  |  1.10    Ca    9.5      01 Feb 2024 08:25        Urinalysis Basic - ( 01 Feb 2024 08:25 )    Color: x / Appearance: x / SG: x / pH: x  Gluc: 215 mg/dL / Ketone: x  / Bili: x / Urobili: x   Blood: x / Protein: x / Nitrite: x   Leuk Esterase: x / RBC: x / WBC x   Sq Epi: x / Non Sq Epi: x / Bacteria: x        Microbiology: reviewed    Culture - Blood (collected 01-29-24 @ 16:25)  Source: .Blood Blood  Preliminary Report (02-01-24 @ 02:02):    No growth at 48 Hours    Culture - Blood (collected 01-29-24 @ 16:20)  Source: .Blood Blood  Preliminary Report (02-01-24 @ 02:02):    No growth at 48 Hours    Culture - Urine (collected 01-28-24 @ 20:15)  Source: Clean Catch Clean Catch (Midstream)  Final Report (01-31-24 @ 07:54):    >100,000 CFU/ml Enterobacter cloacae complex  Organism: Enterobacter cloacae complex (01-31-24 @ 07:54)  Organism: Enterobacter cloacae complex (01-31-24 @ 07:54)      -  Levofloxacin: S <=0.5      -  Tobramycin: S <=2      -  Nitrofurantoin: I 64 Should not be used to treat pyelonephritis      -  Aztreonam: S <=4      -  Gentamicin: S <=2      -  Cefazolin: R 8      -  Cefepime: S <=2      -  Piperacillin/Tazobactam: S <=8      -  Ciprofloxacin: S <=0.25      -  Imipenem: S <=1      -  Ceftriaxone: S <=1 Enterobacter cloacae, Klebsiella aerogenes, and Citrobacter freundii may develop resistance during prolonged therapy.      -  Ampicillin: R <=8 These ampicillin results predict results for amoxicillin      Method Type: JONH      -  Meropenem: S <=1      -  Ampicillin/Sulbactam: R <=4/2      -  Cefoxitin: R <=8      -  Amoxicillin/Clavulanic Acid: R <=8/4      -  Trimethoprim/Sulfamethoxazole: S <=0.5/9.5      -  Ertapenem: S <=0.5    Culture - Blood (collected 01-28-24 @ 18:50)  Source: .Blood Blood-Peripheral  Gram Stain (01-29-24 @ 12:10):    Growth in anaerobic bottle: Gram Negative Rods  Final Report (01-31-24 @ 07:38):    Growth in anaerobic bottle: Enterobacter cloacae complex    See previous culture [id number]    Culture - Blood (collected 01-28-24 @ 18:40)  Source: .Blood Blood-Peripheral  Gram Stain (01-29-24 @ 11:57):    Growth in aerobic bottle: Gram Negative Rods    Growth in anaerobic bottle: Gram Negative Rods  Final Report (01-31-24 @ 20:20):    Growth in aerobic and anaerobic bottles: Enterobacter cloacae complex    Direct identification is available within approximately 3-5    hours either by Blood Panel Multiplexed PCR or Direct    MALDI-TOF. Details: [email or web address]  Organism: Blood Culture PCR  Enterobacter cloacae complex (01-31-24 @ 20:20)  Organism: Enterobacter cloacae complex (01-31-24 @ 20:20)      -  Levofloxacin: S <=0.5      -  Tobramycin: S <=2      -  Aztreonam: S <=4      -  Gentamicin: S <=2      -  Cefazolin: R 8      -  Cefepime: S <=2      -  Piperacillin/Tazobactam: S <=8      -  Ciprofloxacin: S <=0.25      -  Imipenem: S <=1      -  Ceftriaxone: S <=1 Enterobacter cloacae, Klebsiella aerogenes, and Citrobacter freundii may develop resistance during prolonged therapy.      -  Ampicillin: R <=8 These ampicillin results predict results for amoxicillin      Method Type: JONH      -  Meropenem: S <=1      -  Ampicillin/Sulbactam: R <=4/2      -  Cefoxitin: R <=8      -  Trimethoprim/Sulfamethoxazole: S <=0.5/9.5      -  Ertapenem: S <=0.5  Organism: Blood Culture PCR (01-31-24 @ 20:20)      -  Enterobacter (non-cloacae complex): Detec      Method Type: PCR          Radiology: reviewed      
[facility], Division of Infectious Diseases  JOSE ALEJANDRO Hoyt Y. Patel, S. Shah, G. [facility]  [phone]    Name: JOSE PHILLIPS  Age: 83y  Gender: Male  MRN: [id number]    Interval History:  Patient seen and examined at bedside  No acute overnight events. Afebrile  No complaints  Notes reviewed    Antibiotics:  cefTRIAXone   IVPB 2000 milliGRAM(s) IV Intermittent every 24 hours      Medications:  amLODIPine   Tablet 5 milliGRAM(s) Oral daily  atorvastatin 20 milliGRAM(s) Oral at bedtime  cefTRIAXone   IVPB 2000 milliGRAM(s) IV Intermittent every 24 hours  dextrose 5%. 1000 milliLiter(s) IV Continuous <Continuous>  dextrose 5%. 1000 milliLiter(s) IV Continuous <Continuous>  dextrose 50% Injectable 25 Gram(s) IV Push once  dextrose 50% Injectable 12.5 Gram(s) IV Push once  dextrose 50% Injectable 25 Gram(s) IV Push once  dextrose Oral Gel 15 Gram(s) Oral once PRN  glucagon  Injectable 1 milliGRAM(s) IntraMuscular once  heparin   Injectable 5000 Unit(s) SubCutaneous every 8 hours  influenza  Vaccine (HIGH DOSE) 0.7 milliLiter(s) IntraMuscular once  insulin glargine Injectable (LANTUS) 10 Unit(s) SubCutaneous every morning  insulin lispro (ADMELOG) corrective regimen sliding scale   SubCutaneous at bedtime  insulin lispro (ADMELOG) corrective regimen sliding scale   SubCutaneous three times a day before meals  insulin lispro Injectable (ADMELOG) 3 Unit(s) SubCutaneous three times a day before meals  sodium chloride 0.9%. 1000 milliLiter(s) IV Continuous <Continuous>  tamsulosin 0.4 milliGRAM(s) Oral at bedtime      Review of Systems:  A 10-point review of systems was obtained.   Review of systems otherwise negative except as previously noted.    Allergies: No Known Allergies    For details regarding the patient's past medical history, social history, family history, and other miscellaneous elements, please refer the initial infectious diseases consultation and/or the admitting history and physical examination for this admission.    Objective:  Vitals:   T(C): 37.2 (01-31-24 @ 12:07), Max: 37.2 (01-31-24 @ 12:07)  HR: 80 (01-31-24 @ 12:07) (74 - 83)  BP: 154/70 (01-31-24 @ 12:07) (117/65 - 154/70)  RR: 18 (01-31-24 @ 12:07) (18 - 18)  SpO2: 96% (01-31-24 @ 12:07) (95% - 96%)    Physical Examination:  General: no acute distress  HEENT: NC/AT, EOMI,   Cardio: S1, S2 heard, RRR, no murmurs  Resp: breath sounds heard bilaterally, no rales, wheezes or rhonchi  Abd: soft, NT, ND  Ext: no edema or cyanosis  Skin: warm, dry, no visible rash      Laboratory Studies:  CBC:                       11.6   5.95  )-----------( 282      ( 31 Jan 2024 07:40 )             32.8     CMP: 01-31    136  |  104  |  24<H>  ----------------------------<  213<H>  4.1   |  22  |  1.10    Ca    9.2      31 Jan 2024 07:40    TPro  6.8  /  Alb  2.9<L>  /  TBili  0.5  /  DBili  x   /  AST  28  /  ALT  37  /  AlkPhos  73  01-30    LIVER FUNCTIONS - ( 30 Jan 2024 09:45 )  Alb: 2.9 g/dL / Pro: 6.8 g/dL / ALK PHOS: 73 U/L / ALT: 37 U/L / AST: 28 U/L / GGT: x           Urinalysis Basic - ( 31 Jan 2024 07:40 )    Color: x / Appearance: x / SG: x / pH: x  Gluc: 213 mg/dL / Ketone: x  / Bili: x / Urobili: x   Blood: x / Protein: x / Nitrite: x   Leuk Esterase: x / RBC: x / WBC x   Sq Epi: x / Non Sq Epi: x / Bacteria: x        Microbiology: reviewed    Culture - Blood (collected 01-29-24 @ 16:25)  Source: .Blood Blood  Preliminary Report (01-31-24 @ 02:03):    No growth at 24 hours    Culture - Blood (collected 01-29-24 @ 16:20)  Source: .Blood Blood  Preliminary Report (01-31-24 @ 02:03):    No growth at 24 hours    Culture - Urine (collected 01-28-24 @ 20:15)  Source: Clean Catch Clean Catch (Midstream)  Final Report (01-31-24 @ 07:54):    >100,000 CFU/ml Enterobacter cloacae complex  Organism: Enterobacter cloacae complex (01-31-24 @ 07:54)  Organism: Enterobacter cloacae complex (01-31-24 @ 07:54)      Method Type: JONH      -  Amoxicillin/Clavulanic Acid: R <=8/4      -  Ampicillin: R <=8 These ampicillin results predict results for amoxicillin      -  Ampicillin/Sulbactam: R <=4/2      -  Aztreonam: S <=4      -  Cefazolin: R 8      -  Cefepime: S <=2      -  Cefoxitin: R <=8      -  Ceftriaxone: S <=1 Enterobacter cloacae, Klebsiella aerogenes, and Citrobacter freundii may develop resistance during prolonged therapy.      -  Ciprofloxacin: S <=0.25      -  Ertapenem: S <=0.5      -  Gentamicin: S <=2      -  Imipenem: S <=1      -  Levofloxacin: S <=0.5      -  Meropenem: S <=1      -  Nitrofurantoin: I 64 Should not be used to treat pyelonephritis      -  Piperacillin/Tazobactam: S <=8      -  Tobramycin: S <=2      -  Trimethoprim/Sulfamethoxazole: S <=0.5/9.5    Culture - Blood (collected 01-28-24 @ 18:50)  Source: .Blood Blood-Peripheral  Gram Stain (01-29-24 @ 12:10):    Growth in anaerobic bottle: Gram Negative Rods  Final Report (01-31-24 @ 07:38):    Growth in anaerobic bottle: Enterobacter cloacae complex    See previous culture [id number]    Culture - Blood (collected 01-28-24 @ 18:40)  Source: .Blood Blood-Peripheral  Gram Stain (01-29-24 @ 11:57):    Growth in aerobic bottle: Gram Negative Rods    Growth in anaerobic bottle: Gram Negative Rods  Preliminary Report (01-30-24 @ 11:59):    Growth in aerobic and anaerobic bottles: Enterobacter cloacae complex    Direct identification is available within approximately 3-5    hours either by Blood Panel Multiplexed PCR or Direct    MALDI-TOF. Details: [email or web address]  Organism: Blood Culture PCR  Enterobacter cloacae complex (01-31-24 @ 07:36)  Organism: Enterobacter cloacae complex (01-31-24 @ 07:36)      Method Type: JONH      -  Ampicillin: R <=8 These ampicillin results predict results for amoxicillin      -  Ampicillin/Sulbactam: R <=4/2      -  Aztreonam: S <=4      -  Cefazolin: R 8      -  Cefepime: S <=2      -  Cefoxitin: R <=8      -  Ceftriaxone: S <=1 Enterobacter cloacae, Klebsiella aerogenes, and Citrobacter freundii may develop resistance during prolonged therapy.      -  Ciprofloxacin: S <=0.25      -  Ertapenem: S <=0.5      -  Gentamicin: S <=2      -  Imipenem: S <=1      -  Levofloxacin: S <=0.5      -  Meropenem: S <=1      -  Piperacillin/Tazobactam: S <=8      -  Tobramycin: S <=2      -  Trimethoprim/Sulfamethoxazole: S <=0.5/9.5  Organism: Blood Culture PCR (01-29-24 @ 11:38)      Method Type: PCR      -  Enterobacter (non-cloacae complex): Detec          Radiology: reviewed      
[facility], Division of Infectious Diseases  JOSE ALEJANDRO Hoyt Y. Patel, S. Shah, G. [facility]  [phone]    Name: JOSE PHILLIPS  Age: 83y  Gender: Male  MRN: [id number]    Interval History:  Patient seen and examined at bedside  No acute overnight events. Afebrile  No complaints  Notes reviewed    Antibiotics:  cefTRIAXone   IVPB 2000 milliGRAM(s) IV Intermittent every 24 hours      Medications:  amLODIPine   Tablet 5 milliGRAM(s) Oral daily  atorvastatin 20 milliGRAM(s) Oral at bedtime  cefTRIAXone   IVPB 2000 milliGRAM(s) IV Intermittent every 24 hours  dextrose 5%. 1000 milliLiter(s) IV Continuous <Continuous>  dextrose 5%. 1000 milliLiter(s) IV Continuous <Continuous>  dextrose 50% Injectable 25 Gram(s) IV Push once  dextrose 50% Injectable 12.5 Gram(s) IV Push once  dextrose 50% Injectable 25 Gram(s) IV Push once  dextrose Oral Gel 15 Gram(s) Oral once PRN  glucagon  Injectable 1 milliGRAM(s) IntraMuscular once  heparin   Injectable 5000 Unit(s) SubCutaneous every 8 hours  influenza  Vaccine (HIGH DOSE) 0.7 milliLiter(s) IntraMuscular once  insulin lispro (ADMELOG) corrective regimen sliding scale   SubCutaneous three times a day before meals  insulin lispro (ADMELOG) corrective regimen sliding scale   SubCutaneous at bedtime  insulin lispro Injectable (ADMELOG) 3 Unit(s) SubCutaneous three times a day before meals  sodium chloride 0.9%. 1000 milliLiter(s) IV Continuous <Continuous>  tamsulosin 0.4 milliGRAM(s) Oral at bedtime      Review of Systems:  Review of systems otherwise negative except as previously noted.    Allergies: No Known Allergies    For details regarding the patient's past medical history, social history, family history, and other miscellaneous elements, please refer the initial infectious diseases consultation and/or the admitting history and physical examination for this admission.    Objective:  Vitals:   T(C): 36.7 (01-30-24 @ 14:15), Max: 37.1 (01-29-24 @ 21:16)  HR: 83 (01-30-24 @ 14:15) (83 - 89)  BP: 117/65 (01-30-24 @ 14:15) (117/65 - 145/71)  RR: 18 (01-30-24 @ 14:15) (18 - 18)  SpO2: 96% (01-30-24 @ 14:15) (96% - 96%)    Physical Examination:  General: no acute distress  HEENT: NC/AT, EOMI,  Cardio: S1, S2 heard, RRR, no murmurs  Resp: decreased  Abd: soft, NT, ND,  Ext: no edema or cyanosis  Skin: warm, dry, no visible rash      Laboratory Studies:  CBC:                       10.5   6.33  )-----------( 277      ( 30 Jan 2024 09:45 )             29.9     CMP: 01-30    137  |  105  |  25<H>  ----------------------------<  214<H>  3.9   |  23  |  1.20    Ca    9.2      30 Jan 2024 09:45    TPro  6.8  /  Alb  2.9<L>  /  TBili  0.5  /  DBili  x   /  AST  28  /  ALT  37  /  AlkPhos  73  01-30    LIVER FUNCTIONS - ( 30 Jan 2024 09:45 )  Alb: 2.9 g/dL / Pro: 6.8 g/dL / ALK PHOS: 73 U/L / ALT: 37 U/L / AST: 28 U/L / GGT: x           Urinalysis Basic - ( 30 Jan 2024 09:45 )    Color: x / Appearance: x / SG: x / pH: x  Gluc: 214 mg/dL / Ketone: x  / Bili: x / Urobili: x   Blood: x / Protein: x / Nitrite: x   Leuk Esterase: x / RBC: x / WBC x   Sq Epi: x / Non Sq Epi: x / Bacteria: x        Microbiology: reviewed    Culture - Urine (collected 01-28-24 @ 20:15)  Source: Clean Catch Clean Catch (Midstream)  Preliminary Report (01-29-24 @ 21:30):    >100,000 CFU/ml Gram Negative Rods    Culture - Blood (collected 01-28-24 @ 18:50)  Source: .Blood Blood-Peripheral  Gram Stain (01-29-24 @ 12:10):    Growth in anaerobic bottle: Gram Negative Rods  Preliminary Report (01-30-24 @ 12:03):    Growth in anaerobic bottle: Enterobacter cloacae complex    See previous culture [id number]    Culture - Blood (collected 01-28-24 @ 18:40)  Source: .Blood Blood-Peripheral  Gram Stain (01-29-24 @ 11:57):    Growth in aerobic bottle: Gram Negative Rods    Growth in anaerobic bottle: Gram Negative Rods  Preliminary Report (01-30-24 @ 11:59):    Growth in aerobic and anaerobic bottles: Enterobacter cloacae complex    Direct identification is available within approximately 3-5    hours either by Blood Panel Multiplexed PCR or Direct    MALDI-TOF. Details: [email or web address]  Organism: Blood Culture PCR (01-29-24 @ 11:38)  Organism: Blood Culture PCR (01-29-24 @ 11:38)      Method Type: PCR      -  Enterobacter (non-cloacae complex): Detec          Radiology: reviewed      
Patient is a 83y old  Male who presents with a chief complaint of UTI, MARCELO (01 Feb 2024 13:20)    Type:2 DX= POCT >200's- discharge home on Lantus 10 units SQ daily. Pen teaching for patient/dtr at bedside.    diabetes education provided verbally and handouts.     HPI:  83yM pmhx HTN, T2DM, CVA brought to ED by family for elevated blood glucose levels at home. Pt with recent admission at [facility] from 1/22/24 to 1/26/24 for management of MARCELO, dehydration, and urinary retention (florez removed prior to discharge). Pt was started on Januvia 50mg daily in addition to his metformin 750mg twice a day, which he has been adhering to. However, today pt noted elevated blood glucose levels in 500s with associated weakness and intermittent chills noted by family. Repeat blood glucose was still elevated in 500s after eating and drinking. Pt then took glipizide 10mg, which he was previously on. Pt also endorsed pain on urination and incomplete emptying when urinating for one day. Of note pt had florez placed on last admission that was removed prior to discharge. Denies current dysuria, abdominal pain, SOB, chest pain, subjective fevers, or chills at present.   ED course  Vitals: T 98.8F, , /57, RR 17, SpO2 99% on RA  Significant labs: H/H 10.1/28.8, Na 126, BUN 39, Cr 2.00, lactate 2.4, UA with cloudy urine, 100 protein, large leukocyte esterase, WBC too numerous to count, many bacteria, glucose >1000   Imaging:   - CT Head: No large territory acute infarct, intracranial hemorrhage, or mass effect. Stable chronic lacunar infarcts with bilateral microangiopathic white matter changes  - CT A/P: findings consistent with cystitis; no hydronephrosis or obstructing calculus  - CXR negative on wet read, f/u official read  EKG: sinus rhythm with 1st degree AV block, HR 86, QTc 473  In ED patient given: LR bolus x1, Rocephin 1000mg x1   (29 Jan 2024 00:49)      PAST MEDICAL & SURGICAL HISTORY:  Diabetes      Hyperlipidemia      Kidney stone      CVA (cerebral vascular accident)      H/O lithotripsy      S/P inguinal hernia repair      H/O hand surgery          Allergies    No Known Allergies    Intolerances        MEDICATIONS  (STANDING):  amLODIPine   Tablet 5 milliGRAM(s) Oral daily  atorvastatin 20 milliGRAM(s) Oral at bedtime  cefTRIAXone   IVPB 2000 milliGRAM(s) IV Intermittent every 24 hours  dextrose 5%. 1000 milliLiter(s) (50 mL/Hr) IV Continuous <Continuous>  dextrose 5%. 1000 milliLiter(s) (100 mL/Hr) IV Continuous <Continuous>  dextrose 50% Injectable 25 Gram(s) IV Push once  dextrose 50% Injectable 12.5 Gram(s) IV Push once  dextrose 50% Injectable 25 Gram(s) IV Push once  glucagon  Injectable 1 milliGRAM(s) IntraMuscular once  heparin   Injectable 5000 Unit(s) SubCutaneous every 8 hours  influenza  Vaccine (HIGH DOSE) 0.7 milliLiter(s) IntraMuscular once  insulin glargine Injectable (LANTUS) 10 Unit(s) SubCutaneous every morning  insulin lispro (ADMELOG) corrective regimen sliding scale   SubCutaneous three times a day before meals  insulin lispro (ADMELOG) corrective regimen sliding scale   SubCutaneous at bedtime  insulin lispro Injectable (ADMELOG) 3 Unit(s) SubCutaneous three times a day before meals  phenazopyridine 100 milliGRAM(s) Oral every 8 hours  sodium chloride 0.9%. 1000 milliLiter(s) (50 mL/Hr) IV Continuous <Continuous>  tamsulosin 0.4 milliGRAM(s) Oral at bedtime      
INTERVAL HPI/OVERNIGHT EVENTS:  Patient seen and examined at bedside. States he is feeling well, denies any chest pain, SOB, abd pain. Still with mild burning on urination, but markedly improved. Met with daughter and spouse at bedside. Daughter concerned patient has not been out of bed since admission, would like PT to evaluate patient. Daughter would also like to speak with Diabetes NP as patient's FS were high prior to this admission.    ROS: All other review of systems is negative unless indicated above.    MEDICATIONS  (STANDING):  amLODIPine   Tablet 5 milliGRAM(s) Oral daily  atorvastatin 20 milliGRAM(s) Oral at bedtime  cefTRIAXone   IVPB 2000 milliGRAM(s) IV Intermittent every 24 hours  dextrose 5%. 1000 milliLiter(s) (50 mL/Hr) IV Continuous <Continuous>  dextrose 5%. 1000 milliLiter(s) (100 mL/Hr) IV Continuous <Continuous>  dextrose 50% Injectable 25 Gram(s) IV Push once  dextrose 50% Injectable 12.5 Gram(s) IV Push once  dextrose 50% Injectable 25 Gram(s) IV Push once  glucagon  Injectable 1 milliGRAM(s) IntraMuscular once  heparin   Injectable 5000 Unit(s) SubCutaneous every 8 hours  influenza  Vaccine (HIGH DOSE) 0.7 milliLiter(s) IntraMuscular once  insulin glargine Injectable (LANTUS) 10 Unit(s) SubCutaneous every morning  insulin lispro (ADMELOG) corrective regimen sliding scale   SubCutaneous three times a day before meals  insulin lispro (ADMELOG) corrective regimen sliding scale   SubCutaneous at bedtime  insulin lispro Injectable (ADMELOG) 3 Unit(s) SubCutaneous three times a day before meals  phenazopyridine 100 milliGRAM(s) Oral every 8 hours  sodium chloride 0.9%. 1000 milliLiter(s) (50 mL/Hr) IV Continuous <Continuous>  tamsulosin 0.4 milliGRAM(s) Oral at bedtime    MEDICATIONS  (PRN):  dextrose Oral Gel 15 Gram(s) Oral once PRN Blood Glucose LESS THAN 70 milliGRAM(s)/deciliter      Allergies    No Known Allergies    Intolerances        Vital Signs Last 24 Hrs  T(C): 36.7 (31 Jan 2024 20:18), Max: 37.2 (31 Jan 2024 12:07)  T(F): 98.1 (31 Jan 2024 20:18), Max: 99 (31 Jan 2024 12:07)  HR: 85 (31 Jan 2024 20:18) (74 - 85)  BP: 108/61 (31 Jan 2024 20:18) (108/61 - 154/70)  BP(mean): --  RR: 18 (31 Jan 2024 20:18) (18 - 18)  SpO2: 98% (31 Jan 2024 20:18) (96% - 98%)    Parameters below as of 31 Jan 2024 20:18  Patient On (Oxygen Delivery Method): room air        01-31 @ 07:01  -  01-31 @ 23:19  --------------------------------------------------------  IN: 0 mL / OUT: 100 mL / NET: -100 mL        Physical Exam:  General: laying comfortably in bed, NAD  Neurology: A&Ox3, nonfocal, CERVANTES x 4  Respiratory: CTA B/L  CV: RRR, S1S2, no murmurs, rubs or gallops  Abdominal: Soft, NT, ND +BS  Extremities: No edema, + peripheral pulses      LABS:                        11.6   5.95  )-----------( 282      ( 31 Jan 2024 07:40 )             32.8     01-31    136  |  104  |  24<H>  ----------------------------<  213<H>  4.1   |  22  |  1.10    Ca    9.2      31 Jan 2024 07:40    TPro  6.8  /  Alb  2.9<L>  /  TBili  0.5  /  DBili  x   /  AST  28  /  ALT  37  /  AlkPhos  73  01-30      Urinalysis Basic - ( 31 Jan 2024 07:40 )    Color: x / Appearance: x / SG: x / pH: x  Gluc: 213 mg/dL / Ketone: x  / Bili: x / Urobili: x   Blood: x / Protein: x / Nitrite: x   Leuk Esterase: x / RBC: x / WBC x   Sq Epi: x / Non Sq Epi: x / Bacteria: x        RADIOLOGY & ADDITIONAL TESTS:
Patient is a 83y old  Male who presents with a chief complaint of UTI, MARCELO (29 Jan 2024 10:14)      INTERVAL HPI/OVERNIGHT EVENTS: Patient seen and examined at bedside. No overnight events. Tolerating diet. Denies fever, chills, chest pain, shortness of breath, abdominal pain, nausea/vomiting, headache.    MEDICATIONS  (STANDING):  amLODIPine   Tablet 5 milliGRAM(s) Oral daily  atorvastatin 20 milliGRAM(s) Oral at bedtime  cefTRIAXone   IVPB 1000 milliGRAM(s) IV Intermittent every 24 hours  dextrose 5%. 1000 milliLiter(s) (50 mL/Hr) IV Continuous <Continuous>  dextrose 5%. 1000 milliLiter(s) (100 mL/Hr) IV Continuous <Continuous>  dextrose 50% Injectable 25 Gram(s) IV Push once  dextrose 50% Injectable 12.5 Gram(s) IV Push once  dextrose 50% Injectable 25 Gram(s) IV Push once  glucagon  Injectable 1 milliGRAM(s) IntraMuscular once  heparin   Injectable 5000 Unit(s) SubCutaneous every 8 hours  insulin lispro (ADMELOG) corrective regimen sliding scale   SubCutaneous at bedtime  insulin lispro (ADMELOG) corrective regimen sliding scale   SubCutaneous three times a day before meals  sodium chloride 0.9%. 1000 milliLiter(s) (75 mL/Hr) IV Continuous <Continuous>  tamsulosin 0.4 milliGRAM(s) Oral at bedtime    MEDICATIONS  (PRN):  dextrose Oral Gel 15 Gram(s) Oral once PRN Blood Glucose LESS THAN 70 milliGRAM(s)/deciliter      Allergies    No Known Allergies    Intolerances        REVIEW OF SYSTEMS:  CONSTITUTIONAL: No fever or chills  HEENT:  No headache, no sore throat  RESPIRATORY: No cough, wheezing, or shortness of breath  CARDIOVASCULAR: No chest pain, palpitations  GASTROINTESTINAL: No abd pain, nausea, vomiting, or diarrhea  GENITOURINARY: No dysuria, frequency, or hematuria  NEUROLOGICAL: no focal weakness or dizziness  MUSCULOSKELETAL: no myalgias     Vital Signs Last 24 Hrs  T(C): 36.9 (29 Jan 2024 12:21), Max: 37.1 (28 Jan 2024 16:54)  T(F): 98.4 (29 Jan 2024 12:21), Max: 98.8 (28 Jan 2024 16:54)  HR: 86 (29 Jan 2024 12:21) (81 - 105)  BP: 126/67 (29 Jan 2024 12:21) (115/63 - 153/66)  BP(mean): --  RR: 18 (29 Jan 2024 12:21) (17 - 18)  SpO2: 95% (29 Jan 2024 12:21) (95% - 99%)    Parameters below as of 29 Jan 2024 07:55  Patient On (Oxygen Delivery Method): room air        PHYSICAL EXAM:  GENERAL: NAD  HEENT:  anicteric, moist mucous membranes  CHEST/LUNG:  CTA b/l, no rales, wheezes, or rhonchi  HEART:  RRR, S1, S2  ABDOMEN:  BS+, soft, nontender, nondistended  EXTREMITIES: no edema, cyanosis, or calf tenderness  NERVOUS SYSTEM: answers questions and follows commands appropriately    LABS:                        9.9    7.69  )-----------( 225      ( 29 Jan 2024 04:15 )             27.7     CBC Full  -  ( 29 Jan 2024 04:15 )  WBC Count : 7.69 K/uL  Hemoglobin : 9.9 g/dL  Hematocrit : 27.7 %  Platelet Count - Automated : 225 K/uL  Mean Cell Volume : 89.9 fl  Mean Cell Hemoglobin : 32.1 pg  Mean Cell Hemoglobin Concentration : 35.7 gm/dL  Auto Neutrophil # : 6.01 K/uL  Auto Lymphocyte # : 0.62 K/uL  Auto Monocyte # : 0.97 K/uL  Auto Eosinophil # : 0.03 K/uL  Auto Basophil # : 0.01 K/uL  Auto Neutrophil % : 78.1 %  Auto Lymphocyte % : 8.1 %  Auto Monocyte % : 12.6 %  Auto Eosinophil % : 0.4 %  Auto Basophil % : 0.1 %    29 Jan 2024 04:15    135    |  102    |  33     ----------------------------<  152    3.6     |  25     |  1.40     Ca    8.9        29 Jan 2024 04:15    TPro  6.5    /  Alb  3.1    /  TBili  0.9    /  DBili  x      /  AST  23     /  ALT  35     /  AlkPhos  66     29 Jan 2024 04:15    PT/INR - ( 28 Jan 2024 18:40 )   PT: 12.9 sec;   INR: 1.11 ratio         PTT - ( 28 Jan 2024 18:40 )  PTT:26.6 sec  Urinalysis Basic - ( 29 Jan 2024 04:15 )    Color: x / Appearance: x / SG: x / pH: x  Gluc: 152 mg/dL / Ketone: x  / Bili: x / Urobili: x   Blood: x / Protein: x / Nitrite: x   Leuk Esterase: x / RBC: x / WBC x   Sq Epi: x / Non Sq Epi: x / Bacteria: x      CAPILLARY BLOOD GLUCOSE      POCT Blood Glucose.: 200 mg/dL (29 Jan 2024 11:13)  POCT Blood Glucose.: 137 mg/dL (29 Jan 2024 07:27)  POCT Blood Glucose.: 442 mg/dL (28 Jan 2024 17:00)        Culture - Blood (collected 01-28-24 @ 18:50)  Source: .Blood Blood-Peripheral  Gram Stain (01-29-24 @ 12:10):    Growth in anaerobic bottle: Gram Negative Rods  Preliminary Report (01-29-24 @ 12:10):    Growth in anaerobic bottle: Gram Negative Rods    Culture - Blood (collected 01-28-24 @ 18:40)  Source: .Blood Blood-Peripheral  Gram Stain (01-29-24 @ 11:57):    Growth in aerobic bottle: Gram Negative Rods    Growth in anaerobic bottle: Gram Negative Rods  Preliminary Report (01-29-24 @ 11:57):    Growth in aerobic bottle: Gram Negative Rods    Growth in anaerobic bottle: Gram Negative Rods    Direct identification is available within approximately 3-5    hours either by Blood Panel Multiplexed PCR or Direct    MALDI-TOF. Details: [email or web address]  Organism: Blood Culture PCR (01-29-24 @ 11:38)  Organism: Blood Culture PCR (01-29-24 @ 11:38)      Method Type: PCR      -  Enterobacter (non-cloacae complex): Detec        RADIOLOGY & ADDITIONAL TESTS:    Personally reviewed.     Consultant(s) Notes Reviewed:  [x] YES  [ ] NO    
INTERVAL HPI/OVERNIGHT EVENTS:  Patient seen and examined at bedside. States he is feeling well. Complains of burning at tip of the penis during urination, but states it is markedly improved since admission. Denies any chest pain, SOB, abd pain.    ROS: All other review of systems is negative unless indicated above.    MEDICATIONS  (STANDING):  amLODIPine   Tablet 5 milliGRAM(s) Oral daily  atorvastatin 20 milliGRAM(s) Oral at bedtime  cefTRIAXone   IVPB 2000 milliGRAM(s) IV Intermittent every 24 hours  dextrose 5%. 1000 milliLiter(s) (50 mL/Hr) IV Continuous <Continuous>  dextrose 5%. 1000 milliLiter(s) (100 mL/Hr) IV Continuous <Continuous>  dextrose 50% Injectable 25 Gram(s) IV Push once  dextrose 50% Injectable 12.5 Gram(s) IV Push once  dextrose 50% Injectable 25 Gram(s) IV Push once  glucagon  Injectable 1 milliGRAM(s) IntraMuscular once  heparin   Injectable 5000 Unit(s) SubCutaneous every 8 hours  influenza  Vaccine (HIGH DOSE) 0.7 milliLiter(s) IntraMuscular once  insulin lispro (ADMELOG) corrective regimen sliding scale   SubCutaneous at bedtime  insulin lispro (ADMELOG) corrective regimen sliding scale   SubCutaneous three times a day before meals  insulin lispro Injectable (ADMELOG) 3 Unit(s) SubCutaneous three times a day before meals  sodium chloride 0.9%. 1000 milliLiter(s) (50 mL/Hr) IV Continuous <Continuous>  tamsulosin 0.4 milliGRAM(s) Oral at bedtime    MEDICATIONS  (PRN):  dextrose Oral Gel 15 Gram(s) Oral once PRN Blood Glucose LESS THAN 70 milliGRAM(s)/deciliter      Allergies    No Known Allergies    Intolerances          Vital Signs Last 24 Hrs  T(C): 36.8 (30 Jan 2024 20:59), Max: 36.8 (30 Jan 2024 20:59)  T(F): 98.3 (30 Jan 2024 20:59), Max: 98.3 (30 Jan 2024 20:59)  HR: 81 (30 Jan 2024 20:59) (81 - 89)  BP: 136/71 (30 Jan 2024 20:59) (117/65 - 144/71)  BP(mean): --  RR: 18 (30 Jan 2024 20:59) (18 - 18)  SpO2: 95% (30 Jan 2024 20:59) (95% - 96%)    Parameters below as of 30 Jan 2024 14:15  Patient On (Oxygen Delivery Method): room air        Physical Exam:  General: laying comfortably in bed, NAD  Neurology: A&Ox3, nonfocal, CERVANTES x 4  Respiratory: CTA B/L  CV: RRR, S1S2, no murmurs, rubs or gallops  Abdominal: Soft, NT, ND +BS  Extremities: No edema, + peripheral pulses      LABS:                        10.5   6.33  )-----------( 277      ( 30 Jan 2024 09:45 )             29.9     01-30    137  |  105  |  25<H>  ----------------------------<  214<H>  3.9   |  23  |  1.20    Ca    9.2      30 Jan 2024 09:45    TPro  6.8  /  Alb  2.9<L>  /  TBili  0.5  /  DBili  x   /  AST  28  /  ALT  37  /  AlkPhos  73  01-30      Urinalysis Basic - ( 30 Jan 2024 09:45 )    Color: x / Appearance: x / SG: x / pH: x  Gluc: 214 mg/dL / Ketone: x  / Bili: x / Urobili: x   Blood: x / Protein: x / Nitrite: x   Leuk Esterase: x / RBC: x / WBC x   Sq Epi: x / Non Sq Epi: x / Bacteria: x        RADIOLOGY & ADDITIONAL TESTS:

## 2024-02-01 NOTE — CASE MANAGEMENT PROGRESS NOTE - NSCMPROGRESSNOTE_GEN_ALL_CORE
CM met with pt. Pt daughter Briana Howe at bedside. Pt gave verbal consent to speak with family present. Pt daughter had questions regarding [facility]HA, CM discussed private hire and provided list of Health system agencies.  Pt is to be transitioned to home with no formal services. Pt received PT script for physical therapy from MD. IMM given. Pt aware of plan and in agreement. Confirmed pharmacy is [facility]. community MD is Dr Rucker. Pt daughter stated she would make pts follow up appointments. Pt has DME including diabetic supplies, walker and cane. Diabetice educator in to see pt and family at present. Pt daughter stated she will drive pt home. CM discussed plan with MD and RN. CM to remain available

## 2024-02-01 NOTE — DISCHARGE NOTE NURSING/CASE MANAGEMENT/SOCIAL WORK - NSDCPEFALRISK_GEN_ALL_CORE
For information on Fall & Injury Prevention, visit: [email or web address] OR  [email or web address] OR  https://www.cdc.gov/steadi/patient.html

## 2024-02-01 NOTE — DISCHARGE NOTE PROVIDER - HOSPITAL COURSE
ADMISSION DATE:  01-29-24    ---  FROM ADMISSION H+P:   HPI:  83yM pmhx HTN, T2DM, CVA brought to ED by family for elevated blood glucose levels at home. Pt with recent admission at [facility] from 1/22/24 to 1/26/24 for management of MARCELO, dehydration, and urinary retention (florez removed prior to discharge). Pt was started on Januvia 50mg daily in addition to his metformin 750mg twice a day, which he has been adhering to. However, today pt noted elevated blood glucose levels in 500s with associated weakness and intermittent chills noted by family. Repeat blood glucose was still elevated in 500s after eating and drinking. Pt then took glipizide 10mg, which he was previously on. Pt also endorsed pain on urination and incomplete emptying when urinating for one day. Of note pt had florez placed on last admission that was removed prior to discharge. Denies current dysuria, abdominal pain, SOB, chest pain, subjective fevers, or chills at present.   ED course  Vitals: T 98.8F, , /57, RR 17, SpO2 99% on RA  Significant labs: H/H 10.1/28.8, Na 126, BUN 39, Cr 2.00, lactate 2.4, UA with cloudy urine, 100 protein, large leukocyte esterase, WBC too numerous to count, many bacteria, glucose >1000   Imaging:   - CT Head: No large territory acute infarct, intracranial hemorrhage, or mass effect. Stable chronic lacunar infarcts with bilateral microangiopathic white matter changes  - CT A/P: findings consistent with cystitis; no hydronephrosis or obstructing calculus  - CXR negative on wet read, f/u official read  EKG: sinus rhythm with 1st degree AV block, HR 86, QTc 473  In ED patient given: LR bolus x1, Rocephin 1000mg x1   (29 Jan 2024 00:49)      ---  HOSPITAL COURSE/PERTINENT LABS/PROCEDURES PERFORMED/PENDING TESTS:  Patient admitted to medicine for UTI. Blood cultures and urine culture positive for enterobacter. Patient on IV abx, evaluated by ID, repeat blood cultures negative, plan to switch to PO abx on discharge to complete 10 day course. PT evaluated patient, patient ambulated well. Patient seen by Diabetes NP as concern for elevated sugars at home, recommend starting lantus at home, education provided. Patient stable for discharge home.    ---  PATIENT CONDITION:  - stable    ---  PHYSICAL EXAM ON DAY OF DISCHARGE:  General: laying comfortably in bed, NAD  Neurology: A&Ox3, nonfocal, CERVANTES x 4  Respiratory: CTA B/L  CV: RRR, S1S2, no murmurs, rubs or gallops  Abdominal: Soft, NT, ND +BS  Extremities: No edema, + peripheral pulses      ---  CONSULTANTS:   Dr. Clara Bradley (ID)  Pat Weil (Diabetic NP)    ---  TIME SPENT:  I, the attending physician, was physically present for the key portions of the evaluation and management (E/M) service provided. The total amount of time spent reviewing the hospital notes, laboratory values, imaging findings, assessing/counseling the patient, discussing with consultant physicians, social work, nursing staff was 38 minutes

## 2024-02-01 NOTE — DISCHARGE NOTE PROVIDER - NSDCCPCAREPLAN_GEN_ALL_CORE_FT
PRINCIPAL DISCHARGE DIAGNOSIS  Diagnosis: Acute UTI  Assessment and Plan of Treatment: Complete course of antibiotics as prescribed (last day 2/7/24). Please follow up with PCP within 2 weeks of discharge.      SECONDARY DISCHARGE DIAGNOSES  Diagnosis: MARCELO (acute kidney injury)  Assessment and Plan of Treatment: Resolved, likely in setting of infection. Follow up with PCP within 2 weeks of discharge.    Diagnosis: Diabetes mellitus with hyperglycemia  Assessment and Plan of Treatment: Start lantus 10 units in AM. Continue metformin and januvia as prescribed. Check your sugar levels four times a day. If consistently getting levles over 400, please contact your PCP. Follow up with your PCP within 2 weeks of discharge.

## 2024-02-01 NOTE — DISCHARGE NOTE NURSING/CASE MANAGEMENT/SOCIAL WORK - PATIENT PORTAL LINK FT
You can access the FollowMyHealth Patient Portal offered by [facility] by registering at the following website: [email or web address]. By joining [facility]’s FollowMyHealth portal, you will also be able to view your health information using other applications (apps) compatible with our system.

## 2024-02-01 NOTE — PROGRESS NOTE ADULT - PROBLEM SELECTOR PLAN 1
Pt with intermittent chills, dysuria, and sensation of incomplete bladder emptying on admission and recent indwelling florez, removed prior to this admission; of note pt with hx of kidney stones and urinary retention  - UA on admission with cloudy urine, 100 protein, large leukocyte esterase, WBC too numerous to count, many bacteria, glucose >1000   - CT A/P: cystitis with no hydronephrosis or obstructing calculus  - CXR negative on wet read, f/u official read  - Lactate 2.4 on admission, wnl on repeat   - In ED: LR bolus x1, Rocephin 1000mg x1  - Bladder scan negative on admission  - continue with Rocephin- dose increased to 2g daily per ID recs  - monitor Is and Os  -Urine cx with >100k enetrobacter- discussed with ID, recommend switch to levaquin 750mg daily until 2/7
Pt with intermittent chills, dysuria, and sensation of incomplete bladder emptying on admission and recent indwelling florez, removed prior to this admission; of note pt with hx of kidney stones and urinary retention  - UA on admission with cloudy urine, 100 protein, large leukocyte esterase, WBC too numerous to count, many bacteria, glucose >1000   - CT A/P: cystitis with no hydronephrosis or obstructing calculus  - CXR negative on wet read, f/u official read  - Lactate 2.4 on admission, wnl on repeat   - In ED: LR bolus x1, Rocephin 1000mg x1  - Bladder scan negative on admission  - continue with Rocephin  - monitor Is and Os  - BCx x2 sent, f/u results  - UCx sent, f/u results  - F/u repeat lactate
Type 2 A1c % adm  Recommend endocrine-Perlman on consult  DSC home on lantus 10 units daily- pen teaching with patient/dtr  FU appt: TBA  DSC recommendations: return to home regimen and glucose monitoring  diabetes education provided  Diabetes support info and cell # [phone] given   Goal 100-180 mg/dL; 140-180 mg/dL in critical care areas
Pt with intermittent chills, dysuria, and sensation of incomplete bladder emptying on admission and recent indwelling florez, removed prior to this admission; of note pt with hx of kidney stones and urinary retention  - UA on admission with cloudy urine, 100 protein, large leukocyte esterase, WBC too numerous to count, many bacteria, glucose >1000   - CT A/P: cystitis with no hydronephrosis or obstructing calculus  - CXR negative on wet read, f/u official read  - Lactate 2.4 on admission, wnl on repeat   - In ED: LR bolus x1, Rocephin 1000mg x1  - Bladder scan negative on admission  - continue with Rocephin- dose increased to 2g daily per ID recs  - monitor Is and Os

## 2024-02-01 NOTE — DISCHARGE NOTE PROVIDER - ATTENDING DISCHARGE PHYSICAL EXAMINATION:
Patient seen and examined, states he is feeling well, states dysuria has resolved and has been more ambulatory since last night. Eager to go home.    General: laying comfortably in bed, NAD  Neurology: A&Ox3, nonfocal, CERVANTES x 4  Respiratory: CTA B/L  CV: RRR, S1S2, no murmurs, rubs or gallops  Abdominal: Soft, NT, ND +BS  Extremities: No edema, + peripheral pulses

## 2024-02-01 NOTE — PROGRESS NOTE ADULT - ASSESSMENT
83yM pmhx HTN, T2DM, CVA brought to ED by family for elevated blood glucose levels at home with dysuria, intermittent chills. Admitted for UTI and MARCELO.     Acute Pyelonephritis w/ GNR Bacteremia  MARCELO  -pt with intermittent chills, dysuria, and sensation of incomplete bladder emptying on admission and recent indwelling florez, removed prior to this admission; of note pt with hx of kidney stones and urinary retention  - UA on admission with cloudy urine, 100 protein, large leukocyte esterase, WBC too numerous to count, many bacteria, glucose >1000   - CT A/P: cystitis with no hydronephrosis or obstructing calculus  - CXR negative on wet read, f/u official read  - BCx and UCx enterobacter, susceptibilities reviewed. Repeat BCx NGTD  Recommendations:   C/w ceftriaxone  Switch to PO levofloxacin 750mg daily to complete x10 day course until 2/7/24  Trend temps/WBC    Stable from ID standpoint  D/c planning per primary team    Infectious Diseases will continue to follow. Please call with any questions.   Clara Bradley M.D.  [facility] Division of Infectious Diseases [phone]  For after 5 P.M. and weekends, please call [phone]      
83yM pmhx HTN, T2DM, CVA brought to ED by family for elevated blood glucose levels at home with dysuria, intermittent chills. Admitted for UTI and MARCELO.     Acute Pyelonephritis w/ GNR Bacteremia  MARCELO  -pt with intermittent chills, dysuria, and sensation of incomplete bladder emptying on admission and recent indwelling florez, removed prior to this admission; of note pt with hx of kidney stones and urinary retention  - UA on admission with cloudy urine, 100 protein, large leukocyte esterase, WBC too numerous to count, many bacteria, glucose >1000   - CT A/P: cystitis with no hydronephrosis or obstructing calculus  - CXR negative on wet read, f/u official read  Recommendations:   C/w ceftriaxone 2gm dose  F/u pending BCx susceptibilities   F/u repeat BCx  F/u UCx  Trend temps/WBC  Further recs to follow    D/w son at bedside  Infectious Diseases will continue to follow. Please call with any questions.   Clara Bradley M.D.  [facility] Division of Infectious Diseases [phone]  For after 5 P.M. and weekends, please call [phone]      
83yM pmhx HTN, T2DM, CVA brought to ED by family for elevated blood glucose levels at home with dysuria, intermittent chills. Admitted for UTI and MARCELO.     Acute Pyelonephritis w/ GNR Bacteremia  MARCELO  -pt with intermittent chills, dysuria, and sensation of incomplete bladder emptying on admission and recent indwelling florez, removed prior to this admission; of note pt with hx of kidney stones and urinary retention  - UA on admission with cloudy urine, 100 protein, large leukocyte esterase, WBC too numerous to count, many bacteria, glucose >1000   - CT A/P: cystitis with no hydronephrosis or obstructing calculus  - CXR negative on wet read, f/u official read  - BCx and UCx enterobacter, susceptibilities reviewed. Repeat BCx NGTD  Recommendations:   C/w ceftriaxone  Switch to PO levofloxacin 750mg daily to complete x10 day course until 2/7/24  Trend temps/WBC    Stable from ID standpoint  D/c planning per primary team    D/w Dr. Martinez  Infectious Diseases will continue to follow. Please call with any questions.   Clara Bradley M.D.  [facility] Division of Infectious Diseases [phone]  For after 5 P.M. and weekends, please call [phone]      
83yM pmhx HTN, T2DM, CVA brought to ED by family for elevated blood glucose levels at home with dysuria, intermittent chills. Admitted for UTI and MARCELO.   
83yM pmhx HTN, T2DM, CVA brought to ED by family for elevated blood glucose levels at home with dysuria, intermittent chills. Admitted for UTI and MARCELO.   
Physical Exam:   Vital Signs Last 24 Hrs  T(C): 36.6 (01 Feb 2024 12:31), Max: 36.9 (01 Feb 2024 04:29)  T(F): 97.8 (01 Feb 2024 12:31), Max: 98.4 (01 Feb 2024 04:29)  HR: 90 (01 Feb 2024 12:31) (77 - 90)  BP: 115/69 (01 Feb 2024 12:31) (108/61 - 123/54)  BP(mean): --  RR: 18 (01 Feb 2024 12:31) (18 - 18)  SpO2: 97% (01 Feb 2024 12:31) (93% - 98%)    Parameters below as of 01 Feb 2024 12:31  Patient On (Oxygen Delivery Method): room air             CAPILLARY BLOOD GLUCOSE      POCT Blood Glucose.: 242 mg/dL (01 Feb 2024 12:25)  POCT Blood Glucose.: 204 mg/dL (01 Feb 2024 08:07)  POCT Blood Glucose.: 306 mg/dL (31 Jan 2024 21:15)  POCT Blood Glucose.: 288 mg/dL (31 Jan 2024 17:19)      Cholesterol, Serum: 113 mg/dL (05.19.21 @ 08:36)     HDL Cholesterol, Serum: 22 mg/dL (05.19.21 @ 08:36)     LDL Cholesterol Calculated: 66 mg/dL (05.19.21 @ 08:36)     DIET: CC  >50%        
83yM pmhx HTN, T2DM, CVA brought to ED by family for elevated blood glucose levels at home with dysuria, intermittent chills. Admitted for UTI and MARCELO.

## 2024-02-01 NOTE — PROGRESS NOTE ADULT - PROVIDER SPECIALTY LIST ADULT
Diabetes
Infectious Disease
Hospitalist

## 2024-02-04 LAB
CULTURE RESULTS: SIGNIFICANT CHANGE UP
CULTURE RESULTS: SIGNIFICANT CHANGE UP
SPECIMEN SOURCE: SIGNIFICANT CHANGE UP
SPECIMEN SOURCE: SIGNIFICANT CHANGE UP
